# Patient Record
Sex: FEMALE | Race: WHITE | Employment: OTHER | URBAN - METROPOLITAN AREA
[De-identification: names, ages, dates, MRNs, and addresses within clinical notes are randomized per-mention and may not be internally consistent; named-entity substitution may affect disease eponyms.]

---

## 2017-02-10 ENCOUNTER — OFFICE VISIT (OUTPATIENT)
Dept: FAMILY MEDICINE CLINIC | Facility: CLINIC | Age: 67
End: 2017-02-10

## 2017-02-10 VITALS
SYSTOLIC BLOOD PRESSURE: 110 MMHG | BODY MASS INDEX: 23.37 KG/M2 | DIASTOLIC BLOOD PRESSURE: 68 MMHG | RESPIRATION RATE: 12 BRPM | WEIGHT: 142 LBS | HEIGHT: 65.5 IN | HEART RATE: 84 BPM | TEMPERATURE: 99 F

## 2017-02-10 DIAGNOSIS — R51.9 HEADACHE, UNSPECIFIED HEADACHE TYPE: Primary | ICD-10-CM

## 2017-02-10 DIAGNOSIS — R10.84 GENERALIZED ABDOMINAL PAIN: ICD-10-CM

## 2017-02-10 PROBLEM — E78.49 FAMILIAL MULTIPLE LIPOPROTEIN-TYPE HYPERLIPIDEMIA: Status: ACTIVE | Noted: 2017-02-10

## 2017-02-10 PROBLEM — G43.909 MIGRAINE: Status: ACTIVE | Noted: 2017-02-10

## 2017-02-10 PROCEDURE — 99214 OFFICE O/P EST MOD 30 MIN: CPT | Performed by: FAMILY MEDICINE

## 2017-02-10 RX ORDER — RIZATRIPTAN BENZOATE 10 MG/1
10 TABLET ORAL
COMMUNITY
Start: 2007-01-09 | End: 2017-02-11

## 2017-02-10 RX ORDER — CITALOPRAM 20 MG/1
20 TABLET ORAL DAILY
COMMUNITY
Start: 2014-02-27 | End: 2017-05-10

## 2017-02-10 RX ORDER — ZOLPIDEM TARTRATE 5 MG/1
5 TABLET ORAL DAILY
COMMUNITY
Start: 2016-09-24 | End: 2017-05-10

## 2017-02-10 RX ORDER — MULTIVIT-MIN/IRON/FOLIC ACID/K 18-600-40
500 CAPSULE ORAL DAILY
COMMUNITY
Start: 2011-12-29

## 2017-02-10 RX ORDER — UBIDECARENONE 75 MG
100 CAPSULE ORAL DAILY
COMMUNITY
Start: 2016-04-28 | End: 2019-11-27

## 2017-02-10 RX ORDER — SOLIFENACIN SUCCINATE 5 MG/1
5 TABLET, FILM COATED ORAL DAILY
COMMUNITY
Start: 2015-04-08 | End: 2017-07-01

## 2017-02-10 NOTE — PROGRESS NOTES
[facility] [place]  PROGRESS NOTE  Chief Complaint:   Patient presents with:  Headache  Abdominal Pain      HPI:   This is a 73year old female coming in for continued left-sided headache for the past month.   Patient has had a history of migrain 5 mg by mouth daily. Disp:  Rfl:    Rizatriptan Benzoate 10 MG Oral Tab Take 10 mg by mouth.  Take 1 tab tid for migraine prn Disp:  Rfl:       Counseling given: Not Answered       REVIEW OF SYSTEMS:   CONSTITUTIONAL:  Denies unusual weight gain/loss  EENT: auscultation bilterally, no rales/rhonchi/wheezing. CHEST: No tenderness. ABDOMEN: Soft with mild upper abdominal tenderness. Bowel sounds are normal.  No masses. =      ASSESSMENT AND PLAN:   Kim Lucas was seen today for headache and abdominal pain.

## 2017-02-13 RX ORDER — RIZATRIPTAN BENZOATE 10 MG/1
TABLET ORAL
Qty: 12 TABLET | Refills: 12 | Status: SHIPPED | OUTPATIENT
Start: 2017-02-13 | End: 2017-03-15 | Stop reason: ALTCHOICE

## 2017-02-22 ENCOUNTER — OFFICE VISIT (OUTPATIENT)
Dept: FAMILY MEDICINE CLINIC | Facility: CLINIC | Age: 67
End: 2017-02-22

## 2017-02-22 VITALS
DIASTOLIC BLOOD PRESSURE: 80 MMHG | WEIGHT: 142.31 LBS | SYSTOLIC BLOOD PRESSURE: 122 MMHG | BODY MASS INDEX: 23 KG/M2 | TEMPERATURE: 99 F | HEART RATE: 80 BPM

## 2017-02-22 DIAGNOSIS — J02.9 PHARYNGITIS, UNSPECIFIED ETIOLOGY: Primary | ICD-10-CM

## 2017-02-22 LAB — CONTROL LINE PRESENT WITH A CLEAR BACKGROUND (YES/NO): YES YES/NO

## 2017-02-22 PROCEDURE — 87081 CULTURE SCREEN ONLY: CPT | Performed by: NURSE PRACTITIONER

## 2017-02-22 PROCEDURE — 99213 OFFICE O/P EST LOW 20 MIN: CPT | Performed by: NURSE PRACTITIONER

## 2017-02-22 PROCEDURE — 87880 STREP A ASSAY W/OPTIC: CPT | Performed by: NURSE PRACTITIONER

## 2017-02-22 RX ORDER — AZITHROMYCIN 250 MG/1
TABLET, FILM COATED ORAL
Qty: 6 TABLET | Refills: 0 | Status: SHIPPED | OUTPATIENT
Start: 2017-02-22 | End: 2017-03-15 | Stop reason: ALTCHOICE

## 2017-02-22 NOTE — PROGRESS NOTES
CHIEF COMPLAINT:   Patient presents with:  Sore Throat      HPI:   Princess Cox is a 73year old female presents to clinic with symptoms of sore throat. Patient has had symptoms for 6 days. Symptoms have been worse since onset.   Patient reports follow NEURO: denies complaints    EXAM:   /80 mmHg  Pulse 80  Temp(Src) 98.7 °F (37.1 °C) (Tympanic)  Wt 142 lb 4.8 oz  GENERAL: well developed, well nourished,in no apparent distress  SKIN: no rashes,no suspicious lesions  HEAD: atraumatic, normocephalic Sore throats happen for many reasons, such as colds, allergies, and infections caused by viruses or bacteria. In any case, your throat becomes red and sore.  Your goal for self-care is to reduce your discomfort while giving your throat a chance to heal. · A temperature over 101°F (38.3°C)  · White spots on the throat  · Great difficulty swallowing  · Trouble breathing  · A skin rash  · Recent exposure to someone else with strep bacteria  · Severe hoarseness and swollen glands in the neck or jaw   Date Las

## 2017-02-22 NOTE — PATIENT INSTRUCTIONS
Push fluids, rest.  Antibiotic as prescribed, take with food. Tylenol or ibuprofen over the counter for discomfort. Return if symptoms worsen or do not improve in 2-3 days.   Self-Care for Sore Throats  Sore throats happen for many reasons, such as colds, cold, wash your hands often to keep viruses or bacteria from spreading. · Don’t strain your vocal cords.   Call your healthcare provider  Contact your healthcare provider if you have:  · A temperature over 101°F (38.3°C)  · White spots on the throat  · Gre

## 2017-02-24 ENCOUNTER — TELEPHONE (OUTPATIENT)
Dept: FAMILY MEDICINE CLINIC | Facility: CLINIC | Age: 67
End: 2017-02-24

## 2017-03-08 ENCOUNTER — OFFICE VISIT (OUTPATIENT)
Dept: FAMILY MEDICINE CLINIC | Facility: CLINIC | Age: 67
End: 2017-03-08

## 2017-03-08 VITALS
RESPIRATION RATE: 16 BRPM | HEIGHT: 65 IN | WEIGHT: 145.25 LBS | SYSTOLIC BLOOD PRESSURE: 124 MMHG | HEART RATE: 80 BPM | TEMPERATURE: 98 F | DIASTOLIC BLOOD PRESSURE: 70 MMHG | BODY MASS INDEX: 24.2 KG/M2

## 2017-03-08 DIAGNOSIS — N39.0 URINARY TRACT INFECTION WITHOUT HEMATURIA, SITE UNSPECIFIED: Primary | ICD-10-CM

## 2017-03-08 LAB
APPEARANCE: YELLOW
BILIRUBIN: NEGATIVE
GLUCOSE (URINE DIPSTICK): NEGATIVE MG/DL
KETONES (URINE DIPSTICK): NEGATIVE MG/DL
MULTISTIX LOT#: NORMAL NUMERIC
NITRITE, URINE: NEGATIVE
PH, URINE: 6 (ref 4.5–8)
PROTEIN (URINE DIPSTICK): NEGATIVE MG/DL
SPECIFIC GRAVITY: 1.01 (ref 1–1.03)
URINE-COLOR: CLEAR
UROBILINOGEN,SEMI-QN: 0.2 MG/DL (ref 0–1.9)

## 2017-03-08 PROCEDURE — 81003 URINALYSIS AUTO W/O SCOPE: CPT | Performed by: FAMILY MEDICINE

## 2017-03-08 PROCEDURE — 99213 OFFICE O/P EST LOW 20 MIN: CPT | Performed by: FAMILY MEDICINE

## 2017-03-08 RX ORDER — CIPROFLOXACIN 500 MG/1
500 TABLET, FILM COATED ORAL 2 TIMES DAILY
Qty: 20 TABLET | Refills: 0 | Status: SHIPPED | OUTPATIENT
Start: 2017-03-08 | End: 2017-05-10

## 2017-03-08 RX ORDER — PHENAZOPYRIDINE HYDROCHLORIDE 200 MG/1
200 TABLET, FILM COATED ORAL 3 TIMES DAILY PRN
COMMUNITY
End: 2017-03-15

## 2017-03-08 RX ORDER — SULFAMETHOXAZOLE AND TRIMETHOPRIM 800; 160 MG/1; MG/1
1 TABLET ORAL 2 TIMES DAILY
COMMUNITY
End: 2017-03-15

## 2017-03-09 NOTE — PROGRESS NOTES
[address]  PROGRESS NOTE  Chief Complaint:   Patient presents with: Follow - Up: post ER      HPI:   This is a 73year old female coming in for UTI.   Patient was seen in the emergency room 4 days ago for symptoms of UTI with burning an Phenazopyridine HCl 200 MG Oral Tab Take 200 mg by mouth 3 (three) times daily as needed for Pain. Disp:  Rfl:    Ciprofloxacin HCl (CIPRO) 500 MG Oral Tab Take 1 tablet (500 mg total) by mouth 2 (two) times daily.  Disp: 20 tablet Rfl: 0   RIZATRIPTAN BE without hematuria, site unspecified  -     Urine Dip, auto without Micro    Other orders  -     Ciprofloxacin HCl (CIPRO) 500 MG Oral Tab; Take 1 tablet (500 mg total) by mouth 2 (two) times daily. PLAN: UTI.   Urinalysis is still mildly abnormal.  D

## 2017-03-15 ENCOUNTER — OFFICE VISIT (OUTPATIENT)
Dept: NEUROLOGY | Facility: CLINIC | Age: 67
End: 2017-03-15

## 2017-03-15 VITALS
BODY MASS INDEX: 24 KG/M2 | DIASTOLIC BLOOD PRESSURE: 62 MMHG | HEART RATE: 70 BPM | WEIGHT: 144 LBS | RESPIRATION RATE: 16 BRPM | SYSTOLIC BLOOD PRESSURE: 112 MMHG

## 2017-03-15 DIAGNOSIS — G43.009 MIGRAINE WITHOUT AURA AND WITHOUT STATUS MIGRAINOSUS, NOT INTRACTABLE: ICD-10-CM

## 2017-03-15 DIAGNOSIS — D32.0 BENIGN NEOPLASM OF CEREBRAL MENINGES (HCC): Primary | ICD-10-CM

## 2017-03-15 PROCEDURE — 99205 OFFICE O/P NEW HI 60 MIN: CPT | Performed by: OTHER

## 2017-03-15 RX ORDER — FROVATRIPTAN SUCCINATE 2.5 MG/1
2.5 TABLET, FILM COATED ORAL AS NEEDED
Qty: 12 TABLET | Refills: 0 | Status: SHIPPED | OUTPATIENT
Start: 2017-03-15 | End: 2017-04-14

## 2017-03-15 NOTE — PROGRESS NOTES
Neurology H&P    Olgajaxon Cohen Patient Status:  No patient class for patient encounter    1950 MRN [id number]   Location [address] [place] [address], [address] [place] [address] Drive, [address] [facility] Attending No att. providers found   [facility] Day #  PCP Tarah Chua MD today, then one daily. Disp: 6 tablet Rfl: 0   RIZATRIPTAN BENZOATE 10 MG Oral Tab TAKE ONE TABLET BY MOUTH TWICE DAILY AS NEEDED FOR MIGRAINE.  Disp: 12 tablet Rfl: 12   Calcium Carb-Cholecalciferol (CALCIUM 500 +D) 500-400 MG-UNIT Oral Tab Take 500 mg by Regular rate and rhythm, no murmur  Pulm: CTAB  GI: non-tender, normal bowel sounds  Skin: normal, dry  Extremities: No clubbing or cyanosis      Neurologic:   MENTAL STATUS: alert, ox3, normal attention, language and fund of knowledge.       CRANIAL NERVES headaches  - We discussed possability of medrol dose pack or dexamethasone and Toradol in the clinic during her very severe headaches  - Exercise 3 times per week at least 30 minutes  - Caffeine only in moderation  - Encouraged proper hydration 6-8 glasses

## 2017-03-15 NOTE — PATIENT INSTRUCTIONS
Refill policies:    • Allow 2 business days for refills; controlled substances may take longer.   • Contact your pharmacy at least 5 days prior to running out of medication and have them send an electronic request or submit request through the “request re your physician has recommended that you have a procedure or additional testing performed. [facility]) will contact your insurance carrier to obtain pre-certification or prior authorization.     Unfortunately, YANETH has seen an increas

## 2017-03-16 ENCOUNTER — TELEPHONE (OUTPATIENT)
Dept: NEUROLOGY | Facility: CLINIC | Age: 67
End: 2017-03-16

## 2017-03-16 RX ORDER — CITALOPRAM 20 MG/1
20 TABLET ORAL DAILY
COMMUNITY
End: 2017-03-16

## 2017-03-16 RX ORDER — CITALOPRAM 20 MG/1
20 TABLET ORAL DAILY
Qty: 90 TABLET | Refills: 3 | Status: SHIPPED | OUTPATIENT
Start: 2017-03-16 | End: 2017-06-20

## 2017-03-16 RX ORDER — CITALOPRAM 20 MG/1
TABLET ORAL
Qty: 45 TABLET | Refills: 4 | OUTPATIENT
Start: 2017-03-16

## 2017-03-16 NOTE — TELEPHONE ENCOUNTER
According to our records patient is taking only 20 mg of citalopram.  Pharmacy request states patient is taking 1-1/2 tablets instead of 1 tablet a day. Please call patient and verify the amount that she is taking.

## 2017-03-16 NOTE — TELEPHONE ENCOUNTER
Started pA [employer] AIM online for MRI Brain cpt code 89351  Approved PA  Order# [id number]  Valid from 03/16/17 to 04/14/17    Pt not scheduled for test at this time.  Contacted pt and LMOM advising of response to contact central scheduling to make appt

## 2017-03-27 ENCOUNTER — TELEPHONE (OUTPATIENT)
Dept: NEUROLOGY | Facility: CLINIC | Age: 67
End: 2017-03-27

## 2017-03-27 RX ORDER — LORAZEPAM 1 MG/1
1 TABLET ORAL ONCE AS NEEDED
Qty: 1 TABLET | Refills: 0 | Status: SHIPPED | OUTPATIENT
Start: 2017-03-27 | End: 2017-03-27

## 2017-03-27 NOTE — TELEPHONE ENCOUNTER
Rx faxed to pharmacy, confirmation received. Informed patient and to have a  to and from the procedure. Verbalized understanding.

## 2017-03-28 ENCOUNTER — HOSPITAL ENCOUNTER (OUTPATIENT)
Dept: MRI IMAGING | Facility: HOSPITAL | Age: 67
Discharge: HOME | End: 2017-03-28
Attending: Other
Payer: COMMERCIAL

## 2017-03-28 DIAGNOSIS — D32.0 BENIGN NEOPLASM OF CEREBRAL MENINGES (HCC): ICD-10-CM

## 2017-03-28 PROCEDURE — 70553 MRI BRAIN STEM W/O & W/DYE: CPT

## 2017-03-28 PROCEDURE — A9575 INJ GADOTERATE MEGLUMI 0.1ML: HCPCS | Performed by: OTHER

## 2017-03-29 ENCOUNTER — TELEPHONE (OUTPATIENT)
Dept: NEUROLOGY | Facility: CLINIC | Age: 67
End: 2017-03-29

## 2017-03-29 NOTE — TELEPHONE ENCOUNTER
I called Ms Cristian Hill with the results of her MRI brain. No evidence of the meningioma that she thought she had form past imaging. Unremarkable MRI brain. She states that she feels well and has no complaints.

## 2017-05-10 ENCOUNTER — OFFICE VISIT (OUTPATIENT)
Dept: FAMILY MEDICINE CLINIC | Facility: CLINIC | Age: 67
End: 2017-05-10

## 2017-05-10 VITALS
RESPIRATION RATE: 16 BRPM | HEART RATE: 66 BPM | BODY MASS INDEX: 23.73 KG/M2 | TEMPERATURE: 98 F | HEIGHT: 65.5 IN | DIASTOLIC BLOOD PRESSURE: 66 MMHG | WEIGHT: 144.19 LBS | SYSTOLIC BLOOD PRESSURE: 136 MMHG

## 2017-05-10 DIAGNOSIS — G47.00 INSOMNIA, UNSPECIFIED TYPE: ICD-10-CM

## 2017-05-10 DIAGNOSIS — J32.9 SINUSITIS, UNSPECIFIED CHRONICITY, UNSPECIFIED LOCATION: Primary | ICD-10-CM

## 2017-05-10 PROCEDURE — 99214 OFFICE O/P EST MOD 30 MIN: CPT | Performed by: FAMILY MEDICINE

## 2017-05-10 RX ORDER — CEPHALEXIN 250 MG/1
250 CAPSULE ORAL 4 TIMES DAILY
COMMUNITY
End: 2017-05-13

## 2017-05-10 RX ORDER — AZITHROMYCIN 250 MG/1
TABLET, FILM COATED ORAL
Qty: 6 TABLET | Refills: 0 | Status: SHIPPED | OUTPATIENT
Start: 2017-05-10 | End: 2017-05-23 | Stop reason: ALTCHOICE

## 2017-05-10 RX ORDER — ZOLPIDEM TARTRATE 5 MG/1
5 TABLET ORAL DAILY
Qty: 30 TABLET | Refills: 0 | Status: SHIPPED
Start: 2017-05-10 | End: 2018-08-15

## 2017-05-10 NOTE — PROGRESS NOTES
[address]  PROGRESS NOTE  Chief Complaint:   Patient presents with:  Cough: sinus, had a sore throat went to immediate care and got antibiotics. Patient says the antibiotics have not helped, but her throat doesnt hurt anymore.       HPI: Disp:  Rfl:    azithromycin (ZITHROMAX Z-SAVANAH) 250 MG Oral Tab Take two tablets by mouth today, then one tablet daily. Disp: 6 tablet Rfl: 0   Zolpidem Tartrate (AMBIEN) 5 MG Oral Tab Take 1 tablet (5 mg total) by mouth daily.  Disp: 30 tablet Rfl: 0   Cital awake and oriented, well developed, well nourished, no apparent distress. HEENT:  Eyes: EOMI, PERRLA, no scleral icterus, conjunctivae clear bilaterally, no eye discharge Ears: External normal, TMs normal  Nose:  congested.   Throat:  No tonsillar erythema

## 2017-05-13 ENCOUNTER — OFFICE VISIT (OUTPATIENT)
Dept: FAMILY MEDICINE CLINIC | Facility: CLINIC | Age: 67
End: 2017-05-13

## 2017-05-13 VITALS
WEIGHT: 143.63 LBS | RESPIRATION RATE: 16 BRPM | HEIGHT: 66 IN | BODY MASS INDEX: 23.08 KG/M2 | DIASTOLIC BLOOD PRESSURE: 76 MMHG | TEMPERATURE: 98 F | HEART RATE: 84 BPM | SYSTOLIC BLOOD PRESSURE: 112 MMHG

## 2017-05-13 DIAGNOSIS — N76.0 ACUTE VAGINITIS: Primary | ICD-10-CM

## 2017-05-13 PROCEDURE — 87660 TRICHOMONAS VAGIN DIR PROBE: CPT | Performed by: NURSE PRACTITIONER

## 2017-05-13 PROCEDURE — 87480 CANDIDA DNA DIR PROBE: CPT | Performed by: NURSE PRACTITIONER

## 2017-05-13 PROCEDURE — 99214 OFFICE O/P EST MOD 30 MIN: CPT | Performed by: NURSE PRACTITIONER

## 2017-05-13 PROCEDURE — 87510 GARDNER VAG DNA DIR PROBE: CPT | Performed by: NURSE PRACTITIONER

## 2017-05-13 RX ORDER — NYSTATIN AND TRIAMCINOLONE ACETONIDE 100000; 1 [USP'U]/G; MG/G
OINTMENT TOPICAL 2 TIMES DAILY
COMMUNITY
Start: 2017-05-10 | End: 2017-06-20 | Stop reason: ALTCHOICE

## 2017-05-13 RX ORDER — FLUCONAZOLE 150 MG/1
1 TABLET ORAL EVERY OTHER DAY
COMMUNITY
Start: 2017-05-10 | End: 2017-06-20 | Stop reason: ALTCHOICE

## 2017-05-13 NOTE — PROGRESS NOTES
Patient ID:   Johan Rodriguez  is a 78year old female    Allergies    Penicillins               Medications     fluconazole 150 MG Oral Tab Take 1 tablet by mouth every other day.  Disp:  Rfl:    nystatin-triamcinolone 863153-2.1 UNIT/GM-% External Oint dyspareunia, fever, chills, lesions, rash, abnormal bleeding, etc.    Denies UTI symptoms; urinary frequency, urgency, flank pain, suprapubic pain, fever, nausea, vomiting, hematuria, malodorous urine, cloudy urine.     Review of Systems   GENERAL: feels go treatment is completed. Recommend applying a and D ointment externally, follow-up if you notice any sores or lesions to the area. Vaginal cultures obtained for gonorrhea, chlamydia, also bacterial vaginosis, yeast, trichomonas.   I would recommend fo

## 2017-05-13 NOTE — PATIENT INSTRUCTIONS
Discussed the causes of bacterial vaginosis, including bubble baths, douche, intercourse and oral sex, new partner, scented soaps, thong underwear, or anything else that can disrupt the normal vaginal griselda such as an antibiotic.   Eat more yogurt with acti

## 2017-05-15 ENCOUNTER — TELEPHONE (OUTPATIENT)
Dept: FAMILY MEDICINE CLINIC | Facility: CLINIC | Age: 67
End: 2017-05-15

## 2017-05-15 NOTE — TELEPHONE ENCOUNTER
----- Message from MARIANO Jean sent at 5/15/2017  7:15 AM CDT -----  Please notify patient that gonorrhea and chlamydia is negative. Also she is negative for yeast, BV, and Trich. How is she feeling using the A & D?   If not better- would recommen

## 2017-05-23 ENCOUNTER — OFFICE VISIT (OUTPATIENT)
Dept: FAMILY MEDICINE CLINIC | Facility: CLINIC | Age: 67
End: 2017-05-23

## 2017-05-23 VITALS
WEIGHT: 141.38 LBS | SYSTOLIC BLOOD PRESSURE: 112 MMHG | HEART RATE: 82 BPM | TEMPERATURE: 99 F | BODY MASS INDEX: 23 KG/M2 | OXYGEN SATURATION: 99 % | DIASTOLIC BLOOD PRESSURE: 82 MMHG

## 2017-05-23 DIAGNOSIS — J32.9 SINUSITIS, UNSPECIFIED CHRONICITY, UNSPECIFIED LOCATION: Primary | ICD-10-CM

## 2017-05-23 DIAGNOSIS — J02.9 SORE THROAT: ICD-10-CM

## 2017-05-23 PROCEDURE — 87081 CULTURE SCREEN ONLY: CPT | Performed by: NURSE PRACTITIONER

## 2017-05-23 PROCEDURE — 87880 STREP A ASSAY W/OPTIC: CPT | Performed by: NURSE PRACTITIONER

## 2017-05-23 PROCEDURE — 99213 OFFICE O/P EST LOW 20 MIN: CPT | Performed by: NURSE PRACTITIONER

## 2017-05-23 RX ORDER — FLUCONAZOLE 100 MG/1
100 TABLET ORAL DAILY
Qty: 3 TABLET | Refills: 0 | Status: SHIPPED | OUTPATIENT
Start: 2017-05-23 | End: 2017-05-26

## 2017-05-23 RX ORDER — AZITHROMYCIN 500 MG/1
500 TABLET, FILM COATED ORAL DAILY
Qty: 7 TABLET | Refills: 0 | Status: SHIPPED | OUTPATIENT
Start: 2017-05-23 | End: 2017-05-30

## 2017-05-23 NOTE — PROGRESS NOTES
CHIEF COMPLAINT:   Patient presents with:  Sore Throat  Cough  Nasal Congestion      HPI:   Rich Leavitt is a 78year old female who presents to clinic today with complaints of fever yesterday, sore throat, sinus congestion -will be leaving for shortness of breath, or wheezing. CARDIOVASCULAR: No chest pain, palpitations  GI: No N/V/C/D.   NEURO: denies complaints    EXAM:   /82 mmHg  Pulse 82  Temp(Src) 98.6 °F (37 °C) (Tympanic)  Wt 141 lb 6.4 oz  SpO2 99%  GENERAL: well developed, well n MG Oral Tab 7 tablet 0      Sig: Take 1 tablet (500 mg total) by mouth daily. fluconazole 100 MG Oral Tab 3 tablet 0      Sig: Take 1 tablet (100 mg total) by mouth daily.               MARIANO Pandey, FNP-BC  5/23/2017   9:52 AM

## 2017-05-23 NOTE — PATIENT INSTRUCTIONS
Rest, increase fluids, salt water gargles ,bharathi pot (use distilled water) or saline nasal spray, Flonase 2 sprays each nostril once a day, Advil cold and sinus (behind the counter), Al avert, Tylenol/Ibuprofen, follow up if symptoms persist or increase.

## 2017-05-25 ENCOUNTER — TELEPHONE (OUTPATIENT)
Dept: FAMILY MEDICINE CLINIC | Facility: CLINIC | Age: 67
End: 2017-05-25

## 2017-05-25 NOTE — TELEPHONE ENCOUNTER
----- Message from MARIANO Call sent at 5/25/2017 12:06 PM CDT -----  Negative strep culture- please notify patient

## 2017-06-20 ENCOUNTER — OFFICE VISIT (OUTPATIENT)
Dept: FAMILY MEDICINE CLINIC | Facility: CLINIC | Age: 67
End: 2017-06-20

## 2017-06-20 VITALS
BODY MASS INDEX: 23 KG/M2 | TEMPERATURE: 99 F | HEART RATE: 76 BPM | SYSTOLIC BLOOD PRESSURE: 112 MMHG | DIASTOLIC BLOOD PRESSURE: 80 MMHG | WEIGHT: 145.19 LBS

## 2017-06-20 DIAGNOSIS — N76.0 ACUTE VAGINITIS: Primary | ICD-10-CM

## 2017-06-20 DIAGNOSIS — Z12.11 ENCOUNTER FOR SCREENING FECAL OCCULT BLOOD TESTING: ICD-10-CM

## 2017-06-20 PROCEDURE — 87624 HPV HI-RISK TYP POOLED RSLT: CPT | Performed by: NURSE PRACTITIONER

## 2017-06-20 PROCEDURE — 87491 CHLMYD TRACH DNA AMP PROBE: CPT | Performed by: NURSE PRACTITIONER

## 2017-06-20 PROCEDURE — 88175 CYTOPATH C/V AUTO FLUID REDO: CPT | Performed by: NURSE PRACTITIONER

## 2017-06-20 PROCEDURE — 87591 N.GONORRHOEAE DNA AMP PROB: CPT | Performed by: NURSE PRACTITIONER

## 2017-06-20 PROCEDURE — 87480 CANDIDA DNA DIR PROBE: CPT | Performed by: NURSE PRACTITIONER

## 2017-06-20 PROCEDURE — 99214 OFFICE O/P EST MOD 30 MIN: CPT | Performed by: NURSE PRACTITIONER

## 2017-06-20 PROCEDURE — 87660 TRICHOMONAS VAGIN DIR PROBE: CPT | Performed by: NURSE PRACTITIONER

## 2017-06-20 PROCEDURE — 82272 OCCULT BLD FECES 1-3 TESTS: CPT | Performed by: NURSE PRACTITIONER

## 2017-06-20 PROCEDURE — 87510 GARDNER VAG DNA DIR PROBE: CPT | Performed by: NURSE PRACTITIONER

## 2017-06-20 PROCEDURE — 87070 CULTURE OTHR SPECIMN AEROBIC: CPT | Performed by: NURSE PRACTITIONER

## 2017-06-20 RX ORDER — CITALOPRAM 20 MG/1
TABLET ORAL
Qty: 45 TABLET | Refills: 1 | Status: SHIPPED | OUTPATIENT
Start: 2017-06-20 | End: 2018-09-14

## 2017-06-20 NOTE — PATIENT INSTRUCTIONS
Pap test with HPV obtained today results will be back within a week. Gonorrhea chlamydia test obtained will be back in a few days. Vaginitis swab to check for yeast, bacterial vaginosis, trichomonas will be back tomorrow.     Aerobic culture of vagina

## 2017-06-20 NOTE — PROGRESS NOTES
Patient ID:   Sienna Woo  is a 78year old female    Allergies    Penicillins               Medications     CITALOPRAM HYDROBROMIDE 20 MG Oral Tab TAKE ONE AND ONE-HALF TABLET BY MOUTH DAILY Disp: 45 tablet Rfl: 1   Ospemifene (OSPHENA) 60 MG Oral hemoccult. Inguinal area: negative for hernia   Genitourinary:        Vulva: Normal no lesions. Introitus and perineum: Normal.   Urethra: Normal .   Bladder: No evidence of cystocele .    Vagina: no evidence of rectocele or cystocele  Small amoun

## 2017-06-22 ENCOUNTER — TELEPHONE (OUTPATIENT)
Dept: FAMILY MEDICINE CLINIC | Facility: CLINIC | Age: 67
End: 2017-06-22

## 2017-06-22 NOTE — TELEPHONE ENCOUNTER
----- Message from MARIANO Wheat sent at 6/22/2017  7:06 AM CDT -----  Please notify patient that gonorrhea and chlamydia is negative, her bacterial culture is negative, and her HPV is negative. Awaiting pap . Thank you.

## 2017-06-23 ENCOUNTER — TELEPHONE (OUTPATIENT)
Dept: FAMILY MEDICINE CLINIC | Facility: CLINIC | Age: 67
End: 2017-06-23

## 2017-06-28 ENCOUNTER — TELEPHONE (OUTPATIENT)
Dept: FAMILY MEDICINE CLINIC | Facility: CLINIC | Age: 67
End: 2017-06-28

## 2017-06-28 NOTE — TELEPHONE ENCOUNTER
Pt is going to [facility] for 3 weeks. Pt would like to know what imms or medication does she need. Please advise.

## 2017-06-29 NOTE — TELEPHONE ENCOUNTER
Would recommend an appointment to go over immunizations and malaria prevention. Patient also could see [facility] travel section or [facility] travel for advice.

## 2017-06-29 NOTE — TELEPHONE ENCOUNTER
Pt was looking for typhoid which I informed pt that we don't have in the office. Pt will call the health department for this injection. Pt will call back on Saturday for an appt to discuss further med for her [facility] trip. We have no appt this week.

## 2017-07-01 ENCOUNTER — OFFICE VISIT (OUTPATIENT)
Dept: FAMILY MEDICINE CLINIC | Facility: CLINIC | Age: 67
End: 2017-07-01

## 2017-07-01 VITALS
TEMPERATURE: 99 F | SYSTOLIC BLOOD PRESSURE: 120 MMHG | DIASTOLIC BLOOD PRESSURE: 74 MMHG | HEART RATE: 76 BPM | HEIGHT: 66 IN | BODY MASS INDEX: 23.3 KG/M2 | WEIGHT: 145 LBS | RESPIRATION RATE: 16 BRPM

## 2017-07-01 DIAGNOSIS — Z71.84 TRAVEL ADVICE ENCOUNTER: Primary | ICD-10-CM

## 2017-07-01 PROCEDURE — 99212 OFFICE O/P EST SF 10 MIN: CPT | Performed by: FAMILY MEDICINE

## 2017-07-01 RX ORDER — AZITHROMYCIN 500 MG/1
500 TABLET, FILM COATED ORAL DAILY
Qty: 5 TABLET | Refills: 0 | Status: SHIPPED | OUTPATIENT
Start: 2017-07-01 | End: 2017-08-21

## 2017-07-01 RX ORDER — SOLIFENACIN SUCCINATE 5 MG/1
5 TABLET, FILM COATED ORAL DAILY
Qty: 90 TABLET | Refills: 1 | Status: SHIPPED | OUTPATIENT
Start: 2017-07-01 | End: 2017-12-20

## 2017-07-01 RX ORDER — MEFLOQUINE HYDROCHLORIDE 250 MG/1
TABLET ORAL
Qty: 8 TABLET | Refills: 0 | Status: SHIPPED | OUTPATIENT
Start: 2017-07-01 | End: 2017-08-21

## 2017-07-01 NOTE — PROGRESS NOTES
This patient is here for travel advice encounter. Patient will be traveling to [facility] and will be in small villages but will also be and about cruising down the [facility].   She has been to the health department and has had hepatitis a and B vaccine conchita

## 2017-07-01 NOTE — PATIENT INSTRUCTIONS
Malaria medication given for prophylaxis. Zithromax given for traveler's diarrhea. Return if any problems.

## 2017-07-05 ENCOUNTER — OFFICE VISIT (OUTPATIENT)
Dept: FAMILY MEDICINE CLINIC | Facility: CLINIC | Age: 67
End: 2017-07-05

## 2017-07-05 VITALS
WEIGHT: 149.81 LBS | TEMPERATURE: 99 F | BODY MASS INDEX: 24 KG/M2 | HEART RATE: 72 BPM | DIASTOLIC BLOOD PRESSURE: 70 MMHG | SYSTOLIC BLOOD PRESSURE: 120 MMHG

## 2017-07-05 DIAGNOSIS — H65.01 RIGHT ACUTE SEROUS OTITIS MEDIA, RECURRENCE NOT SPECIFIED: Primary | ICD-10-CM

## 2017-07-05 PROCEDURE — 99213 OFFICE O/P EST LOW 20 MIN: CPT | Performed by: NURSE PRACTITIONER

## 2017-07-05 RX ORDER — CEPHALEXIN 500 MG/1
500 CAPSULE ORAL 3 TIMES DAILY
Qty: 30 CAPSULE | Refills: 0 | Status: SHIPPED | OUTPATIENT
Start: 2017-07-05 | End: 2017-07-15

## 2017-07-05 RX ORDER — FLUCONAZOLE 100 MG/1
100 TABLET ORAL DAILY
Qty: 3 TABLET | Refills: 0 | Status: SHIPPED | OUTPATIENT
Start: 2017-07-05 | End: 2017-07-08

## 2017-07-05 NOTE — PROGRESS NOTES
CHIEF COMPLAINT:   Patient presents with:  Ear Pain: Right ear      HPI:   Heri Toscano is a 78year old female who presents to clinic today with complaints of pain to right ear   Was in [place] last week- had wear in it- pain for 4-5 days- itchy Smokeless tobacco: Never Used                      Alcohol use: Yes           0.0 oz/week     Comment: 1/ month       REVIEW OF SYSTEMS:   GENERAL: See HPI  SKIN: no unusual skin lesions or rashes  HEENT: See HPI  THYROID: normal pain increases. -Use Diflucan prescription only if you get yeast infection symptoms such as itching or discharge. Discussed with patient to try the Sudafed and the Flonase, only use the Keflex if her symptoms are worse.   Prescriptions given for both Kefl

## 2017-07-05 NOTE — PATIENT INSTRUCTIONS
Rest, increase fluids, salt water gargles ,neti pot (use distilled water) or saline nasal spray,sudafed (behind the counter), Flonase 2 sprays each nostril once a day,  Tylenol/Ibuprofen, follow up if symptoms persist or increase.       Fill prescription fo

## 2017-08-21 ENCOUNTER — OFFICE VISIT (OUTPATIENT)
Dept: FAMILY MEDICINE CLINIC | Facility: CLINIC | Age: 67
End: 2017-08-21

## 2017-08-21 ENCOUNTER — LAB ENCOUNTER (OUTPATIENT)
Dept: LAB | Age: 67
End: 2017-08-21
Attending: FAMILY MEDICINE
Payer: COMMERCIAL

## 2017-08-21 VITALS
DIASTOLIC BLOOD PRESSURE: 70 MMHG | HEIGHT: 64.5 IN | TEMPERATURE: 99 F | SYSTOLIC BLOOD PRESSURE: 110 MMHG | HEART RATE: 76 BPM | WEIGHT: 152.13 LBS | BODY MASS INDEX: 25.65 KG/M2 | RESPIRATION RATE: 16 BRPM

## 2017-08-21 DIAGNOSIS — R10.84 GENERALIZED ABDOMINAL PAIN: Primary | ICD-10-CM

## 2017-08-21 PROCEDURE — 99214 OFFICE O/P EST MOD 30 MIN: CPT | Performed by: FAMILY MEDICINE

## 2017-08-21 NOTE — PROGRESS NOTES
[facility] [place]  PROGRESS NOTE  Chief Complaint:   Patient presents with:  Nausea  Abdominal Pain      HPI:   This is a 78year old female coming in for abdominal pain and not feeling well.   This patient returned from a two-week trip in [facility] Screening Pap, Endocervix                                                  Interpretation/Result Negative for intraepithelial lesion or malignancy    Specimen Adequacy Satisfactory for evaluation.  Endocervical or metaplastic cells present    General [place] TONSILLECTOMY  Social History:  Smoking status: Never Smoker                                                              Smokeless tobacco: Never Used                      Alcohol use: Yes           0.0 oz/week     Comment: 1/ month    Family History:   Fa tingling in the extremities,change in bowel or bladder control. HEMATOLOGIC:  Denies anemia, bleeding or bruising. LYMPHATICS:  Denies enlarged nodes   PSYCHIATRIC:  Denies depression or anxiety.       EXAM:   /70 (BP Location: Right arm, Patient Po 04/12/2016    Patient/Caregiver Education: Patient/Caregiver Education: There are no barriers to learning. Medical education done. Outcome: Patient verbalizes understanding.  Patient is notified to call with any questions, complications, allergies, or wor

## 2017-08-22 ENCOUNTER — LABORATORY ENCOUNTER (OUTPATIENT)
Dept: LAB | Age: 67
End: 2017-08-22
Attending: FAMILY MEDICINE
Payer: COMMERCIAL

## 2017-08-22 DIAGNOSIS — R10.84 GENERALIZED ABDOMINAL PAIN: ICD-10-CM

## 2017-08-22 LAB
ALBUMIN SERPL-MCNC: 2.8 G/DL (ref 3.5–4.8)
ALP LIVER SERPL-CCNC: 64 U/L (ref 55–142)
ALT SERPL-CCNC: 15 U/L (ref 14–54)
AST SERPL-CCNC: 22 U/L (ref 15–41)
BASOPHILS # BLD AUTO: 0.02 X10(3) UL (ref 0–0.1)
BASOPHILS NFR BLD AUTO: 0.4 %
BILIRUB SERPL-MCNC: 0.5 MG/DL (ref 0.1–2)
BUN BLD-MCNC: 14 MG/DL (ref 8–20)
CALCIUM BLD-MCNC: 9 MG/DL (ref 8.3–10.3)
CHLORIDE: 107 MMOL/L (ref 101–111)
CO2: 21 MMOL/L (ref 22–32)
CREAT BLD-MCNC: 0.91 MG/DL (ref 0.55–1.02)
EOSINOPHIL # BLD AUTO: 0.06 X10(3) UL (ref 0–0.3)
EOSINOPHIL NFR BLD AUTO: 1.3 %
ERYTHROCYTE [DISTWIDTH] IN BLOOD BY AUTOMATED COUNT: 12.8 % (ref 11.5–16)
GLUCOSE BLD-MCNC: 86 MG/DL (ref 70–99)
HCT VFR BLD AUTO: 36.9 % (ref 34–50)
HGB BLD-MCNC: 11.6 G/DL (ref 12–16)
IMMATURE GRANULOCYTE COUNT: 0.01 X10(3) UL (ref 0–1)
IMMATURE GRANULOCYTE RATIO %: 0.2 %
LYMPHOCYTES # BLD AUTO: 1.86 X10(3) UL (ref 0.9–4)
LYMPHOCYTES NFR BLD AUTO: 41.1 %
M PROTEIN MFR SERPL ELPH: 7 G/DL (ref 6.1–8.3)
MCH RBC QN AUTO: 30.1 PG (ref 27–33.2)
MCHC RBC AUTO-ENTMCNC: 31.4 G/DL (ref 31–37)
MCV RBC AUTO: 95.8 FL (ref 81–100)
MONOCYTES # BLD AUTO: 0.26 X10(3) UL (ref 0.1–0.6)
MONOCYTES NFR BLD AUTO: 5.7 %
NEUTROPHIL ABS PRELIM: 2.32 X10 (3) UL (ref 1.3–6.7)
NEUTROPHILS # BLD AUTO: 2.32 X10(3) UL (ref 1.3–6.7)
NEUTROPHILS NFR BLD AUTO: 51.3 %
PLATELET # BLD AUTO: 134 10(3)UL (ref 150–450)
POTASSIUM SERPL-SCNC: 4.9 MMOL/L (ref 3.6–5.1)
RBC # BLD AUTO: 3.85 X10(6)UL (ref 3.8–5.1)
RED CELL DISTRIBUTION WIDTH-SD: 45.6 FL (ref 35.1–46.3)
SODIUM SERPL-SCNC: 135 MMOL/L (ref 136–144)
WBC # BLD AUTO: 4.5 X10(3) UL (ref 4–13)

## 2017-08-22 PROCEDURE — 85025 COMPLETE CBC W/AUTO DIFF WBC: CPT

## 2017-08-22 PROCEDURE — 87272 CRYPTOSPORIDIUM AG IF: CPT

## 2017-08-22 PROCEDURE — 36415 COLL VENOUS BLD VENIPUNCTURE: CPT

## 2017-08-22 PROCEDURE — 87046 STOOL CULTR AEROBIC BACT EA: CPT

## 2017-08-22 PROCEDURE — 87329 GIARDIA AG IA: CPT

## 2017-08-22 PROCEDURE — 87177 OVA AND PARASITES SMEARS: CPT

## 2017-08-22 PROCEDURE — 87045 FECES CULTURE AEROBIC BACT: CPT

## 2017-08-22 PROCEDURE — 87209 SMEAR COMPLEX STAIN: CPT

## 2017-08-22 PROCEDURE — 87427 SHIGA-LIKE TOXIN AG IA: CPT

## 2017-08-22 PROCEDURE — 80053 COMPREHEN METABOLIC PANEL: CPT

## 2017-08-23 ENCOUNTER — OFFICE VISIT (OUTPATIENT)
Dept: FAMILY MEDICINE CLINIC | Facility: CLINIC | Age: 67
End: 2017-08-23

## 2017-08-23 VITALS
DIASTOLIC BLOOD PRESSURE: 76 MMHG | WEIGHT: 148.63 LBS | HEIGHT: 65.5 IN | BODY MASS INDEX: 24.47 KG/M2 | HEART RATE: 60 BPM | TEMPERATURE: 98 F | RESPIRATION RATE: 12 BRPM | SYSTOLIC BLOOD PRESSURE: 126 MMHG

## 2017-08-23 DIAGNOSIS — B37.3 YEAST VAGINITIS: Primary | ICD-10-CM

## 2017-08-23 LAB
CRYPTOSP AG STL QL IA: NEGATIVE
G LAMBLIA AG STL QL IA: NEGATIVE

## 2017-08-23 PROCEDURE — 87660 TRICHOMONAS VAGIN DIR PROBE: CPT | Performed by: NURSE PRACTITIONER

## 2017-08-23 PROCEDURE — 87480 CANDIDA DNA DIR PROBE: CPT | Performed by: NURSE PRACTITIONER

## 2017-08-23 PROCEDURE — 99214 OFFICE O/P EST MOD 30 MIN: CPT | Performed by: NURSE PRACTITIONER

## 2017-08-23 PROCEDURE — 87510 GARDNER VAG DNA DIR PROBE: CPT | Performed by: NURSE PRACTITIONER

## 2017-08-23 NOTE — PATIENT INSTRUCTIONS
The causes of yeast infections include; warm, dark, moist, places.   Including wearing a bathing suit for a long time, sweating, bubble baths, douche, intercourse and oral sex, scented soaps, thong underwear, or anything else that can disrupt the normal vag

## 2017-08-23 NOTE — PROGRESS NOTES
Patient ID:   Shavonne Rueda  is a 78year old female    Allergies    Penicillins               Medications     Solifenacin Succinate (VESICARE) 5 MG Oral Tab Take 1 tablet (5 mg total) by mouth daily.  Disp: 90 tablet Rfl: 1   CITALOPRAM HYDROBROMIDE 2 auscultation bilaterally   Abdominal: Soft, mild, diffusely tender, no guarding, no rebound, no masses, no hepatosplenomegaly, no CVA tenderness. Inguinal area: negative for hernia   Genitourinary:        Vulva: Normal no lesions.      Introitus and

## 2017-08-24 ENCOUNTER — TELEPHONE (OUTPATIENT)
Dept: FAMILY MEDICINE CLINIC | Facility: CLINIC | Age: 67
End: 2017-08-24

## 2017-08-24 RX ORDER — FLUCONAZOLE 100 MG/1
100 TABLET ORAL DAILY
Qty: 3 TABLET | Refills: 0 | Status: SHIPPED | OUTPATIENT
Start: 2017-08-24 | End: 2017-08-27

## 2017-08-24 NOTE — TELEPHONE ENCOUNTER
----- Message from MARIANO Berg sent at 8/24/2017  2:40 PM CDT -----  Please notify patient that her vaginal swab did come back showing a yeast infection, she is negative for bacterial vaginosis and trichomonas.   Prescription for Diflucan sent to [facility]

## 2017-08-24 NOTE — TELEPHONE ENCOUNTER
Patient is calling about labs Dr. Jenni Friedman ordered. Re routing to PCP. The orders that joann ordered are still in process.

## 2017-08-25 LAB
OVA AND PARASITE, TRICHROME STAIN: NEGATIVE
OVA AND PARASITE, WET MOUNT: NEGATIVE

## 2017-08-28 ENCOUNTER — LAB ENCOUNTER (OUTPATIENT)
Dept: LAB | Age: 67
End: 2017-08-28
Attending: FAMILY MEDICINE
Payer: COMMERCIAL

## 2017-08-28 ENCOUNTER — OFFICE VISIT (OUTPATIENT)
Dept: FAMILY MEDICINE CLINIC | Facility: CLINIC | Age: 67
End: 2017-08-28

## 2017-08-28 VITALS
SYSTOLIC BLOOD PRESSURE: 122 MMHG | TEMPERATURE: 99 F | HEART RATE: 58 BPM | BODY MASS INDEX: 25.95 KG/M2 | WEIGHT: 152 LBS | HEIGHT: 64.25 IN | RESPIRATION RATE: 16 BRPM | DIASTOLIC BLOOD PRESSURE: 70 MMHG

## 2017-08-28 DIAGNOSIS — D69.6 THROMBOCYTOPENIA (HCC): ICD-10-CM

## 2017-08-28 DIAGNOSIS — E87.1 HYPONATREMIA: ICD-10-CM

## 2017-08-28 DIAGNOSIS — R10.84 GENERALIZED ABDOMINAL PAIN: ICD-10-CM

## 2017-08-28 DIAGNOSIS — B34.9 VIRAL ILLNESS: ICD-10-CM

## 2017-08-28 DIAGNOSIS — D69.6 THROMBOCYTOPENIA (HCC): Primary | ICD-10-CM

## 2017-08-28 LAB
ALBUMIN SERPL-MCNC: 3.8 G/DL (ref 3.5–4.8)
ALP LIVER SERPL-CCNC: 71 U/L (ref 55–142)
ALT SERPL-CCNC: 21 U/L (ref 14–54)
AST SERPL-CCNC: 17 U/L (ref 15–41)
BASOPHILS # BLD AUTO: 0.02 X10(3) UL (ref 0–0.1)
BASOPHILS NFR BLD AUTO: 0.3 %
BILIRUB SERPL-MCNC: 0.4 MG/DL (ref 0.1–2)
BUN BLD-MCNC: 12 MG/DL (ref 8–20)
CALCIUM BLD-MCNC: 9.5 MG/DL (ref 8.3–10.3)
CHLORIDE: 103 MMOL/L (ref 101–111)
CO2: 28 MMOL/L (ref 22–32)
CREAT BLD-MCNC: 0.97 MG/DL (ref 0.55–1.02)
EOSINOPHIL # BLD AUTO: 0.08 X10(3) UL (ref 0–0.3)
EOSINOPHIL NFR BLD AUTO: 1.2 %
ERYTHROCYTE [DISTWIDTH] IN BLOOD BY AUTOMATED COUNT: 12.6 % (ref 11.5–16)
GLUCOSE BLD-MCNC: 106 MG/DL (ref 70–99)
HCT VFR BLD AUTO: 38.4 % (ref 34–50)
HGB BLD-MCNC: 12.2 G/DL (ref 12–16)
IMMATURE GRANULOCYTE COUNT: 0.01 X10(3) UL (ref 0–1)
IMMATURE GRANULOCYTE RATIO %: 0.2 %
LYMPHOCYTES # BLD AUTO: 2.22 X10(3) UL (ref 0.9–4)
LYMPHOCYTES NFR BLD AUTO: 33.4 %
M PROTEIN MFR SERPL ELPH: 7.7 G/DL (ref 6.1–8.3)
MCH RBC QN AUTO: 30.5 PG (ref 27–33.2)
MCHC RBC AUTO-ENTMCNC: 31.8 G/DL (ref 31–37)
MCV RBC AUTO: 96 FL (ref 81–100)
MONOCYTES # BLD AUTO: 0.31 X10(3) UL (ref 0.1–0.6)
MONOCYTES NFR BLD AUTO: 4.7 %
NEUTROPHIL ABS PRELIM: 4.01 X10 (3) UL (ref 1.3–6.7)
NEUTROPHILS # BLD AUTO: 4.01 X10(3) UL (ref 1.3–6.7)
NEUTROPHILS NFR BLD AUTO: 60.2 %
PLATELET # BLD AUTO: 307 10(3)UL (ref 150–450)
POTASSIUM SERPL-SCNC: 4 MMOL/L (ref 3.6–5.1)
RBC # BLD AUTO: 4 X10(6)UL (ref 3.8–5.1)
RED CELL DISTRIBUTION WIDTH-SD: 45 FL (ref 35.1–46.3)
SODIUM SERPL-SCNC: 137 MMOL/L (ref 136–144)
WBC # BLD AUTO: 6.7 X10(3) UL (ref 4–13)

## 2017-08-28 PROCEDURE — 99213 OFFICE O/P EST LOW 20 MIN: CPT | Performed by: FAMILY MEDICINE

## 2017-08-28 PROCEDURE — 36415 COLL VENOUS BLD VENIPUNCTURE: CPT | Performed by: FAMILY MEDICINE

## 2017-08-28 NOTE — PROGRESS NOTES
[address]  PROGRESS NOTE  Chief Complaint:   Patient presents with: Follow - Up: bloodwork and stool lab results      HPI:   This is a 78year old female coming in for recheck.   Patient's overall symptoms of abdominal discomfort has im (Patient taking differently: Take 5 mg by mouth as needed.  ) Disp: 30 tablet Rfl: 0   Ospemifene (OSPHENA) 60 MG Oral Tab Take by mouth daily.  Disp:  Rfl:    Calcium Carb-Cholecalciferol (CALCIUM 500 +D) 500-400 MG-UNIT Oral Tab Take 500 mg by mouth daily If abnormal with then need to workup. If normal then most likely patient had viral type of infection.     Health Maintenance:  Annual Physical due on 04/12/1952  FIT Colorectal Screening due on 04/12/2001  Colonoscopy,10 Years due on 04/12/2001  Adult Pne

## 2017-08-29 ENCOUNTER — TELEPHONE (OUTPATIENT)
Dept: FAMILY MEDICINE CLINIC | Facility: CLINIC | Age: 67
End: 2017-08-29

## 2017-08-29 NOTE — TELEPHONE ENCOUNTER
----- Message from Eboni May MD sent at 8/29/2017  8:19 AM CDT -----  Nonfasting labs are normal now. Please inform patient that her hemoglobin, platelet count, sodium, and albumin are all back to normal.  I am pleased with these numbers.   I would

## 2017-08-31 ENCOUNTER — OFFICE VISIT (OUTPATIENT)
Dept: FAMILY MEDICINE CLINIC | Facility: CLINIC | Age: 67
End: 2017-08-31

## 2017-08-31 VITALS
BODY MASS INDEX: 26 KG/M2 | SYSTOLIC BLOOD PRESSURE: 118 MMHG | HEART RATE: 74 BPM | OXYGEN SATURATION: 100 % | TEMPERATURE: 99 F | WEIGHT: 150.38 LBS | DIASTOLIC BLOOD PRESSURE: 70 MMHG

## 2017-08-31 DIAGNOSIS — N95.2 ATROPHIC VAGINITIS: ICD-10-CM

## 2017-08-31 DIAGNOSIS — N76.1 SUBACUTE VAGINITIS: Primary | ICD-10-CM

## 2017-08-31 PROCEDURE — 87480 CANDIDA DNA DIR PROBE: CPT | Performed by: NURSE PRACTITIONER

## 2017-08-31 PROCEDURE — 87660 TRICHOMONAS VAGIN DIR PROBE: CPT | Performed by: NURSE PRACTITIONER

## 2017-08-31 PROCEDURE — 99214 OFFICE O/P EST MOD 30 MIN: CPT | Performed by: NURSE PRACTITIONER

## 2017-08-31 PROCEDURE — 87510 GARDNER VAG DNA DIR PROBE: CPT | Performed by: NURSE PRACTITIONER

## 2017-08-31 RX ORDER — ESTRADIOL 0.1 MG/G
CREAM VAGINAL
Qty: 1 TUBE | Refills: 0 | COMMUNITY
Start: 2017-08-31 | End: 2020-10-27

## 2017-08-31 NOTE — PATIENT INSTRUCTIONS
We will check vaginitis swab–will check for yeast, bacterial vaginosis, trichomonas. Results should be back tomorrow. You may try a and D ointment or Vaseline externally.     If vaginitis swab is negative I would recommend pea-sized amount of Estrace cr

## 2017-08-31 NOTE — PROGRESS NOTES
Patient ID:   Alfonzo Phelan  is a 78year old female    Allergies    Penicillins               Medications     Estradiol 0.1 MG/GM Vaginal Cream Apply pea-sized amount to vaginal opening as needed for discomfort.  Disp: 1 Tube Rfl: 0   Solifenacin Succ Pulse 74   Temp 98.5 °F (36.9 °C) (Tympanic)   Wt 150 lb 6.4 oz   SpO2 100%   BMI 25.62 kg/m²   Constitutional: well-developed and well-nourished. No distress.    Cardiovascular: Regular rate and rhythm no murmur  Pulmonary: Lungs are clear to auscultation

## 2017-09-01 ENCOUNTER — TELEPHONE (OUTPATIENT)
Dept: FAMILY MEDICINE CLINIC | Facility: CLINIC | Age: 67
End: 2017-09-01

## 2017-09-01 NOTE — TELEPHONE ENCOUNTER
----- Message from MARIANO Baker sent at 9/1/2017  9:09 AM CDT -----  Please notify patient that vaginosis swab is negative for yeast, bacterial vaginosis, and trichomonas.   I would recommend that she does a trial with Estrace cream as we discussed

## 2017-09-18 ENCOUNTER — OFFICE VISIT (OUTPATIENT)
Dept: FAMILY MEDICINE CLINIC | Facility: CLINIC | Age: 67
End: 2017-09-18

## 2017-09-18 VITALS
DIASTOLIC BLOOD PRESSURE: 82 MMHG | RESPIRATION RATE: 16 BRPM | WEIGHT: 154 LBS | TEMPERATURE: 97 F | HEART RATE: 68 BPM | BODY MASS INDEX: 25.66 KG/M2 | HEIGHT: 65 IN | SYSTOLIC BLOOD PRESSURE: 120 MMHG

## 2017-09-18 DIAGNOSIS — N39.0 URINARY TRACT INFECTION WITHOUT HEMATURIA, SITE UNSPECIFIED: Primary | ICD-10-CM

## 2017-09-18 LAB
APPEARANCE: CLEAR
BILIRUBIN: NEGATIVE
GLUCOSE (URINE DIPSTICK): NEGATIVE MG/DL
KETONES (URINE DIPSTICK): NEGATIVE MG/DL
LEUKOCYTES: NEGATIVE
MULTISTIX LOT#: NORMAL NUMERIC
NITRITE, URINE: NEGATIVE
PH, URINE: 6.5 (ref 4.5–8)
PROTEIN (URINE DIPSTICK): NEGATIVE MG/DL
SPECIFIC GRAVITY: 1.01 (ref 1–1.03)
URINE-COLOR: YELLOW
UROBILINOGEN,SEMI-QN: 0.2 MG/DL (ref 0–1.9)

## 2017-09-18 PROCEDURE — 99213 OFFICE O/P EST LOW 20 MIN: CPT | Performed by: FAMILY MEDICINE

## 2017-09-18 PROCEDURE — 81003 URINALYSIS AUTO W/O SCOPE: CPT | Performed by: FAMILY MEDICINE

## 2017-09-18 RX ORDER — NITROFURANTOIN MACROCRYSTALS 100 MG/1
1 CAPSULE ORAL AS DIRECTED
COMMUNITY
End: 2018-08-07 | Stop reason: ALTCHOICE

## 2017-09-18 RX ORDER — CIPROFLOXACIN 500 MG/1
500 TABLET, FILM COATED ORAL 2 TIMES DAILY
COMMUNITY
Start: 2017-09-15 | End: 2017-09-21

## 2017-09-18 NOTE — PROGRESS NOTES
[facility] [place]  PROGRESS NOTE  Chief Complaint:   Patient presents with:  Hospital F/U: UTI, pt think antibiotic is not working      HPI:   This is a 78year old female coming in for follow-up for urinary tract infection.   This patient has h status: Never Smoker                                                              Smokeless tobacco: Never Used                      Alcohol use: Yes           0.0 oz/week     Comment: 1/ month    Family History:  Family History   Problem Relation Age of O as of this encounter: 65\". Weight as of this encounter: 154 lb. Vital signs reviewed. Appears stated age, well groomed  Physical Exam:  GEN:  Patient is alert, awake and oriented, well developed, well nourished, no apparent distress.   NECK: Supple, no with any side effects or complications from the treatments as a result of today.      Problem List:  Patient Active Problem List:     Familial multiple lipoprotein-type hyperlipidemia     Benign neoplasm of cerebral meninges (HCC)     Migraine     Headache

## 2017-09-18 NOTE — PATIENT INSTRUCTIONS
Finish 1 more day of Cipro which would complete 5 full days. If no improvement in symptoms then labs and ultrasound of the abdomen.

## 2017-09-21 ENCOUNTER — OFFICE VISIT (OUTPATIENT)
Dept: FAMILY MEDICINE CLINIC | Facility: CLINIC | Age: 67
End: 2017-09-21

## 2017-09-21 VITALS
HEIGHT: 65 IN | DIASTOLIC BLOOD PRESSURE: 72 MMHG | SYSTOLIC BLOOD PRESSURE: 118 MMHG | TEMPERATURE: 99 F | BODY MASS INDEX: 25.68 KG/M2 | WEIGHT: 154.13 LBS | HEART RATE: 72 BPM | RESPIRATION RATE: 16 BRPM

## 2017-09-21 DIAGNOSIS — R10.2 PELVIC PAIN: ICD-10-CM

## 2017-09-21 DIAGNOSIS — R30.0 DYSURIA: Primary | ICD-10-CM

## 2017-09-21 LAB
APPEARANCE: CLEAR
MULTISTIX LOT#: NORMAL NUMERIC
PH, URINE: 6 (ref 4.5–8)
SPECIFIC GRAVITY: 1.01 (ref 1–1.03)
URINE-COLOR: YELLOW
UROBILINOGEN,SEMI-QN: 0.2 MG/DL (ref 0–1.9)

## 2017-09-21 PROCEDURE — 87086 URINE CULTURE/COLONY COUNT: CPT | Performed by: FAMILY MEDICINE

## 2017-09-21 PROCEDURE — 99213 OFFICE O/P EST LOW 20 MIN: CPT | Performed by: FAMILY MEDICINE

## 2017-09-21 PROCEDURE — 81003 URINALYSIS AUTO W/O SCOPE: CPT | Performed by: FAMILY MEDICINE

## 2017-09-21 NOTE — PROGRESS NOTES
[facility] [place]  PROGRESS NOTE  Chief Complaint:   Patient presents with:  UTI: Some symptoms improved - but still lots of cramping      HPI:   This is a 78year old female coming in for persistent symptoms with possible UTI.   This patient wa Alcohol use: Yes           0.0 oz/week     Comment: 1/ month    Family History:  Family History   Problem Relation Age of Onset   • Heart Disease Father    • Hypertension Father    • Prostate Cancer Father    • Breast Cancer Mother developed, well nourished, no apparent distres   CHEST: No tenderness. ABDOMEN:  Soft, nondistended, mild suprapubic tenderness and some moderate left adnexal tenderness. Mild epigastric tenderness as well.   Bowel sounds are normal.  EXTREMITIES:  No phan

## 2017-09-23 ENCOUNTER — TELEPHONE (OUTPATIENT)
Dept: FAMILY MEDICINE CLINIC | Facility: CLINIC | Age: 67
End: 2017-09-23

## 2017-09-25 ENCOUNTER — TELEPHONE (OUTPATIENT)
Dept: FAMILY MEDICINE CLINIC | Facility: CLINIC | Age: 67
End: 2017-09-25

## 2017-09-25 DIAGNOSIS — R10.2 PELVIC PAIN: Primary | ICD-10-CM

## 2017-09-25 NOTE — TELEPHONE ENCOUNTER
----- Message from Litzy Roy MD sent at 9/24/2017  9:14 PM CDT -----  Urine culture is negative. US of pelvis is pending.   Notify pt

## 2017-09-25 NOTE — TELEPHONE ENCOUNTER
Informed pt of her UA results. Pt still is having fevers on and off. Will wait for US. Pt expressed understanding and thanks.

## 2017-09-28 ENCOUNTER — HOSPITAL ENCOUNTER (OUTPATIENT)
Dept: ULTRASOUND IMAGING | Age: 67
Discharge: HOME | End: 2017-09-28
Attending: FAMILY MEDICINE
Payer: MEDICARE

## 2017-09-28 ENCOUNTER — TELEPHONE (OUTPATIENT)
Dept: FAMILY MEDICINE CLINIC | Facility: CLINIC | Age: 67
End: 2017-09-28

## 2017-09-28 DIAGNOSIS — R10.2 PELVIC PAIN: ICD-10-CM

## 2017-09-28 PROCEDURE — 76830 TRANSVAGINAL US NON-OB: CPT | Performed by: FAMILY MEDICINE

## 2017-09-28 PROCEDURE — 76856 US EXAM PELVIC COMPLETE: CPT | Performed by: FAMILY MEDICINE

## 2017-09-28 NOTE — TELEPHONE ENCOUNTER
Received a fax give the results to [address] [facility] which were give to Dr. Kathy Mcmahan.

## 2017-09-28 NOTE — TELEPHONE ENCOUNTER
Wants to give results of pelvic ultrasound.  Stated that there is a finding regarding the ultrasound that Dr Petty Suarez should be aware of

## 2017-12-15 ENCOUNTER — OFFICE VISIT (OUTPATIENT)
Dept: FAMILY MEDICINE CLINIC | Facility: CLINIC | Age: 67
End: 2017-12-15

## 2017-12-15 VITALS
SYSTOLIC BLOOD PRESSURE: 112 MMHG | RESPIRATION RATE: 16 BRPM | TEMPERATURE: 98 F | OXYGEN SATURATION: 93 % | HEIGHT: 65 IN | BODY MASS INDEX: 25.91 KG/M2 | DIASTOLIC BLOOD PRESSURE: 76 MMHG | WEIGHT: 155.5 LBS | HEART RATE: 82 BPM

## 2017-12-15 DIAGNOSIS — J02.9 PHARYNGITIS, UNSPECIFIED ETIOLOGY: Primary | ICD-10-CM

## 2017-12-15 PROCEDURE — 87081 CULTURE SCREEN ONLY: CPT | Performed by: FAMILY MEDICINE

## 2017-12-15 PROCEDURE — 87880 STREP A ASSAY W/OPTIC: CPT | Performed by: FAMILY MEDICINE

## 2017-12-15 PROCEDURE — 99213 OFFICE O/P EST LOW 20 MIN: CPT | Performed by: FAMILY MEDICINE

## 2017-12-15 NOTE — PROGRESS NOTES
[facility]  [facility]  Chief Complaint:   Patient presents with:  Sinus Problem  Sore Throat  Fever: Past 2 days      HPI:   This is a 78year old female coming in for sinus congestion, sore throat, cough over the last 3 days.   Did karlos ONE AND ONE-HALF TABLET BY MOUTH DAILY Disp: 45 tablet Rfl: 1   Zolpidem Tartrate (AMBIEN) 5 MG Oral Tab Take 1 tablet (5 mg total) by mouth daily.  (Patient taking differently: Take 5 mg by mouth as needed.  ) Disp: 30 tablet Rfl: 0   Ospemifene (OSPHENA) negative. Symptoms are consistent with URI. Over-the-counter medications are.   Recheck if no improvement      Health Maintenance:  Annual Physical due on 04/12/1952  DEXA Scan due on 04/12/2016  Pneumococcal PPSV23/PCV13 65+ Years / Low and Medium Risk(1

## 2017-12-19 ENCOUNTER — TELEPHONE (OUTPATIENT)
Dept: FAMILY MEDICINE CLINIC | Facility: CLINIC | Age: 67
End: 2017-12-19

## 2017-12-19 RX ORDER — DOXYCYCLINE HYCLATE 100 MG
100 TABLET ORAL 2 TIMES DAILY
Qty: 20 TABLET | Refills: 0 | Status: SHIPPED | OUTPATIENT
Start: 2017-12-19 | End: 2017-12-26 | Stop reason: ALTCHOICE

## 2017-12-19 NOTE — TELEPHONE ENCOUNTER
I prescribed doxycycline for her. Sent to pharmacy. Please notify patient. Alert and oriented x 3, normal mood and affect, no apparent risk to self or others.

## 2017-12-19 NOTE — TELEPHONE ENCOUNTER
Pt called to inform you that she is feeling worse. Pt is coughing all night, congestion is worse. Pt would like an abx called in. Please advise.

## 2017-12-20 RX ORDER — SOLIFENACIN SUCCINATE 5 MG/1
5 TABLET, FILM COATED ORAL DAILY
Qty: 90 TABLET | Refills: 1 | Status: SHIPPED | OUTPATIENT
Start: 2017-12-20 | End: 2018-07-30

## 2017-12-20 NOTE — TELEPHONE ENCOUNTER
Future appt:    Last Appointment:  12/15/2017  No results found for: CHOLEST, HDL, LDL, TRIGLY, TRIG  No results found for: EAG, A1C  No results found for: T4F, TSH, TSHT4    No Follow-up on file. Pt was in yesterday.

## 2017-12-26 ENCOUNTER — OFFICE VISIT (OUTPATIENT)
Dept: FAMILY MEDICINE CLINIC | Facility: CLINIC | Age: 67
End: 2017-12-26

## 2017-12-26 VITALS
TEMPERATURE: 100 F | SYSTOLIC BLOOD PRESSURE: 112 MMHG | BODY MASS INDEX: 26 KG/M2 | OXYGEN SATURATION: 96 % | DIASTOLIC BLOOD PRESSURE: 70 MMHG | WEIGHT: 155.81 LBS | RESPIRATION RATE: 16 BRPM | HEART RATE: 94 BPM

## 2017-12-26 DIAGNOSIS — J20.9 ACUTE BRONCHITIS, UNSPECIFIED ORGANISM: Primary | ICD-10-CM

## 2017-12-26 PROCEDURE — 99214 OFFICE O/P EST MOD 30 MIN: CPT | Performed by: NURSE PRACTITIONER

## 2017-12-26 RX ORDER — AZITHROMYCIN 250 MG/1
TABLET, FILM COATED ORAL
Qty: 6 TABLET | Refills: 0 | Status: SHIPPED | OUTPATIENT
Start: 2017-12-26 | End: 2018-02-01 | Stop reason: ALTCHOICE

## 2017-12-26 RX ORDER — FLUCONAZOLE 150 MG/1
TABLET ORAL
Refills: 1 | COMMUNITY
Start: 2017-12-14 | End: 2018-02-01

## 2017-12-26 RX ORDER — NITROFURANTOIN 25; 75 MG/1; MG/1
CAPSULE ORAL
Refills: 6 | COMMUNITY
Start: 2017-12-14 | End: 2017-12-26

## 2017-12-26 NOTE — PROGRESS NOTES
Chief complaint:  Patient presents with: Other: [id number] cough, chest congestion since last seeing Dr. Suzanna Gregory      HPI:   Alexandra Islas is a 78year old female who presents for upper respiratory symptoms for  11  days.   Patient states that sh daily. Disp:  Rfl:       Past Medical History:   Diagnosis Date   • Depression    • Endometriosis    • History of malaria    • Meningioma (HCC)    • Microscopic hematuria    • Ovarian cyst    • Pineal gland cyst       Past Surgical History:  No date: CATAR without murmur  Psych: Alert and orientated ×3, appropriate affect      ASSESSMENT AND PLAN:   Roro Bardales is a 78year old female who presents with Other ([id number] cough, chest congestion since last seeing Dr. Sylvester Carmen).  Symptoms are consiste

## 2017-12-26 NOTE — PATIENT INSTRUCTIONS
Push fluids, rest.  Stop doxycycline, start zpack, take with food  Flonase (fluticasone is generic) nasal spray--1 spray each nostril twice a day. Look down at toes when using this nasal spray.   Tylenol or ibuprofen over the counter for discomfort and feve

## 2018-01-25 ENCOUNTER — TELEPHONE (OUTPATIENT)
Dept: FAMILY MEDICINE CLINIC | Facility: CLINIC | Age: 68
End: 2018-01-25

## 2018-01-25 DIAGNOSIS — R42 VERTIGO: Primary | ICD-10-CM

## 2018-01-26 NOTE — TELEPHONE ENCOUNTER
Pt would like a referral to [facility] for vertigo. Pt is having dizzy spells again. Pt can get in today if a referral is faxed over.

## 2018-02-01 ENCOUNTER — OFFICE VISIT (OUTPATIENT)
Dept: FAMILY MEDICINE CLINIC | Facility: CLINIC | Age: 68
End: 2018-02-01

## 2018-02-01 VITALS
HEART RATE: 72 BPM | HEIGHT: 65 IN | RESPIRATION RATE: 16 BRPM | TEMPERATURE: 98 F | DIASTOLIC BLOOD PRESSURE: 78 MMHG | SYSTOLIC BLOOD PRESSURE: 122 MMHG | WEIGHT: 157.81 LBS | BODY MASS INDEX: 26.29 KG/M2

## 2018-02-01 DIAGNOSIS — H81.11 BENIGN PAROXYSMAL POSITIONAL VERTIGO OF RIGHT EAR: Primary | ICD-10-CM

## 2018-02-01 PROCEDURE — 99214 OFFICE O/P EST MOD 30 MIN: CPT | Performed by: FAMILY MEDICINE

## 2018-02-01 RX ORDER — RIZATRIPTAN BENZOATE 10 MG/1
1 TABLET ORAL AS NEEDED
COMMUNITY
Start: 2018-01-19 | End: 2019-04-09

## 2018-02-01 NOTE — PROGRESS NOTES
[facility] [place]  PROGRESS NOTE  Chief Complaint:   Patient presents with:  Dizziness: dizzy for a couple weeks  Ear Pain: ears have been hurting      HPI:   This is a 78year old female presents complaining of vertigo for past 2 weeks.   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  Disp:  Rfl:    VESICARE 5 MG Oral Tab TAKE 1 TABLET (5 MG TOTAL) BY extremities,change in bowel or bladder control. See HPI  LYMPHATICS:  Denies enlarged nodes   PSYCHIATRIC:  Denies depression or anxiety. ENDOCRINOLOGIC:  Denies excessive sweating, cold or heat intolerance, polyuria or polydipsia.       EXAM:   /78 depressed mood or anxiety      ASSESSMENT AND PLAN:   Julianna Corcoran was seen today for dizziness and ear pain.     Diagnoses and all orders for this visit:    Benign paroxysmal positional vertigo of right ear  -     ENT - EXTERNAL      Patient Instructions   Ken

## 2018-02-01 NOTE — PATIENT INSTRUCTIONS
Recommend to avoid changing position too quickly. Trial of meclizine or antivert 25 mg tid as needed   Medication will make you drowsy. May also try flonase and claritin. See Dr Guadalupe Preciado if no improvement.

## 2018-02-02 ENCOUNTER — TELEPHONE (OUTPATIENT)
Dept: FAMILY MEDICINE CLINIC | Facility: CLINIC | Age: 68
End: 2018-02-02

## 2018-02-02 DIAGNOSIS — H81.11 BENIGN PAROXYSMAL POSITIONAL VERTIGO OF RIGHT EAR: Primary | ICD-10-CM

## 2018-02-02 NOTE — TELEPHONE ENCOUNTER
Left message that referral and office note was faxed to [facility]. Pt will schedule an appt.

## 2018-02-02 NOTE — TELEPHONE ENCOUNTER
Pt can't get into ENT one place the end of February and other the end of May. Pt has been to PT before but the last time she went PT did not help. Pt would like a referral to [facility] and [facility] [address] [phone] (fax). Please advise.

## 2018-02-05 ENCOUNTER — TELEPHONE (OUTPATIENT)
Dept: FAMILY MEDICINE CLINIC | Facility: CLINIC | Age: 68
End: 2018-02-05

## 2018-02-05 DIAGNOSIS — H81.13 BENIGN PAROXYSMAL POSITIONAL VERTIGO DUE TO BILATERAL VESTIBULAR DISORDER: Primary | ICD-10-CM

## 2018-02-13 ENCOUNTER — OFFICE VISIT (OUTPATIENT)
Dept: FAMILY MEDICINE CLINIC | Facility: CLINIC | Age: 68
End: 2018-02-13

## 2018-02-13 VITALS
SYSTOLIC BLOOD PRESSURE: 124 MMHG | DIASTOLIC BLOOD PRESSURE: 74 MMHG | WEIGHT: 158.13 LBS | BODY MASS INDEX: 26.35 KG/M2 | HEART RATE: 52 BPM | RESPIRATION RATE: 14 BRPM | HEIGHT: 65 IN | TEMPERATURE: 98 F | OXYGEN SATURATION: 98 %

## 2018-02-13 DIAGNOSIS — H83.02 LABYRINTHITIS OF LEFT EAR: Primary | ICD-10-CM

## 2018-02-13 DIAGNOSIS — H92.02 LEFT EAR PAIN: ICD-10-CM

## 2018-02-13 PROCEDURE — 99213 OFFICE O/P EST LOW 20 MIN: CPT | Performed by: FAMILY MEDICINE

## 2018-02-13 RX ORDER — MECLIZINE HYDROCHLORIDE 25 MG/1
25 TABLET ORAL 3 TIMES DAILY PRN
COMMUNITY
End: 2018-02-13

## 2018-02-13 RX ORDER — METHYLPREDNISOLONE 4 MG/1
1 TABLET ORAL AS DIRECTED
COMMUNITY
Start: 2018-02-11 | End: 2018-08-07 | Stop reason: ALTCHOICE

## 2018-02-13 RX ORDER — MECLIZINE HYDROCHLORIDE 25 MG/1
25 TABLET ORAL 3 TIMES DAILY PRN
Qty: 90 TABLET | Refills: 1 | Status: SHIPPED | OUTPATIENT
Start: 2018-02-13 | End: 2018-08-07 | Stop reason: ALTCHOICE

## 2018-02-13 NOTE — PROGRESS NOTES
[facility]  [facility]  Chief Complaint:   Patient presents with:  Hospital F/U      HPI:   This is a 78year old female coming in for follow-up of an emergency room visit.   She started having vertigo and trouble with her balance the cyst    • Pineal gland cyst      Past Surgical History:  No date: CATARACT  No date: REMOVAL OF OVARIAN CYST(S)      Comment: bilateral  No date: TONSILLECTOMY  Social History:  Smoking status: Never Smoker sometimes feel nauseated but she has not had any. EENT:  Eyes:  Denies eye pain, visual loss, blurred vision, double vision or yellow sclerae. Ears, Nose, Throat: Her ear pain is improved. She does not have hearing loss.   INTEGUMENTARY:  Denies rashes, i good dentition. NECK: Supple, no CLAD, no JVD, no thyromegaly. SKIN: No rashes, no skin lesion, no bruising, good turgor. HEART:  Regular rate and rhythm, no murmurs, rubs or gallops. LUNGS: Clear to auscultation bilterally, no rales/rhonchi/wheezing.

## 2018-03-26 RX ORDER — CITALOPRAM 20 MG/1
20 TABLET ORAL DAILY
Qty: 90 TABLET | Refills: 1 | Status: SHIPPED | OUTPATIENT
Start: 2018-03-26 | End: 2018-08-07

## 2018-07-30 RX ORDER — SOLIFENACIN SUCCINATE 5 MG/1
5 TABLET, FILM COATED ORAL DAILY
Qty: 90 TABLET | Refills: 1 | Status: SHIPPED | OUTPATIENT
Start: 2018-07-30 | End: 2019-02-11

## 2018-07-30 NOTE — TELEPHONE ENCOUNTER
Future appt:    Last Appointment:  12/15/2017; No f/u recommended    No results found for: CHOLEST, HDL, LDL, TRIGLY, TRIG  No results found for: EAG, A1C  No results found for: T4F, TSH, TSHT4    Last RF:  12/20/2017    No Follow-up on file.

## 2018-08-07 ENCOUNTER — OFFICE VISIT (OUTPATIENT)
Dept: FAMILY MEDICINE CLINIC | Facility: CLINIC | Age: 68
End: 2018-08-07
Payer: MEDICARE

## 2018-08-07 VITALS
TEMPERATURE: 99 F | DIASTOLIC BLOOD PRESSURE: 68 MMHG | HEART RATE: 61 BPM | OXYGEN SATURATION: 99 % | HEIGHT: 65 IN | BODY MASS INDEX: 26.49 KG/M2 | SYSTOLIC BLOOD PRESSURE: 120 MMHG | WEIGHT: 159 LBS

## 2018-08-07 DIAGNOSIS — N76.0 ACUTE VAGINITIS: Primary | ICD-10-CM

## 2018-08-07 PROCEDURE — 87480 CANDIDA DNA DIR PROBE: CPT | Performed by: NURSE PRACTITIONER

## 2018-08-07 PROCEDURE — 87660 TRICHOMONAS VAGIN DIR PROBE: CPT | Performed by: NURSE PRACTITIONER

## 2018-08-07 PROCEDURE — 99213 OFFICE O/P EST LOW 20 MIN: CPT | Performed by: NURSE PRACTITIONER

## 2018-08-07 PROCEDURE — 87510 GARDNER VAG DNA DIR PROBE: CPT | Performed by: NURSE PRACTITIONER

## 2018-08-07 RX ORDER — FLUCONAZOLE 150 MG/1
150 TABLET ORAL ONCE
Qty: 1 TABLET | Refills: 0 | Status: SHIPPED | OUTPATIENT
Start: 2018-08-07 | End: 2018-08-07

## 2018-08-07 NOTE — PROGRESS NOTES
HPI:    Patient ID: Alda Wills is a 80year old female. HPI     Having some vaginal discharge that got worse over the weekend. Took a one day dose of Monistat. It's better, but not gone. Tends to sweat a lot to the pelvic area.  Has hx of yeas right labia. There is no rash on the left labia. Vaginal discharge (thick pale yellow discharge) found. Genitourinary Comments: States to be tender around the vagina   Neurological: She is alert and oriented to person, place, and time.    Skin: Skin is wa

## 2018-08-07 NOTE — PATIENT INSTRUCTIONS
Vaginal culture pending. Take Diflucan today. Okay to use Monistat cream externally. Recommend patient to schedule her Medicare wellness exam with Dr. Bj Jones as needed.

## 2018-08-08 ENCOUNTER — TELEPHONE (OUTPATIENT)
Dept: FAMILY MEDICINE CLINIC | Facility: CLINIC | Age: 68
End: 2018-08-08

## 2018-08-08 NOTE — TELEPHONE ENCOUNTER
Informed pt of her vaginal swab results. Pt expressed understanding and thank.   Will return if sxs persist.

## 2018-08-08 NOTE — TELEPHONE ENCOUNTER
----- Message from MAIRANO Wild sent at 8/8/2018  3:30 PM CDT -----  Vaginal swab is negative for BV, trich and yeast. Please let patient know.  Recommend to return to clinic if symptoms persist.

## 2018-08-15 ENCOUNTER — OFFICE VISIT (OUTPATIENT)
Dept: FAMILY MEDICINE CLINIC | Facility: CLINIC | Age: 68
End: 2018-08-15
Payer: MEDICARE

## 2018-08-15 VITALS
BODY MASS INDEX: 26.1 KG/M2 | DIASTOLIC BLOOD PRESSURE: 64 MMHG | HEIGHT: 65 IN | WEIGHT: 156.63 LBS | SYSTOLIC BLOOD PRESSURE: 120 MMHG | TEMPERATURE: 98 F | RESPIRATION RATE: 16 BRPM | HEART RATE: 68 BPM

## 2018-08-15 DIAGNOSIS — E78.5 HYPERLIPIDEMIA, UNSPECIFIED HYPERLIPIDEMIA TYPE: ICD-10-CM

## 2018-08-15 DIAGNOSIS — F32.A DEPRESSION, UNSPECIFIED DEPRESSION TYPE: ICD-10-CM

## 2018-08-15 DIAGNOSIS — G43.009 MIGRAINE WITHOUT AURA AND WITHOUT STATUS MIGRAINOSUS, NOT INTRACTABLE: ICD-10-CM

## 2018-08-15 DIAGNOSIS — R53.83 FATIGUE, UNSPECIFIED TYPE: ICD-10-CM

## 2018-08-15 DIAGNOSIS — E53.8 B12 DEFICIENCY: ICD-10-CM

## 2018-08-15 DIAGNOSIS — Z00.00 HEALTH CARE MAINTENANCE: Primary | ICD-10-CM

## 2018-08-15 DIAGNOSIS — S29.9XXD CHEST INJURY, SUBSEQUENT ENCOUNTER: ICD-10-CM

## 2018-08-15 DIAGNOSIS — G47.00 INSOMNIA, UNSPECIFIED TYPE: ICD-10-CM

## 2018-08-15 PROBLEM — H83.02 LABYRINTHITIS OF LEFT EAR: Status: RESOLVED | Noted: 2018-02-11 | Resolved: 2018-08-15

## 2018-08-15 PROBLEM — H92.02 LEFT EAR PAIN: Status: RESOLVED | Noted: 2018-02-11 | Resolved: 2018-08-15

## 2018-08-15 PROCEDURE — G0009 ADMIN PNEUMOCOCCAL VACCINE: HCPCS | Performed by: FAMILY MEDICINE

## 2018-08-15 PROCEDURE — G0439 PPPS, SUBSEQ VISIT: HCPCS | Performed by: FAMILY MEDICINE

## 2018-08-15 PROCEDURE — 90670 PCV13 VACCINE IM: CPT | Performed by: FAMILY MEDICINE

## 2018-08-15 RX ORDER — HYDROCODONE BITARTRATE AND ACETAMINOPHEN 5; 325 MG/1; MG/1
TABLET ORAL
COMMUNITY
Start: 2018-08-14 | End: 2018-09-14 | Stop reason: ALTCHOICE

## 2018-08-15 RX ORDER — ZOLPIDEM TARTRATE 5 MG/1
5 TABLET ORAL AS NEEDED
Qty: 30 TABLET | Refills: 0 | Status: SHIPPED
Start: 2018-08-15 | End: 2019-05-09

## 2018-08-15 NOTE — PROGRESS NOTES
[facility] [place]  PROGRESS NOTE  Chief Complaint:   Patient presents with:  Physical      HPI:   This is a 80year old female coming in for general wellness and recheck of problems.   Patient sees gynecologist.  Has been feeling well in general Current Meds:    Current Outpatient Prescriptions:  HYDROcodone-acetaminophen 5-325 MG Oral Tab  Disp:  Rfl:    Zolpidem Tartrate (AMBIEN) 5 MG Oral Tab Take 1 tablet (5 mg total) by mouth as needed.  Disp: 30 tablet Rfl: 0   VESICARE 5 MG Oral Tab TAKE Location: Left arm, Patient Position: Sitting, Cuff Size: adult)   Pulse 68   Temp 98 °F (36.7 °C) (Temporal)   Resp 16   Ht 65\"   Wt 156 lb 9.6 oz   BMI 26.06 kg/m²  Estimated body mass index is 26.06 kg/m² as calculated from the following:    Height as mammogram.  Sees a gynecologist.  1.  History of migraines: Controlled. 2.  History of depression: We will be weaning citalopram.  3.  Right chest injury: Rest.  Recheck if worsens. 4.  History of mild hyperlipidemia: Continue to watch diet.   5.  History

## 2018-08-17 ENCOUNTER — LABORATORY ENCOUNTER (OUTPATIENT)
Dept: LAB | Age: 68
End: 2018-08-17
Attending: FAMILY MEDICINE
Payer: MEDICARE

## 2018-08-17 DIAGNOSIS — E53.8 B12 DEFICIENCY: ICD-10-CM

## 2018-08-17 DIAGNOSIS — E78.5 HYPERLIPIDEMIA, UNSPECIFIED HYPERLIPIDEMIA TYPE: ICD-10-CM

## 2018-08-17 DIAGNOSIS — R53.83 FATIGUE, UNSPECIFIED TYPE: ICD-10-CM

## 2018-08-17 LAB
ALBUMIN SERPL-MCNC: 3.7 G/DL (ref 3.5–4.8)
ALBUMIN/GLOB SERPL: 1 {RATIO} (ref 1–2)
ALP LIVER SERPL-CCNC: 58 U/L (ref 55–142)
ALT SERPL-CCNC: 15 U/L (ref 14–54)
ANION GAP SERPL CALC-SCNC: 10 MMOL/L (ref 0–18)
AST SERPL-CCNC: 23 U/L (ref 15–41)
BASOPHILS # BLD AUTO: 0.03 X10(3) UL (ref 0–0.1)
BASOPHILS NFR BLD AUTO: 0.5 %
BILIRUB SERPL-MCNC: 0.4 MG/DL (ref 0.1–2)
BUN BLD-MCNC: 16 MG/DL (ref 8–20)
BUN/CREAT SERPL: 16 (ref 10–20)
CALCIUM BLD-MCNC: 9.3 MG/DL (ref 8.3–10.3)
CHLORIDE SERPL-SCNC: 105 MMOL/L (ref 101–111)
CHOLEST SMN-MCNC: 197 MG/DL (ref ?–200)
CO2 SERPL-SCNC: 24 MMOL/L (ref 22–32)
CREAT BLD-MCNC: 1 MG/DL (ref 0.55–1.02)
EOSINOPHIL # BLD AUTO: 0.14 X10(3) UL (ref 0–0.3)
EOSINOPHIL NFR BLD AUTO: 2.6 %
ERYTHROCYTE [DISTWIDTH] IN BLOOD BY AUTOMATED COUNT: 12.3 % (ref 11.5–16)
GLOBULIN PLAS-MCNC: 3.7 G/DL (ref 2.5–4)
GLUCOSE BLD-MCNC: 71 MG/DL (ref 70–99)
HAV AB SERPL IA-ACNC: 1368 PG/ML (ref 193–986)
HCT VFR BLD AUTO: 38.9 % (ref 34–50)
HDLC SERPL-MCNC: 74 MG/DL (ref 40–59)
HGB BLD-MCNC: 12.4 G/DL (ref 12–16)
IMMATURE GRANULOCYTE COUNT: 0.01 X10(3) UL (ref 0–1)
IMMATURE GRANULOCYTE RATIO %: 0.2 %
LDLC SERPL CALC-MCNC: 108 MG/DL (ref ?–100)
LYMPHOCYTES # BLD AUTO: 1.66 X10(3) UL (ref 0.9–4)
LYMPHOCYTES NFR BLD AUTO: 30.4 %
M PROTEIN MFR SERPL ELPH: 7.4 G/DL (ref 6.1–8.3)
MCH RBC QN AUTO: 30.5 PG (ref 27–33.2)
MCHC RBC AUTO-ENTMCNC: 31.9 G/DL (ref 31–37)
MCV RBC AUTO: 95.8 FL (ref 81–100)
MONOCYTES # BLD AUTO: 0.24 X10(3) UL (ref 0.1–1)
MONOCYTES NFR BLD AUTO: 4.4 %
NEUTROPHIL ABS PRELIM: 3.38 X10 (3) UL (ref 1.3–6.7)
NEUTROPHILS # BLD AUTO: 3.38 X10(3) UL (ref 1.3–6.7)
NEUTROPHILS NFR BLD AUTO: 61.9 %
NONHDLC SERPL-MCNC: 123 MG/DL (ref ?–130)
OSMOLALITY SERPL CALC.SUM OF ELEC: 288 MOSM/KG (ref 275–295)
PLATELET # BLD AUTO: 306 10(3)UL (ref 150–450)
POTASSIUM SERPL-SCNC: 4.4 MMOL/L (ref 3.6–5.1)
RBC # BLD AUTO: 4.06 X10(6)UL (ref 3.8–5.1)
RED CELL DISTRIBUTION WIDTH-SD: 43.2 FL (ref 35.1–46.3)
SODIUM SERPL-SCNC: 139 MMOL/L (ref 136–144)
TRIGL SERPL-MCNC: 73 MG/DL (ref 30–149)
TSI SER-ACNC: 2.9 MIU/ML (ref 0.35–5.5)
VLDLC SERPL CALC-MCNC: 15 MG/DL (ref 0–30)
WBC # BLD AUTO: 5.5 X10(3) UL (ref 4–13)

## 2018-08-17 PROCEDURE — 82607 VITAMIN B-12: CPT

## 2018-08-17 PROCEDURE — 84443 ASSAY THYROID STIM HORMONE: CPT

## 2018-08-17 PROCEDURE — 80061 LIPID PANEL: CPT

## 2018-08-17 PROCEDURE — 36415 COLL VENOUS BLD VENIPUNCTURE: CPT

## 2018-08-17 PROCEDURE — 80053 COMPREHEN METABOLIC PANEL: CPT

## 2018-08-17 PROCEDURE — 85025 COMPLETE CBC W/AUTO DIFF WBC: CPT

## 2018-08-20 ENCOUNTER — TELEPHONE (OUTPATIENT)
Dept: FAMILY MEDICINE CLINIC | Facility: CLINIC | Age: 68
End: 2018-08-20

## 2018-08-20 NOTE — TELEPHONE ENCOUNTER
----- Message from Morris Vazquez MD sent at 8/19/2018 12:45 PM CDT -----  Labs are normal.  Cholesterol is good at 197 with an excellent HDL of 74 and a good LDL of 108.   Vitamin B12 is very good, actually above normal.  Please notify patient

## 2018-09-14 ENCOUNTER — OFFICE VISIT (OUTPATIENT)
Dept: FAMILY MEDICINE CLINIC | Facility: CLINIC | Age: 68
End: 2018-09-14
Payer: MEDICARE

## 2018-09-14 ENCOUNTER — HOSPITAL ENCOUNTER (OUTPATIENT)
Dept: GENERAL RADIOLOGY | Age: 68
Discharge: HOME | End: 2018-09-14
Attending: FAMILY MEDICINE
Payer: MEDICARE

## 2018-09-14 VITALS
DIASTOLIC BLOOD PRESSURE: 80 MMHG | HEART RATE: 70 BPM | SYSTOLIC BLOOD PRESSURE: 110 MMHG | BODY MASS INDEX: 26.16 KG/M2 | TEMPERATURE: 99 F | HEIGHT: 65 IN | WEIGHT: 157 LBS | RESPIRATION RATE: 14 BRPM

## 2018-09-14 DIAGNOSIS — S99.912A INJURY OF LEFT ANKLE, INITIAL ENCOUNTER: Primary | ICD-10-CM

## 2018-09-14 DIAGNOSIS — S99.912A INJURY OF LEFT ANKLE, INITIAL ENCOUNTER: ICD-10-CM

## 2018-09-14 PROCEDURE — 73610 X-RAY EXAM OF ANKLE: CPT | Performed by: FAMILY MEDICINE

## 2018-09-14 PROCEDURE — 99213 OFFICE O/P EST LOW 20 MIN: CPT | Performed by: FAMILY MEDICINE

## 2018-09-14 RX ORDER — CITALOPRAM 20 MG/1
10 TABLET ORAL DAILY
COMMUNITY
End: 2018-12-13

## 2018-09-14 RX ORDER — IBUPROFEN 400 MG/1
400 TABLET ORAL EVERY 6 HOURS PRN
COMMUNITY
End: 2019-05-09

## 2018-09-14 NOTE — PROGRESS NOTES
[address]  PROGRESS NOTE  Chief Complaint:   Patient presents with: Ankle Pain: Twisted left ankle in [facility] x2 weeks ago - swollen, hurts to walk on      HPI:   This is a 80year old female coming in for left ankle injury 2 weeks ago. 37.0 g/dL    RDW 12.3 11.5 - 16.0 %    RDW-SD 43.2 35.1 - 46.3 fL    Neutrophil Absolute Prelim 3.38 1.30 - 6.70 x10 (3) uL    Neutrophil Absolute 3.38 1.30 - 6.70 x10(3) uL    Lymphocyte Absolute 1.66 0.90 - 4.00 x10(3) uL    Monocyte Absolute 0.24 0.10 - for discomfort. Disp: 1 Tube Rfl: 0   Ospemifene (OSPHENA) 60 MG Oral Tab Take by mouth daily. Disp:  Rfl:    Calcium Carb-Cholecalciferol (CALCIUM 500 +D) 500-400 MG-UNIT Oral Tab Take 500 mg by mouth daily.    Disp:  Rfl:    Vitamin B-12 100 MCG Oral Tab understanding. Patient is notified to call with any questions, complications, allergies, or worsening or changing symptoms. Patient is to call with any side effects or complications from the treatments as a result of today.      Problem List:  Patient Acti

## 2018-09-18 ENCOUNTER — TELEPHONE (OUTPATIENT)
Dept: FAMILY MEDICINE CLINIC | Facility: CLINIC | Age: 68
End: 2018-09-18

## 2018-09-18 ENCOUNTER — OFFICE VISIT (OUTPATIENT)
Dept: FAMILY MEDICINE CLINIC | Facility: CLINIC | Age: 68
End: 2018-09-18
Payer: MEDICARE

## 2018-09-18 VITALS
BODY MASS INDEX: 24.48 KG/M2 | OXYGEN SATURATION: 100 % | HEIGHT: 66.75 IN | DIASTOLIC BLOOD PRESSURE: 70 MMHG | HEART RATE: 67 BPM | SYSTOLIC BLOOD PRESSURE: 122 MMHG | WEIGHT: 156 LBS | TEMPERATURE: 98 F

## 2018-09-18 DIAGNOSIS — N76.0 ACUTE VAGINITIS: Primary | ICD-10-CM

## 2018-09-18 PROCEDURE — 87660 TRICHOMONAS VAGIN DIR PROBE: CPT | Performed by: NURSE PRACTITIONER

## 2018-09-18 PROCEDURE — 87480 CANDIDA DNA DIR PROBE: CPT | Performed by: NURSE PRACTITIONER

## 2018-09-18 PROCEDURE — 87510 GARDNER VAG DNA DIR PROBE: CPT | Performed by: NURSE PRACTITIONER

## 2018-09-18 PROCEDURE — 99214 OFFICE O/P EST MOD 30 MIN: CPT | Performed by: NURSE PRACTITIONER

## 2018-09-18 NOTE — PATIENT INSTRUCTIONS
Discussed the causes of bacterial vaginosis, including bubble baths, douche, intercourse and oral sex, scented soaps, thong underwear, or anything else that can disrupt the normal vaginal griselda such as an antibiotic.   Eat more yogurt with active cultures a

## 2018-09-18 NOTE — PROGRESS NOTES
Patient ID:   Dayna Adams  is a 80year old female    Allergies    Penicillins               Medications     Citalopram Hydrobromide 20 MG Oral Tab 20 mg every other day with 10 mg on the opposite days Disp:  Rfl:    ibuprofen 400 MG Oral Tab Take 4 (Tympanic)   Ht 66.75\"   Wt 156 lb   SpO2 100%   BMI 24.62 kg/m²   Constitutional: well-developed and well-nourished. No distress.    Cardiovascular: Regular rate and rhythm no murmur  Pulmonary: Lungs are clear to auscultation bilaterally   Abdominal: Sof

## 2018-09-19 ENCOUNTER — TELEPHONE (OUTPATIENT)
Dept: FAMILY MEDICINE CLINIC | Facility: CLINIC | Age: 68
End: 2018-09-19

## 2018-09-19 NOTE — TELEPHONE ENCOUNTER
Pt informed,   Pt asked if her partner needs to get checked. Pt informed that if partner is having problems or symptoms, then yes appt would be advised.

## 2018-09-19 NOTE — TELEPHONE ENCOUNTER
----- Message from MARIANO Aldana sent at 9/19/2018  2:34 PM CDT -----  Please notify patient that technically her vaginitis swab came back negative for yeast, bacterial vaginosis, trichomonas.   However, because of her symptoms–she may have a differe

## 2018-09-19 NOTE — TELEPHONE ENCOUNTER
patient received call from [facility] about her rx but wants to know what the results were for the test she had yesterday- wants to know dx and whether or not her partner should be treated

## 2018-09-19 NOTE — TELEPHONE ENCOUNTER
I am still waiting for the results of vaginitis swab–we will notify her when we have results to decide on treatment.

## 2018-10-01 ENCOUNTER — OFFICE VISIT (OUTPATIENT)
Dept: FAMILY MEDICINE CLINIC | Facility: CLINIC | Age: 68
End: 2018-10-01
Payer: MEDICARE

## 2018-10-01 VITALS
BODY MASS INDEX: 25 KG/M2 | TEMPERATURE: 98 F | SYSTOLIC BLOOD PRESSURE: 110 MMHG | DIASTOLIC BLOOD PRESSURE: 76 MMHG | WEIGHT: 161.38 LBS | OXYGEN SATURATION: 99 % | HEART RATE: 73 BPM | RESPIRATION RATE: 16 BRPM

## 2018-10-01 DIAGNOSIS — J02.9 SORE THROAT: Primary | ICD-10-CM

## 2018-10-01 DIAGNOSIS — J06.9 VIRAL UPPER RESPIRATORY TRACT INFECTION: ICD-10-CM

## 2018-10-01 PROCEDURE — 99214 OFFICE O/P EST MOD 30 MIN: CPT | Performed by: NURSE PRACTITIONER

## 2018-10-01 PROCEDURE — 87880 STREP A ASSAY W/OPTIC: CPT | Performed by: NURSE PRACTITIONER

## 2018-10-01 PROCEDURE — 87081 CULTURE SCREEN ONLY: CPT | Performed by: NURSE PRACTITIONER

## 2018-10-01 RX ORDER — CEPHALEXIN 500 MG/1
500 CAPSULE ORAL 3 TIMES DAILY
Qty: 30 CAPSULE | Refills: 0 | Status: SHIPPED | OUTPATIENT
Start: 2018-10-01 | End: 2018-10-11

## 2018-10-01 NOTE — PATIENT INSTRUCTIONS
Rapid strep negative, culture pending. Treat symptoms as needed. If symptoms worsen or not getting better, fill the prescription. If prescription filled, take till gone and call office and prescription was filled.

## 2018-10-01 NOTE — PROGRESS NOTES
HPI:    Patient ID: Damari Linn is a 80year old female. HPI    Has a really bad ST. Was exposed to nephew who has strep. Started on Friday (3 days ago). Is getting worse.   + PND - clear drainage. Not really coughing. GI ok.    Feels exhaust She appears well-developed and well-nourished. No distress. HENT:   Head: Normocephalic and atraumatic.    Right Ear: Hearing, tympanic membrane, external ear and ear canal normal.   Left Ear: Hearing, tympanic membrane, external ear and ear canal normal. BT#[id number]

## 2018-10-02 ENCOUNTER — TELEPHONE (OUTPATIENT)
Dept: FAMILY MEDICINE CLINIC | Facility: CLINIC | Age: 68
End: 2018-10-02

## 2018-10-02 NOTE — TELEPHONE ENCOUNTER
Saw patient yesterday, told her to call if not feeling any better, still running a fever, feels no better

## 2018-10-02 NOTE — TELEPHONE ENCOUNTER
Patient states she is still not feeling well. Fever last night was 101 and this morning it was 101.1. Patient states she is taking Tylenol for the fever. Patient still c/o of the sore throat still and now has head congestion and a runny nose.   Please ad

## 2018-10-22 ENCOUNTER — OFFICE VISIT (OUTPATIENT)
Dept: FAMILY MEDICINE CLINIC | Facility: CLINIC | Age: 68
End: 2018-10-22
Payer: MEDICARE

## 2018-10-22 ENCOUNTER — LAB ENCOUNTER (OUTPATIENT)
Dept: LAB | Age: 68
End: 2018-10-22
Attending: FAMILY MEDICINE
Payer: MEDICARE

## 2018-10-22 VITALS
WEIGHT: 162.81 LBS | RESPIRATION RATE: 18 BRPM | DIASTOLIC BLOOD PRESSURE: 78 MMHG | TEMPERATURE: 99 F | HEIGHT: 66.75 IN | SYSTOLIC BLOOD PRESSURE: 110 MMHG | HEART RATE: 80 BPM | BODY MASS INDEX: 25.55 KG/M2

## 2018-10-22 DIAGNOSIS — K57.92 ACUTE DIVERTICULITIS: ICD-10-CM

## 2018-10-22 DIAGNOSIS — R10.32 LEFT LOWER QUADRANT PAIN: Primary | ICD-10-CM

## 2018-10-22 DIAGNOSIS — N39.0 URINARY TRACT INFECTION WITHOUT HEMATURIA, SITE UNSPECIFIED: ICD-10-CM

## 2018-10-22 DIAGNOSIS — R10.32 LEFT LOWER QUADRANT PAIN: ICD-10-CM

## 2018-10-22 PROCEDURE — 36415 COLL VENOUS BLD VENIPUNCTURE: CPT

## 2018-10-22 PROCEDURE — 80048 BASIC METABOLIC PNL TOTAL CA: CPT

## 2018-10-22 PROCEDURE — 85025 COMPLETE CBC W/AUTO DIFF WBC: CPT

## 2018-10-22 PROCEDURE — 99214 OFFICE O/P EST MOD 30 MIN: CPT | Performed by: FAMILY MEDICINE

## 2018-10-22 PROCEDURE — 86580 TB INTRADERMAL TEST: CPT | Performed by: FAMILY MEDICINE

## 2018-10-22 RX ORDER — CIPROFLOXACIN 500 MG/1
500 TABLET, FILM COATED ORAL 2 TIMES DAILY
Qty: 20 TABLET | Refills: 0 | Status: SHIPPED | OUTPATIENT
Start: 2018-10-22 | End: 2019-01-03 | Stop reason: ALTCHOICE

## 2018-10-22 RX ORDER — NITROFURANTOIN 25; 75 MG/1; MG/1
100 CAPSULE ORAL 2 TIMES DAILY
COMMUNITY
End: 2018-10-29 | Stop reason: ALTCHOICE

## 2018-10-22 RX ORDER — METRONIDAZOLE 500 MG/1
500 TABLET ORAL 3 TIMES DAILY
Qty: 30 TABLET | Refills: 0 | Status: SHIPPED | OUTPATIENT
Start: 2018-10-22 | End: 2018-10-29 | Stop reason: ALTCHOICE

## 2018-10-22 NOTE — PROGRESS NOTES
[facility] [place]  PROGRESS NOTE  Chief Complaint:   Patient presents with:  Abdominal Pain: abdominal cramping for 3 weeks      HPI:   This is a 80year old female coming in for abdominal pain for the past 3 weeks intermittent which comes and by mouth 2 (two) times daily. Disp:  Rfl:    Ciprofloxacin HCl 500 MG Oral Tab Take 1 tablet (500 mg total) by mouth 2 (two) times daily. Disp: 20 tablet Rfl: 0   metRONIDAZOLE 500 MG Oral Tab Take 1 tablet (500 mg total) by mouth 3 (three) times daily.  Alok Singh extremities,change in bowel or bladder control. HEMATOLOGIC:  Denies anemia, bleeding or bruising. LYMPHATICS:  Denies enlarged nodes   PSYCHIATRIC:  Denies depression or anxiety.       EXAM:   /78 (BP Location: Left arm, Patient Position: Sitting, suprapubic tenderness. Presently taking Macrobid for UTI. Discussed with patient: We will stop Macrobid. Begin Cipro and Flagyl. CBC and BMP. If no improvement then CT scan. Had colonoscopy 6 months ago.     Health Maintenance:  DEXA Scan due on 04/12

## 2018-10-22 NOTE — PATIENT INSTRUCTIONS
Stop Macrodantin. Begin Cipro and metronidazole. Labs drawn today. If no improvement then need CT scan. Will recheck in 1 week.

## 2018-10-24 ENCOUNTER — TELEPHONE (OUTPATIENT)
Dept: FAMILY MEDICINE CLINIC | Facility: CLINIC | Age: 68
End: 2018-10-24

## 2018-10-24 ENCOUNTER — APPOINTMENT (OUTPATIENT)
Dept: FAMILY MEDICINE CLINIC | Facility: CLINIC | Age: 68
End: 2018-10-24
Payer: MEDICARE

## 2018-10-24 DIAGNOSIS — R10.32 ABDOMINAL PAIN, LLQ (LEFT LOWER QUADRANT): Primary | ICD-10-CM

## 2018-10-24 NOTE — TELEPHONE ENCOUNTER
Tell patient that with her having a fever, I am concerned and would like her to proceed with a CT scan of her abdomen for the left lower quadrant pain. CT scan ordered.

## 2018-10-24 NOTE — TELEPHONE ENCOUNTER
----- Message from Jaky Mcgregor MD sent at 10/24/2018  6:37 AM CDT -----  Labs are good with white blood count being normal.  I would like her to make an appointment next week for recheck.   Please notify patient

## 2018-10-24 NOTE — TELEPHONE ENCOUNTER
Informed pt of her blood work results. Pt states she had a 100.0 fever yesterday and stabbing pains in her side. Pt will call back to schedule appt next week.

## 2018-10-25 ENCOUNTER — TELEPHONE (OUTPATIENT)
Dept: FAMILY MEDICINE CLINIC | Facility: CLINIC | Age: 68
End: 2018-10-25

## 2018-10-25 NOTE — TELEPHONE ENCOUNTER
Informed pt of the recommendations. Pt will continue to take both abx. Pt expressed understanding and thanks.

## 2018-10-25 NOTE — TELEPHONE ENCOUNTER
Approval has been received and pt was informed to schedule appt. Pt expressed understanding and thanks.

## 2018-10-25 NOTE — TELEPHONE ENCOUNTER
Patient is presently taking Cipro and Flagyl for possible diverticulitis. The UTI infection is covered by Cipro. Continue with taking the Cipro and Flagyl.   Please notify patient

## 2018-10-25 NOTE — TELEPHONE ENCOUNTER
Pt came to the office to drop off the C&S for her urine (in your folder)    Is pt on the correct abx. Please advise.

## 2018-10-26 ENCOUNTER — TELEPHONE (OUTPATIENT)
Dept: FAMILY MEDICINE CLINIC | Facility: CLINIC | Age: 68
End: 2018-10-26

## 2018-10-26 DIAGNOSIS — R10.32 ABDOMINAL PAIN, LEFT LOWER QUADRANT: Primary | ICD-10-CM

## 2018-10-29 ENCOUNTER — OFFICE VISIT (OUTPATIENT)
Dept: FAMILY MEDICINE CLINIC | Facility: CLINIC | Age: 68
End: 2018-10-29
Payer: MEDICARE

## 2018-10-29 VITALS
HEIGHT: 66 IN | WEIGHT: 161.25 LBS | TEMPERATURE: 98 F | HEART RATE: 82 BPM | DIASTOLIC BLOOD PRESSURE: 62 MMHG | RESPIRATION RATE: 16 BRPM | BODY MASS INDEX: 25.91 KG/M2 | SYSTOLIC BLOOD PRESSURE: 112 MMHG

## 2018-10-29 DIAGNOSIS — R10.32 ABDOMINAL PAIN, LEFT LOWER QUADRANT: Primary | ICD-10-CM

## 2018-10-29 PROCEDURE — 99213 OFFICE O/P EST LOW 20 MIN: CPT | Performed by: FAMILY MEDICINE

## 2018-10-29 NOTE — PROGRESS NOTES
[address]  PROGRESS NOTE  Chief Complaint:   Patient presents with: Follow - Up: CT scan       HPI:   This is a 80year old female coming in for recheck of abdominal pain.   Patient was last week started on Flagyl and Cipro for presumed Lymphocyte % 22.7 %    Monocyte % 4.9 %    Eosinophil % 1.0 %    Basophil % 0.2 %    Immature Granulocyte % 0.2 %       Past Medical History:   Diagnosis Date   • Depression    • Endometriosis    • History of malaria    • Meningioma (HCC)    • Microscopic SYSTEMS:   Denies eye, ear, nose, throat, cardiovascular, respiratory, gastrointestinal, genitourinary, musculoskeletal, skin, or neurological sxs other than those described in the HPI  .      EXAM:   /62 (BP Location: Left arm, Patient Position: Sitt allergies, or worsening or changing symptoms. Patient is to call with any side effects or complications from the treatments as a result of today.      Problem List:  Patient Active Problem List:     Familial multiple lipoprotein-type hyperlipidemia     Fredy Eng

## 2018-11-07 RX ORDER — CITALOPRAM 20 MG/1
TABLET ORAL
Qty: 90 TABLET | Refills: 0 | Status: SHIPPED | OUTPATIENT
Start: 2018-11-07 | End: 2018-12-13

## 2018-11-07 NOTE — TELEPHONE ENCOUNTER
Future appt:    Last Appointment:  10/29/2018 with PCP Dr. Dali Vilal    Cholesterol, Total (mg/dL)   Date Value   08/17/2018 197     HDL Cholesterol (mg/dL)   Date Value   08/17/2018 74 (H)     LDL Cholesterol (mg/dL)   Date Value   08/17/2018 108 (H)     T

## 2018-12-13 ENCOUNTER — TELEPHONE (OUTPATIENT)
Dept: FAMILY MEDICINE CLINIC | Facility: CLINIC | Age: 68
End: 2018-12-13

## 2018-12-13 RX ORDER — CITALOPRAM 10 MG/1
TABLET ORAL
Qty: 45 TABLET | Refills: 0 | Status: SHIPPED | OUTPATIENT
Start: 2018-12-13 | End: 2019-03-12

## 2018-12-13 NOTE — TELEPHONE ENCOUNTER
Patient is requesting a smaller pill for Citalopram, states she can't cut the pill.  Patient requested a call back

## 2018-12-13 NOTE — TELEPHONE ENCOUNTER
Citalopram does not come in 5 mg. It does come in 10 mg tablet which the patient can cut in half.   Please notify patient prescription sent to [facility]

## 2019-01-03 ENCOUNTER — OFFICE VISIT (OUTPATIENT)
Dept: FAMILY MEDICINE CLINIC | Facility: CLINIC | Age: 69
End: 2019-01-03
Payer: MEDICARE

## 2019-01-03 ENCOUNTER — APPOINTMENT (OUTPATIENT)
Dept: LAB | Age: 69
End: 2019-01-03
Attending: FAMILY MEDICINE
Payer: MEDICARE

## 2019-01-03 VITALS
HEIGHT: 66 IN | SYSTOLIC BLOOD PRESSURE: 108 MMHG | TEMPERATURE: 98 F | RESPIRATION RATE: 16 BRPM | HEART RATE: 80 BPM | BODY MASS INDEX: 23.49 KG/M2 | DIASTOLIC BLOOD PRESSURE: 74 MMHG | WEIGHT: 146.19 LBS

## 2019-01-03 DIAGNOSIS — R19.7 DIARRHEA, UNSPECIFIED TYPE: Primary | ICD-10-CM

## 2019-01-03 LAB
ALBUMIN SERPL-MCNC: 4.2 G/DL (ref 3.1–4.5)
ALBUMIN/GLOB SERPL: 1.1 {RATIO} (ref 1–2)
ALP LIVER SERPL-CCNC: 54 U/L (ref 55–142)
ALT SERPL-CCNC: 18 U/L (ref 14–54)
ANION GAP SERPL CALC-SCNC: 8 MMOL/L (ref 0–18)
AST SERPL-CCNC: 24 U/L (ref 15–41)
BILIRUB SERPL-MCNC: 0.5 MG/DL (ref 0.1–2)
BUN BLD-MCNC: 9 MG/DL (ref 8–20)
BUN/CREAT SERPL: 7.6 (ref 10–20)
CALCIUM BLD-MCNC: 10.1 MG/DL (ref 8.3–10.3)
CHLORIDE SERPL-SCNC: 102 MMOL/L (ref 101–111)
CO2 SERPL-SCNC: 26 MMOL/L (ref 22–32)
CREAT BLD-MCNC: 1.18 MG/DL (ref 0.55–1.02)
GLOBULIN PLAS-MCNC: 3.8 G/DL (ref 2.8–4.4)
GLUCOSE BLD-MCNC: 84 MG/DL (ref 70–99)
M PROTEIN MFR SERPL ELPH: 8 G/DL (ref 6.4–8.2)
OSMOLALITY SERPL CALC.SUM OF ELEC: 280 MOSM/KG (ref 275–295)
POTASSIUM SERPL-SCNC: 4.2 MMOL/L (ref 3.6–5.1)
SODIUM SERPL-SCNC: 136 MMOL/L (ref 136–144)

## 2019-01-03 PROCEDURE — 99214 OFFICE O/P EST MOD 30 MIN: CPT | Performed by: FAMILY MEDICINE

## 2019-01-03 PROCEDURE — 36415 COLL VENOUS BLD VENIPUNCTURE: CPT | Performed by: FAMILY MEDICINE

## 2019-01-03 PROCEDURE — 80053 COMPREHEN METABOLIC PANEL: CPT | Performed by: FAMILY MEDICINE

## 2019-01-03 NOTE — PROGRESS NOTES
[facility] [place]  PROGRESS NOTE  Chief Complaint:   Patient presents with:  Abdominal Pain: diahrrhea since Sat      HPI:   This is a 80year old female presents to clinic complaining of diarrhea that has been going on for past 5 days now.   Pa 10 MG Oral Tab Take 1 tablet by mouth as needed. Disp:  Rfl:    Estradiol 0.1 MG/GM Vaginal Cream Apply pea-sized amount to vaginal opening as needed for discomfort. Disp: 1 Tube Rfl: 0   Ospemifene (OSPHENA) 60 MG Oral Tab Take by mouth daily.  Disp:  Rfl: nontender, bowel sounds normal in all 4 quadrants, no masses, no hepatosplenomegaly. SKIN: No rashes, no skin lesion, no bruising, good turgor. LYMPHATIC: No cervical lymphadenopathy, no other lymphadenopathy.   MUSCULOSKELETAL: normal ROM, No joint pain,

## 2019-01-03 NOTE — PATIENT INSTRUCTIONS
Recommend plenty of fluids with gatorade. Check labs today. BRAT diet (Bananas, Rice, Applesauce, Toast)  Return to clinic or go to ER if increase in abdominal pain, fever or any blood in stool. Return to clinic in 1-2 weeks if no improvement.  Sooner

## 2019-01-04 ENCOUNTER — TELEPHONE (OUTPATIENT)
Dept: FAMILY MEDICINE CLINIC | Facility: CLINIC | Age: 69
End: 2019-01-04

## 2019-01-04 DIAGNOSIS — R19.7 DIARRHEA, UNSPECIFIED TYPE: ICD-10-CM

## 2019-01-04 NOTE — TELEPHONE ENCOUNTER
Re:   Not sure what to do - patient is unable to do her stool sample, due to not eating because she is sick -  Please advise

## 2019-01-08 ENCOUNTER — APPOINTMENT (OUTPATIENT)
Dept: LAB | Age: 69
End: 2019-01-08
Attending: FAMILY MEDICINE
Payer: MEDICARE

## 2019-01-08 PROCEDURE — 87493 C DIFF AMPLIFIED PROBE: CPT | Performed by: FAMILY MEDICINE

## 2019-01-08 PROCEDURE — 87045 FECES CULTURE AEROBIC BACT: CPT | Performed by: FAMILY MEDICINE

## 2019-01-08 PROCEDURE — 87427 SHIGA-LIKE TOXIN AG IA: CPT | Performed by: FAMILY MEDICINE

## 2019-01-08 PROCEDURE — 87046 STOOL CULTR AEROBIC BACT EA: CPT | Performed by: FAMILY MEDICINE

## 2019-01-09 ENCOUNTER — TELEPHONE (OUTPATIENT)
Dept: FAMILY MEDICINE CLINIC | Facility: CLINIC | Age: 69
End: 2019-01-09

## 2019-01-11 ENCOUNTER — TELEPHONE (OUTPATIENT)
Dept: FAMILY MEDICINE CLINIC | Facility: CLINIC | Age: 69
End: 2019-01-11

## 2019-01-11 NOTE — TELEPHONE ENCOUNTER
----- Message from Paula Mon MD sent at 1/11/2019 12:10 PM CST -----  Please inform patient that her stool studies are negative. Recommend to return to clinic if her diarrhea does not improve.

## 2019-01-14 NOTE — TELEPHONE ENCOUNTER
Dr. Summer Osorio is out of the office today. Needs appointment if still concerned and wants to be seen before her appointment with Dr. Carmella Lara.

## 2019-01-14 NOTE — TELEPHONE ENCOUNTER
Pt informed. States diarrhea has improved, however,  Pt is having stomach pain. Pt also having constipation. Pt also scheduled appt with Dr. Jese Drew 1/17/19.       Future Appointments   Date Time Provider Alysia Martinez   1/15/2019 10:20 AM Juan Rutledge

## 2019-01-15 ENCOUNTER — OFFICE VISIT (OUTPATIENT)
Dept: FAMILY MEDICINE CLINIC | Facility: CLINIC | Age: 69
End: 2019-01-15
Payer: MEDICARE

## 2019-01-15 VITALS
HEIGHT: 65.5 IN | SYSTOLIC BLOOD PRESSURE: 102 MMHG | DIASTOLIC BLOOD PRESSURE: 62 MMHG | OXYGEN SATURATION: 99 % | WEIGHT: 147 LBS | TEMPERATURE: 98 F | BODY MASS INDEX: 24.2 KG/M2 | HEART RATE: 67 BPM

## 2019-01-15 DIAGNOSIS — K59.00 CONSTIPATION, UNSPECIFIED CONSTIPATION TYPE: ICD-10-CM

## 2019-01-15 DIAGNOSIS — R14.0 ABDOMINAL BLOATING: Primary | ICD-10-CM

## 2019-01-15 LAB
BILIRUB UR QL STRIP.AUTO: NEGATIVE
CLARITY UR REFRACT.AUTO: CLEAR
GLUCOSE UR STRIP.AUTO-MCNC: NEGATIVE MG/DL
KETONES UR STRIP.AUTO-MCNC: NEGATIVE MG/DL
LEUKOCYTE ESTERASE UR QL STRIP.AUTO: NEGATIVE
NITRITE UR QL STRIP.AUTO: NEGATIVE
PH UR STRIP.AUTO: 6 [PH] (ref 4.5–8)
PROT UR STRIP.AUTO-MCNC: NEGATIVE MG/DL
SP GR UR STRIP.AUTO: 1.01 (ref 1–1.03)
UROBILINOGEN UR STRIP.AUTO-MCNC: <2 MG/DL

## 2019-01-15 PROCEDURE — 81001 URINALYSIS AUTO W/SCOPE: CPT | Performed by: FAMILY MEDICINE

## 2019-01-15 PROCEDURE — 99213 OFFICE O/P EST LOW 20 MIN: CPT | Performed by: FAMILY MEDICINE

## 2019-01-15 NOTE — PROGRESS NOTES
[facility] [place]  PROGRESS NOTE  Chief Complaint:   Patient presents with:  Change of Bowel Habits      HPI:   This is a 80year old female presents to clinic complaining of abdominal bloating sensation and constipation that has been going on Rizatriptan Benzoate 10 MG Oral Tab Take 1 tablet by mouth as needed. Disp:  Rfl:    Estradiol 0.1 MG/GM Vaginal Cream Apply pea-sized amount to vaginal opening as needed for discomfort.  Disp: 1 Tube Rfl: 0   Ospemifene (OSPHENA) 60 MG Oral Tab Take by m seen today for change of bowel habits.     Diagnoses and all orders for this visit:    Abdominal bloating  -     URINALYSIS WITH CULTURE REFLEX; Future  -     GASTRO - EXTERNAL    Constipation, unspecified constipation type  -     GASTRO - EXTERNAL      Pat

## 2019-01-15 NOTE — PATIENT INSTRUCTIONS
Recommend plenty of fluids and about 25 grams of fiber in diet. May use fiber supplement benefiber or metamucil. Also try stool softener Miralax. Increase walking and exercise. Continue with probiotics. See GI Dr Susu Huang if no improvement.       Re

## 2019-01-16 ENCOUNTER — TELEPHONE (OUTPATIENT)
Dept: FAMILY MEDICINE CLINIC | Facility: CLINIC | Age: 69
End: 2019-01-16

## 2019-01-16 NOTE — TELEPHONE ENCOUNTER
Please inform patient that her urine analysis is within normal limits. Recommend to return to clinic or see gastroenterologist if no improvement in her symptoms.

## 2019-02-11 RX ORDER — SOLIFENACIN SUCCINATE 5 MG/1
5 TABLET, FILM COATED ORAL DAILY
Qty: 90 TABLET | Refills: 1 | Status: SHIPPED | OUTPATIENT
Start: 2019-02-11 | End: 2019-07-13

## 2019-02-11 NOTE — TELEPHONE ENCOUNTER
Future appt:    Last Appointment:  1/15/2019  Cholesterol, Total (mg/dL)   Date Value   08/17/2018 197     HDL Cholesterol (mg/dL)   Date Value   08/17/2018 74 (H)     LDL Cholesterol (mg/dL)   Date Value   08/17/2018 108 (H)     Triglycerides (mg/dL)   Da

## 2019-02-26 ENCOUNTER — OFFICE VISIT (OUTPATIENT)
Dept: FAMILY MEDICINE CLINIC | Facility: CLINIC | Age: 69
End: 2019-02-26
Payer: MEDICARE

## 2019-02-26 ENCOUNTER — APPOINTMENT (OUTPATIENT)
Dept: LAB | Age: 69
End: 2019-02-26
Attending: NURSE PRACTITIONER
Payer: MEDICARE

## 2019-02-26 VITALS
SYSTOLIC BLOOD PRESSURE: 122 MMHG | BODY MASS INDEX: 25.22 KG/M2 | DIASTOLIC BLOOD PRESSURE: 62 MMHG | RESPIRATION RATE: 16 BRPM | HEIGHT: 65.5 IN | HEART RATE: 58 BPM | TEMPERATURE: 97 F | WEIGHT: 153.25 LBS

## 2019-02-26 DIAGNOSIS — R53.83 FATIGUE, UNSPECIFIED TYPE: ICD-10-CM

## 2019-02-26 DIAGNOSIS — J01.20 ACUTE NON-RECURRENT ETHMOIDAL SINUSITIS: Primary | ICD-10-CM

## 2019-02-26 DIAGNOSIS — J02.9 SORE THROAT: ICD-10-CM

## 2019-02-26 LAB
ALBUMIN SERPL-MCNC: 3.9 G/DL (ref 3.4–5)
ALBUMIN/GLOB SERPL: 1 {RATIO} (ref 1–2)
ALP LIVER SERPL-CCNC: 75 U/L (ref 55–142)
ALT SERPL-CCNC: 21 U/L (ref 13–56)
ANION GAP SERPL CALC-SCNC: 8 MMOL/L (ref 0–18)
AST SERPL-CCNC: 21 U/L (ref 15–37)
BASOPHILS # BLD AUTO: 0.03 X10(3) UL (ref 0–0.2)
BASOPHILS NFR BLD AUTO: 0.4 %
BILIRUB SERPL-MCNC: 0.4 MG/DL (ref 0.1–2)
BUN BLD-MCNC: 14 MG/DL (ref 7–18)
BUN/CREAT SERPL: 15.9 (ref 10–20)
CALCIUM BLD-MCNC: 9 MG/DL (ref 8.5–10.1)
CHLORIDE SERPL-SCNC: 104 MMOL/L (ref 98–107)
CO2 SERPL-SCNC: 27 MMOL/L (ref 21–32)
CONTROL LINE PRESENT WITH A CLEAR BACKGROUND (YES/NO): YES YES/NO
CREAT BLD-MCNC: 0.88 MG/DL (ref 0.55–1.02)
DEPRECATED RDW RBC AUTO: 46.4 FL (ref 35.1–46.3)
EOSINOPHIL # BLD AUTO: 0.1 X10(3) UL (ref 0–0.7)
EOSINOPHIL NFR BLD AUTO: 1.2 %
ERYTHROCYTE [DISTWIDTH] IN BLOOD BY AUTOMATED COUNT: 13.2 % (ref 11–15)
GLOBULIN PLAS-MCNC: 3.8 G/DL (ref 2.8–4.4)
GLUCOSE BLD-MCNC: 80 MG/DL (ref 70–99)
HCT VFR BLD AUTO: 36.4 % (ref 35–48)
HGB BLD-MCNC: 12.2 G/DL (ref 12–16)
IMM GRANULOCYTES # BLD AUTO: 0.02 X10(3) UL (ref 0–1)
IMM GRANULOCYTES NFR BLD: 0.2 %
LYMPHOCYTES # BLD AUTO: 1.57 X10(3) UL (ref 1–4)
LYMPHOCYTES NFR BLD AUTO: 19.5 %
M PROTEIN MFR SERPL ELPH: 7.7 G/DL (ref 6.4–8.2)
MCH RBC QN AUTO: 31.9 PG (ref 26–34)
MCHC RBC AUTO-ENTMCNC: 33.5 G/DL (ref 31–37)
MCV RBC AUTO: 95.3 FL (ref 80–100)
MONOCYTES # BLD AUTO: 0.54 X10(3) UL (ref 0.1–1)
MONOCYTES NFR BLD AUTO: 6.7 %
NEUTROPHILS # BLD AUTO: 5.8 X10 (3) UL (ref 1.5–7.7)
NEUTROPHILS # BLD AUTO: 5.8 X10(3) UL (ref 1.5–7.7)
NEUTROPHILS NFR BLD AUTO: 72 %
OSMOLALITY SERPL CALC.SUM OF ELEC: 287 MOSM/KG (ref 275–295)
PLATELET # BLD AUTO: 317 10(3)UL (ref 150–450)
POTASSIUM SERPL-SCNC: 4.5 MMOL/L (ref 3.5–5.1)
RBC # BLD AUTO: 3.82 X10(6)UL (ref 3.8–5.3)
SODIUM SERPL-SCNC: 139 MMOL/L (ref 136–145)
STREP GRP A CUL-SCR: NEGATIVE
WBC # BLD AUTO: 8.1 X10(3) UL (ref 4–11)

## 2019-02-26 PROCEDURE — 86644 CMV ANTIBODY: CPT | Performed by: NURSE PRACTITIONER

## 2019-02-26 PROCEDURE — 80053 COMPREHEN METABOLIC PANEL: CPT | Performed by: NURSE PRACTITIONER

## 2019-02-26 PROCEDURE — 87081 CULTURE SCREEN ONLY: CPT | Performed by: NURSE PRACTITIONER

## 2019-02-26 PROCEDURE — 85025 COMPLETE CBC W/AUTO DIFF WBC: CPT | Performed by: NURSE PRACTITIONER

## 2019-02-26 PROCEDURE — 87880 STREP A ASSAY W/OPTIC: CPT | Performed by: NURSE PRACTITIONER

## 2019-02-26 PROCEDURE — 36415 COLL VENOUS BLD VENIPUNCTURE: CPT | Performed by: NURSE PRACTITIONER

## 2019-02-26 PROCEDURE — 86664 EPSTEIN-BARR NUCLEAR ANTIGEN: CPT | Performed by: NURSE PRACTITIONER

## 2019-02-26 PROCEDURE — 86665 EPSTEIN-BARR CAPSID VCA: CPT | Performed by: NURSE PRACTITIONER

## 2019-02-26 PROCEDURE — 99214 OFFICE O/P EST MOD 30 MIN: CPT | Performed by: NURSE PRACTITIONER

## 2019-02-26 PROCEDURE — 86645 CMV ANTIBODY IGM: CPT | Performed by: NURSE PRACTITIONER

## 2019-02-26 RX ORDER — SULFAMETHOXAZOLE AND TRIMETHOPRIM 800; 160 MG/1; MG/1
1 TABLET ORAL 2 TIMES DAILY
Qty: 20 TABLET | Refills: 0 | Status: SHIPPED | OUTPATIENT
Start: 2019-02-26 | End: 2019-03-08

## 2019-02-26 NOTE — PROGRESS NOTES
CHIEF COMPLAINT:   Patient presents with:  Nausea: Since December - was overseas in [facility]  Sore Throat: Since December - was overseas in [facility]  Diarrhea: New onset      HPI:   Ju Soto is a 80year old female who presents to clinic t [facility])    • Microscopic hematuria    • Ovarian cyst    • Pineal gland cyst       Social History:  Social History    Tobacco Use      Smoking status: Never Smoker      Smokeless tobacco: Never Used    Alcohol use:  Yes      Alcohol/week: 0.0 oz gargles ,bharathi pot (use distilled water) or saline nasal spray, sudafed (behind the counter)  In the morning. Flonase 2 sprays each nostril once a day, Tylenol/Ibuprofen, follow up if symptoms persist or increase.         Patient voiced understanding and i

## 2019-02-26 NOTE — PATIENT INSTRUCTIONS
Rest, increase fluids, salt water gargles ,bharathi pot (use distilled water) or saline nasal spray, sudafed (behind the counter)  In the morning. Flonase 2 sprays each nostril once a day, Tylenol/Ibuprofen, follow up if symptoms persist or increase.

## 2019-02-27 ENCOUNTER — TELEPHONE (OUTPATIENT)
Dept: FAMILY MEDICINE CLINIC | Facility: CLINIC | Age: 69
End: 2019-02-27

## 2019-02-27 LAB
CMV IGG SERPL QL: NEGATIVE
CMV IGM SERPL QL: NEGATIVE
EBV NA IGG SER QL IA: POSITIVE
EBV VCA IGG SER QL IA: POSITIVE
EBV VCA IGM SER QL IA: NEGATIVE

## 2019-02-27 NOTE — TELEPHONE ENCOUNTER
----- Message from CHANNING Britton sent at 2/27/2019  4:42 PM CST -----  My chart message sent please make sure that patient received message-  Your mono testing shows that you are negative for cytomegalovirus (no current or past infection).   And you

## 2019-02-28 ENCOUNTER — OFFICE VISIT (OUTPATIENT)
Dept: FAMILY MEDICINE CLINIC | Facility: CLINIC | Age: 69
End: 2019-02-28
Payer: MEDICARE

## 2019-02-28 VITALS
DIASTOLIC BLOOD PRESSURE: 70 MMHG | HEART RATE: 84 BPM | HEIGHT: 65.5 IN | SYSTOLIC BLOOD PRESSURE: 122 MMHG | TEMPERATURE: 98 F | WEIGHT: 154 LBS | BODY MASS INDEX: 25.35 KG/M2

## 2019-02-28 DIAGNOSIS — K14.6 TONGUE SORE: Primary | ICD-10-CM

## 2019-02-28 PROCEDURE — 99214 OFFICE O/P EST MOD 30 MIN: CPT | Performed by: NURSE PRACTITIONER

## 2019-02-28 RX ORDER — VALACYCLOVIR HYDROCHLORIDE 1 G/1
1 TABLET, FILM COATED ORAL 3 TIMES DAILY
Qty: 21 TABLET | Refills: 0 | Status: SHIPPED | OUTPATIENT
Start: 2019-02-28 | End: 2019-03-07

## 2019-02-28 NOTE — PROGRESS NOTES
CHIEF COMPLAINT:   Patient presents with:  Canker Sores      HPI:   Alpesh Tamez is a 80year old female who presents to clinic today with complaints of sores on tongue are back-   States thishappend while she was in [place] -   Sore throat, cyst       Social History:  Social History    Tobacco Use      Smoking status: Never Smoker      Smokeless tobacco: Never Used    Alcohol use:  Yes      Alcohol/week: 0.0 oz      Comment: 1/ month    Drug use: No       REVIEW OF SYSTEMS:   GENERAL: See HPI Discussed with patient that it does not look like a herpetic infection, but we can try an antiviral to see if it will be helpful.       Meds & Refills for this Visit:  Requested Prescriptions     Signed Prescriptions Disp Refills   • valACYclovir HCl 1

## 2019-02-28 NOTE — PATIENT INSTRUCTIONS
Rest, increase fluids, salt water gargles ,neti pot (use distilled water) or saline nasal spray, Add Alavert or Claritin, follow up if symptoms persist or increase.

## 2019-03-12 RX ORDER — CITALOPRAM 10 MG/1
TABLET ORAL
Qty: 45 TABLET | Refills: 0 | Status: SHIPPED | OUTPATIENT
Start: 2019-03-12 | End: 2019-05-16

## 2019-03-22 ENCOUNTER — TELEPHONE (OUTPATIENT)
Dept: FAMILY MEDICINE CLINIC | Facility: CLINIC | Age: 69
End: 2019-03-22

## 2019-03-22 NOTE — TELEPHONE ENCOUNTER
Patient is calling stating that she has been having stomach pain/discomfort and nausea since [place]. Patient states that her dad has hypercalcemia and she has a lot of the same symptoms that he has.      Patient states that she is becoming almost dep

## 2019-03-22 NOTE — TELEPHONE ENCOUNTER
Labs done on 2/26/19 shows calcium of 9.0, which is normal.   Recommend bland diet for next couple days. May try OTC tums or zantac. If no improvement by next week then recommend appt for evaluation.

## 2019-03-22 NOTE — TELEPHONE ENCOUNTER
Would like to see , but nothing available, been having stomach issues since [place].   Can you please give her a call

## 2019-04-09 ENCOUNTER — TELEPHONE (OUTPATIENT)
Dept: FAMILY MEDICINE CLINIC | Facility: CLINIC | Age: 69
End: 2019-04-09

## 2019-04-09 RX ORDER — RIZATRIPTAN BENZOATE 10 MG/1
10 TABLET ORAL AS NEEDED
Qty: 30 TABLET | Refills: 0 | Status: SHIPPED | OUTPATIENT
Start: 2019-04-09 | End: 2020-07-15

## 2019-04-09 NOTE — TELEPHONE ENCOUNTER
Future appt:    Last Appointment:  1/15/2019; No f/u recommended    Cholesterol, Total (mg/dL)   Date Value   08/17/2018 197     HDL Cholesterol (mg/dL)   Date Value   08/17/2018 74 (H)     LDL Cholesterol (mg/dL)   Date Value   08/17/2018 108 (H)     Trig

## 2019-04-24 ENCOUNTER — TELEPHONE (OUTPATIENT)
Dept: FAMILY MEDICINE CLINIC | Facility: CLINIC | Age: 69
End: 2019-04-24

## 2019-04-24 NOTE — TELEPHONE ENCOUNTER
Called patient to set up appt to review labs. Patient stated that she was just going to call and set one up because Dr Charlene Root stated that she had possible anemia and she had been having heart palpitations at night. Patient set up appt for next week.  That w

## 2019-05-01 ENCOUNTER — HOSPITAL ENCOUNTER (OUTPATIENT)
Dept: GENERAL RADIOLOGY | Age: 69
Discharge: HOME | End: 2019-05-01
Attending: NURSE PRACTITIONER
Payer: MEDICARE

## 2019-05-01 ENCOUNTER — OFFICE VISIT (OUTPATIENT)
Dept: FAMILY MEDICINE CLINIC | Facility: CLINIC | Age: 69
End: 2019-05-01
Payer: MEDICARE

## 2019-05-01 VITALS
TEMPERATURE: 99 F | WEIGHT: 157.63 LBS | DIASTOLIC BLOOD PRESSURE: 70 MMHG | OXYGEN SATURATION: 99 % | HEART RATE: 86 BPM | RESPIRATION RATE: 18 BRPM | SYSTOLIC BLOOD PRESSURE: 114 MMHG | BODY MASS INDEX: 25.95 KG/M2 | HEIGHT: 65.5 IN

## 2019-05-01 DIAGNOSIS — R06.2 WHEEZING: ICD-10-CM

## 2019-05-01 DIAGNOSIS — R05.9 COUGH: Primary | ICD-10-CM

## 2019-05-01 DIAGNOSIS — R68.89 INFLUENZA-LIKE SYMPTOMS: ICD-10-CM

## 2019-05-01 DIAGNOSIS — R05.9 COUGH: ICD-10-CM

## 2019-05-01 PROCEDURE — 87798 DETECT AGENT NOS DNA AMP: CPT | Performed by: NURSE PRACTITIONER

## 2019-05-01 PROCEDURE — 71046 X-RAY EXAM CHEST 2 VIEWS: CPT | Performed by: NURSE PRACTITIONER

## 2019-05-01 PROCEDURE — 87502 INFLUENZA DNA AMP PROBE: CPT | Performed by: NURSE PRACTITIONER

## 2019-05-01 PROCEDURE — 99214 OFFICE O/P EST MOD 30 MIN: CPT | Performed by: NURSE PRACTITIONER

## 2019-05-01 RX ORDER — OSELTAMIVIR PHOSPHATE 75 MG/1
75 CAPSULE ORAL 2 TIMES DAILY
Qty: 10 CAPSULE | Refills: 0 | Status: SHIPPED | OUTPATIENT
Start: 2019-05-01 | End: 2019-05-06

## 2019-05-01 RX ORDER — MELATONIN
6 DAILY
COMMUNITY

## 2019-05-01 RX ORDER — AZITHROMYCIN 250 MG/1
TABLET, FILM COATED ORAL
Qty: 6 TABLET | Refills: 0 | Status: SHIPPED | OUTPATIENT
Start: 2019-05-01 | End: 2019-05-09 | Stop reason: ALTCHOICE

## 2019-05-01 NOTE — PATIENT INSTRUCTIONS
Directed to take antibiotics until gone. Take Tamiflu twice a day   CXR - no pneumonia noted. Flu testing pending. Recommend to eat yogurt or take probiotic daily while on antibiotic, but not the same time as the antibiotic.   Treat symptoms as needed

## 2019-05-01 NOTE — PROGRESS NOTES
HPI:    Patient ID: Prince Bishop is a 77year old female. HPI     Present with concerns about possible flu symptoms. 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 Cough, ST, nasal congestion. Body aches.    The day b melatonin 3 MG Oral Tab Take 6 mg by mouth daily.  Disp:  Rfl:      Allergies:  Penicillins                PHYSICAL EXAM:      05/01/19  0808   BP: 114/70   Pulse: 86   Resp: 18   Temp: 99.1 °F (37.3 °C)   SpO2: 99%   Weight: 157 lb 9.6 oz   Height: 65.5\ Oral Tab 6 tablet 0     Sig: Take two tablets by mouth today, then one tablet daily. Imaging & Referrals:  CXR: no acute findings, Pending radiology report       Patient Instructions   Directed to take antibiotics until gone.   Take Tamiflu twice a da

## 2019-05-09 ENCOUNTER — APPOINTMENT (OUTPATIENT)
Dept: LAB | Age: 69
End: 2019-05-09
Attending: FAMILY MEDICINE
Payer: MEDICARE

## 2019-05-09 ENCOUNTER — OFFICE VISIT (OUTPATIENT)
Dept: FAMILY MEDICINE CLINIC | Facility: CLINIC | Age: 69
End: 2019-05-09
Payer: MEDICARE

## 2019-05-09 VITALS
HEART RATE: 76 BPM | BODY MASS INDEX: 25.07 KG/M2 | HEIGHT: 66 IN | DIASTOLIC BLOOD PRESSURE: 76 MMHG | WEIGHT: 156 LBS | RESPIRATION RATE: 16 BRPM | SYSTOLIC BLOOD PRESSURE: 110 MMHG | TEMPERATURE: 99 F | OXYGEN SATURATION: 99 %

## 2019-05-09 DIAGNOSIS — R00.2 PALPITATIONS: ICD-10-CM

## 2019-05-09 DIAGNOSIS — G47.00 INSOMNIA, UNSPECIFIED TYPE: ICD-10-CM

## 2019-05-09 DIAGNOSIS — R91.1 LUNG NODULE: ICD-10-CM

## 2019-05-09 DIAGNOSIS — D64.9 ANEMIA, UNSPECIFIED TYPE: Primary | ICD-10-CM

## 2019-05-09 PROCEDURE — 36415 COLL VENOUS BLD VENIPUNCTURE: CPT | Performed by: FAMILY MEDICINE

## 2019-05-09 PROCEDURE — 93000 ELECTROCARDIOGRAM COMPLETE: CPT | Performed by: FAMILY MEDICINE

## 2019-05-09 PROCEDURE — 84443 ASSAY THYROID STIM HORMONE: CPT | Performed by: FAMILY MEDICINE

## 2019-05-09 PROCEDURE — 83550 IRON BINDING TEST: CPT | Performed by: FAMILY MEDICINE

## 2019-05-09 PROCEDURE — 82746 ASSAY OF FOLIC ACID SERUM: CPT | Performed by: FAMILY MEDICINE

## 2019-05-09 PROCEDURE — 80053 COMPREHEN METABOLIC PANEL: CPT | Performed by: FAMILY MEDICINE

## 2019-05-09 PROCEDURE — 85025 COMPLETE CBC W/AUTO DIFF WBC: CPT | Performed by: FAMILY MEDICINE

## 2019-05-09 PROCEDURE — 83540 ASSAY OF IRON: CPT | Performed by: FAMILY MEDICINE

## 2019-05-09 PROCEDURE — 82607 VITAMIN B-12: CPT | Performed by: FAMILY MEDICINE

## 2019-05-09 PROCEDURE — 99214 OFFICE O/P EST MOD 30 MIN: CPT | Performed by: FAMILY MEDICINE

## 2019-05-09 PROCEDURE — 82728 ASSAY OF FERRITIN: CPT | Performed by: FAMILY MEDICINE

## 2019-05-09 RX ORDER — ZOLPIDEM TARTRATE 5 MG/1
5 TABLET ORAL AS NEEDED
Qty: 30 TABLET | Refills: 0 | Status: SHIPPED | OUTPATIENT
Start: 2019-05-09 | End: 2020-07-15

## 2019-05-09 RX ORDER — OMEPRAZOLE 20 MG/1
20 CAPSULE, DELAYED RELEASE ORAL DAILY
COMMUNITY
Start: 2019-03-18 | End: 2019-06-16

## 2019-05-09 NOTE — PATIENT INSTRUCTIONS
EKG today. Schedule CT scan at [facility]   Schedule holter monitor. Check labs today. Go to ER if palpitation worse. Recheck in 1 week after CT scan.

## 2019-05-09 NOTE — PROGRESS NOTES
[facility] [place]  PROGRESS NOTE  Chief Complaint:   Patient presents with:  Lab Results  Palpitations: past month, lung nodule      HPI:   This is a 77year old female presents to clinic to discuss recent labs that were done with gastroenterol by mouth daily. Disp:  Rfl:    Zolpidem Tartrate (AMBIEN) 5 MG Oral Tab Take 1 tablet (5 mg total) by mouth as needed. Disp: 30 tablet Rfl: 0   Lactobacillus (PROBIOTIC ACIDOPHILUS OR) Take 1 tablet by mouth daily.  Disp:  Rfl:    melatonin 3 MG Oral Tab Ta intolerance, polyuria or polydipsia.       EXAM:   /76 (BP Location: Left arm, Patient Position: Sitting, Cuff Size: adult)   Pulse 76   Temp 98.9 °F (37.2 °C) (Tympanic)   Resp 16   Ht 66\"   Wt 156 lb   LMP 02/26/2002   SpO2 99%   BMI 25.18 kg/m²  E unspecified type    Other orders  -     Zolpidem Tartrate (AMBIEN) 5 MG Oral Tab; Take 1 tablet (5 mg total) by mouth as needed. EKG shows normal sinus rhythm  Patient Instructions   EKG today.    Schedule CT scan at [facility].   Schedule hol

## 2019-05-10 ENCOUNTER — TELEPHONE (OUTPATIENT)
Dept: FAMILY MEDICINE CLINIC | Facility: CLINIC | Age: 69
End: 2019-05-10

## 2019-05-10 NOTE — TELEPHONE ENCOUNTER
----- Message from Janis Goins MD sent at 5/10/2019  7:43 AM CDT -----  Please inform patient that her vitamin B12 level is very high, it is more than 2000, normal is less than 986. Recommend to stop B12 supplementation.   Elevated B12 level could cause

## 2019-05-13 ENCOUNTER — TELEPHONE (OUTPATIENT)
Dept: FAMILY MEDICINE CLINIC | Facility: CLINIC | Age: 69
End: 2019-05-13

## 2019-05-13 DIAGNOSIS — R00.2 PALPITATIONS: Primary | ICD-10-CM

## 2019-05-14 ENCOUNTER — TELEPHONE (OUTPATIENT)
Dept: FAMILY MEDICINE CLINIC | Facility: CLINIC | Age: 69
End: 2019-05-14

## 2019-05-14 NOTE — TELEPHONE ENCOUNTER
Please inform patient that CT scan shows bilateral stable nodules over past 8 years. This likely indicate benign process, no further CT scan recommended. Return to clinic if any concern.

## 2019-05-15 ENCOUNTER — HOSPITAL ENCOUNTER (OUTPATIENT)
Dept: CV DIAGNOSTICS | Age: 69
Discharge: HOME | End: 2019-05-15
Attending: FAMILY MEDICINE
Payer: MEDICARE

## 2019-05-15 DIAGNOSIS — R00.2 PALPITATIONS: ICD-10-CM

## 2019-05-15 PROCEDURE — 93225 XTRNL ECG REC<48 HRS REC: CPT | Performed by: FAMILY MEDICINE

## 2019-05-15 PROCEDURE — 93227 XTRNL ECG REC<48 HR R&I: CPT | Performed by: FAMILY MEDICINE

## 2019-05-15 NOTE — TELEPHONE ENCOUNTER
Patient came in today to get holter monitor and asked to speak to Dr Merlinda Beard nurse. Gave patient the results from the following below. Patient agreed and had no other questions at this time.

## 2019-05-16 RX ORDER — CITALOPRAM 10 MG/1
TABLET ORAL
Qty: 45 TABLET | Refills: 0 | Status: SHIPPED | OUTPATIENT
Start: 2019-05-16 | End: 2019-07-16

## 2019-05-16 NOTE — TELEPHONE ENCOUNTER
Future Appointments   Date Time Provider Alysia Martinez   5/16/2019 10:00 AM [facility] HOLTER [facility]      Return in about 1 week (around 5/16/2019) for follow up.

## 2019-05-17 ENCOUNTER — TELEPHONE (OUTPATIENT)
Dept: FAMILY MEDICINE CLINIC | Facility: CLINIC | Age: 69
End: 2019-05-17

## 2019-05-17 NOTE — TELEPHONE ENCOUNTER
After August 15, 2019. She will also be due for her Medicare annual exam at that time also, recommend to schedule appointment.

## 2019-05-22 ENCOUNTER — TELEPHONE (OUTPATIENT)
Dept: FAMILY MEDICINE CLINIC | Facility: CLINIC | Age: 69
End: 2019-05-22

## 2019-05-22 NOTE — TELEPHONE ENCOUNTER
----- Message from CHANNING Keen sent at 5/21/2019  3:38 PM CDT -----  Dr. Dionna Shipman is out of the office today.   Please let patient know that her Holter monitor has been read and is normal. Please have patient schedule appointment if symptoms persist

## 2019-06-22 ENCOUNTER — TELEPHONE (OUTPATIENT)
Dept: FAMILY MEDICINE CLINIC | Facility: CLINIC | Age: 69
End: 2019-06-22

## 2019-06-22 NOTE — TELEPHONE ENCOUNTER
Pt was scheduled for an appt this morning with Dr Rivers and was  a no show. I called pt and she stated she went to urgent care the night before and forgot to cancle this appt. I did mention the no show fee of $40, pt expressed understanding.

## 2019-07-08 ENCOUNTER — OFFICE VISIT (OUTPATIENT)
Dept: FAMILY MEDICINE CLINIC | Facility: CLINIC | Age: 69
End: 2019-07-08
Payer: MEDICARE

## 2019-07-08 VITALS
TEMPERATURE: 98 F | RESPIRATION RATE: 14 BRPM | OXYGEN SATURATION: 99 % | WEIGHT: 160 LBS | SYSTOLIC BLOOD PRESSURE: 110 MMHG | HEIGHT: 66 IN | DIASTOLIC BLOOD PRESSURE: 70 MMHG | BODY MASS INDEX: 25.71 KG/M2 | HEART RATE: 52 BPM

## 2019-07-08 DIAGNOSIS — R30.9 PAINFUL URINATION: Primary | ICD-10-CM

## 2019-07-08 DIAGNOSIS — N30.01 ACUTE CYSTITIS WITH HEMATURIA: ICD-10-CM

## 2019-07-08 PROBLEM — R30.0 DYSURIA: Status: ACTIVE | Noted: 2019-06-21

## 2019-07-08 LAB
APPEARANCE: CLEAR
BILIRUB UR QL STRIP.AUTO: NEGATIVE
BILIRUBIN: NEGATIVE
CLARITY UR REFRACT.AUTO: CLEAR
GLUCOSE (URINE DIPSTICK): NEGATIVE MG/DL
GLUCOSE UR STRIP.AUTO-MCNC: NEGATIVE MG/DL
KETONES (URINE DIPSTICK): NEGATIVE MG/DL
KETONES UR STRIP.AUTO-MCNC: NEGATIVE MG/DL
MULTISTIX LOT#: NORMAL NUMERIC
NITRITE UR QL STRIP.AUTO: NEGATIVE
NITRITE, URINE: NEGATIVE
PH UR STRIP.AUTO: 6 [PH] (ref 4.5–8)
PH, URINE: 6 (ref 4.5–8)
PROT UR STRIP.AUTO-MCNC: NEGATIVE MG/DL
PROTEIN (URINE DIPSTICK): NEGATIVE MG/DL
SP GR UR STRIP.AUTO: 1 (ref 1–1.03)
SPECIFIC GRAVITY: <=1.005 (ref 1–1.03)
URINE-COLOR: YELLOW
UROBILINOGEN UR STRIP.AUTO-MCNC: <2 MG/DL
UROBILINOGEN,SEMI-QN: 0.2 MG/DL (ref 0–1.9)
WBC #/AREA URNS AUTO: >50 /HPF

## 2019-07-08 PROCEDURE — 81001 URINALYSIS AUTO W/SCOPE: CPT | Performed by: NURSE PRACTITIONER

## 2019-07-08 PROCEDURE — 87086 URINE CULTURE/COLONY COUNT: CPT | Performed by: NURSE PRACTITIONER

## 2019-07-08 PROCEDURE — 81003 URINALYSIS AUTO W/O SCOPE: CPT | Performed by: NURSE PRACTITIONER

## 2019-07-08 PROCEDURE — 99213 OFFICE O/P EST LOW 20 MIN: CPT | Performed by: NURSE PRACTITIONER

## 2019-07-08 RX ORDER — SULFAMETHOXAZOLE AND TRIMETHOPRIM 800; 160 MG/1; MG/1
1 TABLET ORAL 2 TIMES DAILY
Qty: 6 TABLET | Refills: 0 | Status: SHIPPED | OUTPATIENT
Start: 2019-07-08 | End: 2019-07-11

## 2019-07-08 RX ORDER — PHENAZOPYRIDINE HYDROCHLORIDE 100 MG/1
100 TABLET, FILM COATED ORAL 3 TIMES DAILY PRN
Qty: 9 TABLET | Refills: 0 | Status: SHIPPED | OUTPATIENT
Start: 2019-07-08 | End: 2019-07-11

## 2019-07-08 NOTE — PATIENT INSTRUCTIONS
Pyridium 100mg three times a day as needed for urinary burning. Bactrim one tablet twice a day for three days. Increase oral fluids. Increase your probiotic to twice a day while on the antibiotic.   Notify Dr. Lex Serna of your recurring urinary tract infecti

## 2019-07-08 NOTE — PROGRESS NOTES
Patient ID:   Vidal Black  is a 77year old female    Allergies    Penicillins               Medications     Sulfamethoxazole-TMP DS (BACTRIM DS) 800-160 MG Oral Tab per tablet Take 1 tablet by mouth 2 (two) times daily for 3 days.  Disp: 6 tablet Rf her gynecologist for after intercourse but did not take it. Reports recent antibiotics Cephalexin, prescribed at outpatient center at [facility] for UTI symptoms. Has also taken Macrobid recently for UTI.      Reports a history of ten UTIs in the l in the past.     Patient Instructions   Pyridium 100mg three times a day as needed for urinary burning. Bactrim one tablet twice a day for three days. Increase oral fluids. Increase your probiotic to twice a day while on the antibiotic.   Notify Dr. Ethelyn Primrose

## 2019-07-10 ENCOUNTER — TELEPHONE (OUTPATIENT)
Dept: FAMILY MEDICINE CLINIC | Facility: CLINIC | Age: 69
End: 2019-07-10

## 2019-07-10 NOTE — TELEPHONE ENCOUNTER
----- Message from CHANNING Mahoney sent at 7/10/2019 11:00 AM CDT -----  Patient's urine culture returning at no growth for 24 hours. Please assess patient's symptoms while on antibiotic.    If no improvement, would suggest patient return for pelvi

## 2019-07-10 NOTE — TELEPHONE ENCOUNTER
Patient informed of the below results and recommendations. Patient will monitor her symptoms for now as she feels like she is improving and c/b to schedule a f/u appt if any further concerns.

## 2019-07-11 ENCOUNTER — TELEPHONE (OUTPATIENT)
Dept: FAMILY MEDICINE CLINIC | Facility: CLINIC | Age: 69
End: 2019-07-11

## 2019-07-11 DIAGNOSIS — Z78.0 POST-MENOPAUSAL: Primary | ICD-10-CM

## 2019-07-12 ENCOUNTER — OFFICE VISIT (OUTPATIENT)
Dept: FAMILY MEDICINE CLINIC | Facility: CLINIC | Age: 69
End: 2019-07-12
Payer: MEDICARE

## 2019-07-12 VITALS
RESPIRATION RATE: 18 BRPM | OXYGEN SATURATION: 98 % | TEMPERATURE: 99 F | BODY MASS INDEX: 25.23 KG/M2 | DIASTOLIC BLOOD PRESSURE: 76 MMHG | WEIGHT: 157 LBS | HEIGHT: 66 IN | SYSTOLIC BLOOD PRESSURE: 118 MMHG | HEART RATE: 85 BPM

## 2019-07-12 DIAGNOSIS — R30.9 PAINFUL URINATION: Primary | ICD-10-CM

## 2019-07-12 PROCEDURE — 87491 CHLMYD TRACH DNA AMP PROBE: CPT | Performed by: NURSE PRACTITIONER

## 2019-07-12 PROCEDURE — 99214 OFFICE O/P EST MOD 30 MIN: CPT | Performed by: NURSE PRACTITIONER

## 2019-07-12 PROCEDURE — 87591 N.GONORRHOEAE DNA AMP PROB: CPT | Performed by: NURSE PRACTITIONER

## 2019-07-12 PROCEDURE — 87660 TRICHOMONAS VAGIN DIR PROBE: CPT | Performed by: NURSE PRACTITIONER

## 2019-07-12 PROCEDURE — 87510 GARDNER VAG DNA DIR PROBE: CPT | Performed by: NURSE PRACTITIONER

## 2019-07-12 PROCEDURE — 87480 CANDIDA DNA DIR PROBE: CPT | Performed by: NURSE PRACTITIONER

## 2019-07-12 NOTE — PROGRESS NOTES
Patient ID:   Austin Perry  is a 77year old female    Allergies    Penicillins               Medications     Citalopram Hydrobromide 10 MG Oral Tab TAKE 1/2 TABLET DAILY Disp: 45 tablet Rfl: 0   Zolpidem Tartrate (AMBIEN) 5 MG Oral Tab Take 1 tablet lesions, rash, abnormal bleeding nor vaginal bleeding. Denies UTI symptoms:  urinary frequency, urgency, flank pain, suprapubic pain, fever, nausea, vomiting, hematuria, malodorous urine, cloudy urine.     Review of Systems   GENERAL:feels tired, good a diagnosis)    Patient with ongoing urinary discomfort and pelvic fullness.   Pelvic exam completed without any abnormal findings on pelvic exam.  Vaginal swabs sent for vaginitis/stenosis, as well as gonorrhea and chlamydia as patient has a new partner for

## 2019-07-13 ENCOUNTER — TELEPHONE (OUTPATIENT)
Dept: FAMILY MEDICINE CLINIC | Facility: CLINIC | Age: 69
End: 2019-07-13

## 2019-07-13 RX ORDER — SOLIFENACIN SUCCINATE 5 MG/1
TABLET, FILM COATED ORAL
Qty: 30 TABLET | Refills: 5 | Status: SHIPPED | OUTPATIENT
Start: 2019-07-13 | End: 2019-08-16

## 2019-07-13 NOTE — TELEPHONE ENCOUNTER
----- Message from CHANNING Farlye sent at 7/13/2019  8:11 AM CDT -----  Please notify the patient her first swab came back negative for yeast and vaginitis. Are awaiting gonorrhea and chlamydia swab.

## 2019-07-13 NOTE — TELEPHONE ENCOUNTER
Future appt:     Your appointments     Date & Time Appointment Department [facility])    Aug 08, 2019  2:00 PM CDT XR DEXA BONE DENSITOMETRY with [facility] [address] Dexa Department located in [facility]    It is recommended that you wea Group [place])            [facility] Dexa Department located in [address] [place] [address] Mammography Department located in [address]  [address] [place] [address]

## 2019-07-14 LAB
C TRACH DNA SPEC QL NAA+PROBE: NEGATIVE
N GONORRHOEA DNA SPEC QL NAA+PROBE: NEGATIVE

## 2019-07-15 ENCOUNTER — TELEPHONE (OUTPATIENT)
Dept: FAMILY MEDICINE CLINIC | Facility: CLINIC | Age: 69
End: 2019-07-15

## 2019-07-15 NOTE — TELEPHONE ENCOUNTER
----- Message from CHANNING Stevens sent at 7/15/2019  8:54 AM CDT -----  Please notify the patient her gonorrhea and chlamydia swab is negative.

## 2019-07-16 NOTE — TELEPHONE ENCOUNTER
Future appt:     Your appointments     Date & Time Appointment Department [facility])    Aug 08, 2019  2:00 PM CDT XR DEXA BONE DENSITOMETRY with [facility] [address] Dexa Department located in [facility])    It is recommended that you wea [facility] [place])            [facility] Dexa Department located in [address] [address]  [place] [address] Mammography Department located in [address]  [facility]

## 2019-07-17 RX ORDER — CITALOPRAM 10 MG/1
TABLET ORAL
Qty: 30 TABLET | Refills: 1 | Status: SHIPPED | OUTPATIENT
Start: 2019-07-17 | End: 2019-09-17

## 2019-08-12 ENCOUNTER — HOSPITAL ENCOUNTER (OUTPATIENT)
Dept: BONE DENSITY | Age: 69
Discharge: HOME | End: 2019-08-12
Attending: FAMILY MEDICINE
Payer: MEDICARE

## 2019-08-12 ENCOUNTER — TELEPHONE (OUTPATIENT)
Dept: FAMILY MEDICINE CLINIC | Facility: CLINIC | Age: 69
End: 2019-08-12

## 2019-08-12 DIAGNOSIS — Z78.0 POST-MENOPAUSAL: ICD-10-CM

## 2019-08-12 PROCEDURE — 77080 DXA BONE DENSITY AXIAL: CPT | Performed by: FAMILY MEDICINE

## 2019-08-12 NOTE — TELEPHONE ENCOUNTER
----- Message from CHANNING Stevens sent at 8/12/2019  5:11 PM CDT -----  Dr. Pravin Sanchez out of the office, results reviewed. The femoral neck at a 1.0 T score showing very minimal osteopenia in the femur.   Other exam findings showed no osteopenia no os

## 2019-08-16 ENCOUNTER — OFFICE VISIT (OUTPATIENT)
Dept: FAMILY MEDICINE CLINIC | Facility: CLINIC | Age: 69
End: 2019-08-16
Payer: MEDICARE

## 2019-08-16 ENCOUNTER — APPOINTMENT (OUTPATIENT)
Dept: LAB | Age: 69
End: 2019-08-16
Attending: FAMILY MEDICINE
Payer: MEDICARE

## 2019-08-16 VITALS
HEART RATE: 96 BPM | DIASTOLIC BLOOD PRESSURE: 72 MMHG | RESPIRATION RATE: 16 BRPM | BODY MASS INDEX: 24.59 KG/M2 | WEIGHT: 153 LBS | SYSTOLIC BLOOD PRESSURE: 122 MMHG | TEMPERATURE: 97 F | HEIGHT: 66 IN

## 2019-08-16 DIAGNOSIS — E53.8 B12 DEFICIENCY: ICD-10-CM

## 2019-08-16 DIAGNOSIS — Z00.00 ENCOUNTER FOR MEDICARE ANNUAL WELLNESS EXAM: Primary | ICD-10-CM

## 2019-08-16 DIAGNOSIS — D64.9 ANEMIA, UNSPECIFIED TYPE: ICD-10-CM

## 2019-08-16 DIAGNOSIS — Z23 NEED FOR VACCINATION: ICD-10-CM

## 2019-08-16 DIAGNOSIS — Z00.00 ENCOUNTER FOR ANNUAL HEALTH EXAMINATION: ICD-10-CM

## 2019-08-16 PROBLEM — R91.1 LUNG NODULE: Status: RESOLVED | Noted: 2019-05-09 | Resolved: 2019-08-16

## 2019-08-16 LAB
BASOPHILS # BLD AUTO: 0.02 X10(3) UL (ref 0–0.2)
BASOPHILS NFR BLD AUTO: 0.5 %
DEPRECATED RDW RBC AUTO: 43.7 FL (ref 35.1–46.3)
EOSINOPHIL # BLD AUTO: 0.08 X10(3) UL (ref 0–0.7)
EOSINOPHIL NFR BLD AUTO: 2.1 %
ERYTHROCYTE [DISTWIDTH] IN BLOOD BY AUTOMATED COUNT: 12.7 % (ref 11–15)
HCT VFR BLD AUTO: 41.6 % (ref 35–48)
HGB BLD-MCNC: 13.6 G/DL (ref 12–16)
IMM GRANULOCYTES # BLD AUTO: 0 X10(3) UL (ref 0–1)
IMM GRANULOCYTES NFR BLD: 0 %
LYMPHOCYTES # BLD AUTO: 1.44 X10(3) UL (ref 1–4)
LYMPHOCYTES NFR BLD AUTO: 38.4 %
MCH RBC QN AUTO: 30.6 PG (ref 26–34)
MCHC RBC AUTO-ENTMCNC: 32.7 G/DL (ref 31–37)
MCV RBC AUTO: 93.5 FL (ref 80–100)
MONOCYTES # BLD AUTO: 0.29 X10(3) UL (ref 0.1–1)
MONOCYTES NFR BLD AUTO: 7.7 %
NEUTROPHILS # BLD AUTO: 1.92 X10 (3) UL (ref 1.5–7.7)
NEUTROPHILS # BLD AUTO: 1.92 X10(3) UL (ref 1.5–7.7)
NEUTROPHILS NFR BLD AUTO: 51.3 %
PLATELET # BLD AUTO: 317 10(3)UL (ref 150–450)
RBC # BLD AUTO: 4.45 X10(6)UL (ref 3.8–5.3)
VIT B12 SERPL-MCNC: >2000 PG/ML (ref 193–986)
WBC # BLD AUTO: 3.8 X10(3) UL (ref 4–11)

## 2019-08-16 PROCEDURE — 36415 COLL VENOUS BLD VENIPUNCTURE: CPT | Performed by: FAMILY MEDICINE

## 2019-08-16 PROCEDURE — 82607 VITAMIN B-12: CPT | Performed by: FAMILY MEDICINE

## 2019-08-16 PROCEDURE — G0439 PPPS, SUBSEQ VISIT: HCPCS | Performed by: FAMILY MEDICINE

## 2019-08-16 PROCEDURE — 85025 COMPLETE CBC W/AUTO DIFF WBC: CPT | Performed by: FAMILY MEDICINE

## 2019-08-16 PROCEDURE — G0009 ADMIN PNEUMOCOCCAL VACCINE: HCPCS | Performed by: FAMILY MEDICINE

## 2019-08-16 PROCEDURE — 90732 PPSV23 VACC 2 YRS+ SUBQ/IM: CPT | Performed by: FAMILY MEDICINE

## 2019-08-16 RX ORDER — TRIMETHOPRIM 100 MG/1
TABLET ORAL
COMMUNITY
Start: 2019-08-13 | End: 2021-05-19 | Stop reason: ALTCHOICE

## 2019-08-16 RX ORDER — MIRABEGRON 25 MG/1
1 TABLET, FILM COATED, EXTENDED RELEASE ORAL DAILY
COMMUNITY
Start: 2019-08-12 | End: 2019-11-27

## 2019-08-16 RX ORDER — OMEPRAZOLE 20 MG/1
1 CAPSULE, DELAYED RELEASE ORAL DAILY
Refills: 1 | COMMUNITY
Start: 2019-07-10 | End: 2020-12-17

## 2019-08-16 NOTE — PATIENT INSTRUCTIONS
Continue current medications  Pneumovax given today. Recommend new shingles vaccine. Check labs today. Will have cystoscopy with urologist.   Return to clinic if any concern.      Izzy Monzon's SCREENING SCHEDULE   Tests on this list are rec smoked more than 100 cigarettes in their lifetime   • Anyone with a family history    Colorectal Cancer Screening  Covered up to Age 76     Colonoscopy Screen   Covered every 10 years- more often if abnormal Colonoscopy due on 03/02/2023 [facility] previous visit.  Please get every year    Pneumococcal 13 (Prevnar)  Covered Once after 65 Orders placed or performed in visit on 08/15/18   • PNEUMOCOCCAL VACC, 13 ERVIN IM    Please get once after your 65th birthday    Pneumococcal 23 (Pneumovax)  Covered O

## 2019-08-16 NOTE — PROGRESS NOTES
HPI:   Giselle Lozano is a 77year old female who presents for a Medicare Subsequent Annual Wellness visit (Pt already had Initial Annual Wellness). Patient has history of B12 deficiency anemia. Currently she is taking B12 supplement.   Also has Problem List:     Familial multiple lipoprotein-type hyperlipidemia     Benign neoplasm of cerebral meninges (HCC)     Migraine     Insomnia     B12 deficiency     Anemia     Dysuria    Wt Readings from Last 3 Encounters:  08/16/19 : 153 lb  07/12/19 : 157 Depression, Endometriosis, History of malaria, Meningioma ([address] Microscopic hematuria, Ovarian cyst, and Pineal gland cyst.    She  has a past surgical history that includes removal of ovarian cyst(s); tonsillectomy; and cataract.     Her family history in normocephalic; normal nose, pharynx and TM's  NECK: supple; FROM; no JVD, no TMG, no carotid bruits  RESPIRATORY: clear to percussion and auscultation  CARDIOVASCULAR: S1, S2 normal, RRR; no S3, no S4; no click; murmur negative  ABDOMEN: normal active BS+, 8/16/2019     General Health     In the past six months, have you lost more than 10 pounds without trying?: 2 - No  Has your appetite been poor?: No  How does the patient maintain a good energy level?: Appropriate Exercise;Daily Walks  How would you descri Screening Mammogram      Mammogram Annually to 77, then as discussed Mammogram due on 04/22/2020 Update Health Maintenance if applicable     Immunizations (Update Immunization Activity if applicable)     Influenza  Covered Annually 10/18/2016 Please get

## 2019-08-19 ENCOUNTER — TELEPHONE (OUTPATIENT)
Dept: FAMILY MEDICINE CLINIC | Facility: CLINIC | Age: 69
End: 2019-08-19

## 2019-08-19 NOTE — TELEPHONE ENCOUNTER
----- Message from Jax Brown MD sent at 8/16/2019  5:42 PM CDT -----  Please inform patient that her vitamin B12 is still very high, it is showing up as more than 2000, recommend to cut back on B12 supplement.   Her hemoglobin level is improved and is n

## 2019-08-21 ENCOUNTER — TELEPHONE (OUTPATIENT)
Dept: FAMILY MEDICINE CLINIC | Facility: CLINIC | Age: 69
End: 2019-08-21

## 2019-08-21 NOTE — TELEPHONE ENCOUNTER
Elevated b12 could cause heat intolerance, anxiety, palpitation. Recommend to hold for 3 months. Recheck B12 and WBC in 3 months. Return to clinic sooner if any sign of infection.

## 2019-08-21 NOTE — TELEPHONE ENCOUNTER
Patient is calling with a couple of questions on recent lab work. Patient is wondering how long she should be off the B12 supplement? And when should she recheck? Patient is also wondering what the potential problems could due to elevated b12 level?

## 2019-09-14 ENCOUNTER — TELEPHONE (OUTPATIENT)
Dept: FAMILY MEDICINE CLINIC | Facility: CLINIC | Age: 69
End: 2019-09-14

## 2019-09-14 NOTE — TELEPHONE ENCOUNTER
C/O of palpitations, worse at night, and b/p creeping up. B/P has been systolic [phone]. Denies chest pain, shortness of breath or dizziness. Discussed with Sabi Mcclain NP. Appt with [facility] given Monday 9/16/19. Encouraged to keep hydradeted and ad

## 2019-09-17 ENCOUNTER — OFFICE VISIT (OUTPATIENT)
Dept: FAMILY MEDICINE CLINIC | Facility: CLINIC | Age: 69
End: 2019-09-17
Payer: MEDICARE

## 2019-09-17 ENCOUNTER — HOSPITAL ENCOUNTER (OUTPATIENT)
Dept: CV DIAGNOSTICS | Age: 69
Discharge: HOME | End: 2019-09-17
Attending: NURSE PRACTITIONER
Payer: MEDICARE

## 2019-09-17 ENCOUNTER — APPOINTMENT (OUTPATIENT)
Dept: LAB | Age: 69
End: 2019-09-17
Attending: NURSE PRACTITIONER
Payer: MEDICARE

## 2019-09-17 VITALS
OXYGEN SATURATION: 100 % | BODY MASS INDEX: 23.78 KG/M2 | SYSTOLIC BLOOD PRESSURE: 100 MMHG | HEIGHT: 66 IN | RESPIRATION RATE: 16 BRPM | DIASTOLIC BLOOD PRESSURE: 70 MMHG | WEIGHT: 148 LBS | TEMPERATURE: 98 F | HEART RATE: 79 BPM

## 2019-09-17 DIAGNOSIS — R00.2 PALPITATIONS: ICD-10-CM

## 2019-09-17 DIAGNOSIS — R00.2 PALPITATIONS: Primary | ICD-10-CM

## 2019-09-17 DIAGNOSIS — R79.9 ABNORMAL BLOOD CHEMISTRY: ICD-10-CM

## 2019-09-17 DIAGNOSIS — R74.8 ELEVATED VITAMIN B12 LEVEL: ICD-10-CM

## 2019-09-17 LAB
ALBUMIN SERPL-MCNC: 3.9 G/DL (ref 3.4–5)
ALBUMIN/GLOB SERPL: 1.1 {RATIO} (ref 1–2)
ALP LIVER SERPL-CCNC: 53 U/L (ref 55–142)
ALT SERPL-CCNC: 17 U/L (ref 13–56)
ANION GAP SERPL CALC-SCNC: 8 MMOL/L (ref 0–18)
AST SERPL-CCNC: 19 U/L (ref 15–37)
BASOPHILS # BLD AUTO: 0.02 X10(3) UL (ref 0–0.2)
BASOPHILS NFR BLD AUTO: 0.4 %
BILIRUB SERPL-MCNC: 0.5 MG/DL (ref 0.1–2)
BUN BLD-MCNC: 11 MG/DL (ref 7–18)
BUN/CREAT SERPL: 11.2 (ref 10–20)
CALCIUM BLD-MCNC: 9.3 MG/DL (ref 8.5–10.1)
CHLORIDE SERPL-SCNC: 101 MMOL/L (ref 98–112)
CO2 SERPL-SCNC: 27 MMOL/L (ref 21–32)
CREAT BLD-MCNC: 0.98 MG/DL (ref 0.55–1.02)
DEPRECATED RDW RBC AUTO: 43 FL (ref 35.1–46.3)
EOSINOPHIL # BLD AUTO: 0.04 X10(3) UL (ref 0–0.7)
EOSINOPHIL NFR BLD AUTO: 0.8 %
ERYTHROCYTE [DISTWIDTH] IN BLOOD BY AUTOMATED COUNT: 12.6 % (ref 11–15)
GLOBULIN PLAS-MCNC: 3.5 G/DL (ref 2.8–4.4)
GLUCOSE BLD-MCNC: 86 MG/DL (ref 70–99)
HAV IGM SER QL: 2.1 MG/DL (ref 1.6–2.6)
HCT VFR BLD AUTO: 36 % (ref 35–48)
HGB BLD-MCNC: 12 G/DL (ref 12–16)
IMM GRANULOCYTES # BLD AUTO: 0.01 X10(3) UL (ref 0–1)
IMM GRANULOCYTES NFR BLD: 0.2 %
LYMPHOCYTES # BLD AUTO: 1.56 X10(3) UL (ref 1–4)
LYMPHOCYTES NFR BLD AUTO: 31.1 %
M PROTEIN MFR SERPL ELPH: 7.4 G/DL (ref 6.4–8.2)
MCH RBC QN AUTO: 30.9 PG (ref 26–34)
MCHC RBC AUTO-ENTMCNC: 33.3 G/DL (ref 31–37)
MCV RBC AUTO: 92.8 FL (ref 80–100)
MONOCYTES # BLD AUTO: 0.34 X10(3) UL (ref 0.1–1)
MONOCYTES NFR BLD AUTO: 6.8 %
NEUTROPHILS # BLD AUTO: 3.05 X10 (3) UL (ref 1.5–7.7)
NEUTROPHILS # BLD AUTO: 3.05 X10(3) UL (ref 1.5–7.7)
NEUTROPHILS NFR BLD AUTO: 60.7 %
OSMOLALITY SERPL CALC.SUM OF ELEC: 281 MOSM/KG (ref 275–295)
PLATELET # BLD AUTO: 285 10(3)UL (ref 150–450)
POTASSIUM SERPL-SCNC: 4.4 MMOL/L (ref 3.5–5.1)
RBC # BLD AUTO: 3.88 X10(6)UL (ref 3.8–5.3)
SODIUM SERPL-SCNC: 136 MMOL/L (ref 136–145)
T4 FREE SERPL-MCNC: 1 NG/DL (ref 0.8–1.7)
TSI SER-ACNC: 2 MIU/ML (ref 0.36–3.74)
VIT B12 SERPL-MCNC: 672 PG/ML (ref 193–986)
WBC # BLD AUTO: 5 X10(3) UL (ref 4–11)

## 2019-09-17 PROCEDURE — 36415 COLL VENOUS BLD VENIPUNCTURE: CPT | Performed by: NURSE PRACTITIONER

## 2019-09-17 PROCEDURE — 82607 VITAMIN B-12: CPT | Performed by: NURSE PRACTITIONER

## 2019-09-17 PROCEDURE — 93000 ELECTROCARDIOGRAM COMPLETE: CPT | Performed by: NURSE PRACTITIONER

## 2019-09-17 PROCEDURE — 80053 COMPREHEN METABOLIC PANEL: CPT | Performed by: NURSE PRACTITIONER

## 2019-09-17 PROCEDURE — 84443 ASSAY THYROID STIM HORMONE: CPT | Performed by: NURSE PRACTITIONER

## 2019-09-17 PROCEDURE — 83735 ASSAY OF MAGNESIUM: CPT | Performed by: NURSE PRACTITIONER

## 2019-09-17 PROCEDURE — 93227 XTRNL ECG REC<48 HR R&I: CPT | Performed by: NURSE PRACTITIONER

## 2019-09-17 PROCEDURE — 84439 ASSAY OF FREE THYROXINE: CPT | Performed by: NURSE PRACTITIONER

## 2019-09-17 PROCEDURE — 99214 OFFICE O/P EST MOD 30 MIN: CPT | Performed by: NURSE PRACTITIONER

## 2019-09-17 PROCEDURE — 85025 COMPLETE CBC W/AUTO DIFF WBC: CPT | Performed by: NURSE PRACTITIONER

## 2019-09-17 PROCEDURE — 93225 XTRNL ECG REC<48 HRS REC: CPT | Performed by: NURSE PRACTITIONER

## 2019-09-17 RX ORDER — LORAZEPAM 2 MG/1
TABLET ORAL
Refills: 0 | COMMUNITY
Start: 2019-08-22 | End: 2019-11-27

## 2019-09-17 RX ORDER — CITALOPRAM 10 MG/1
TABLET ORAL
Qty: 30 TABLET | Refills: 2 | Status: SHIPPED | OUTPATIENT
Start: 2019-09-17 | End: 2020-01-06

## 2019-09-17 NOTE — PATIENT INSTRUCTIONS
We will check nonfasting blood work today including B12 level. EKG today. Follow-up for 24-hour Holter monitor. Follow-up with Dr. Abi Alberts or Ruba Swanson NP for sleep evaluation.     If any lightheadedness, feeling of fainting, chest pain, shortn

## 2019-09-17 NOTE — PROGRESS NOTES
Lowry Bamberger is a 77year old female. Patient presents with:  Palpitations  Abnormal Labs    HPI:   Complaints of palpitations- wakes her up at night   Grinds teeth- at night- does get up at night to void 2 times a night   Dad with sleep apnea. as needed for discomfort. Disp: 1 Tube Rfl: 0   Ospemifene (OSPHENA) 60 MG Oral Tab Take by mouth daily. Disp:  Rfl:    Calcium Carb-Cholecalciferol (CALCIUM 500 +D) 500-400 MG-UNIT Oral Tab Take 500 mg by mouth daily.    Disp:  Rfl:    Vitamin B-12 100 MCG tenderness  MUSCULOSKELETAL: no edema, normal strength and tone  PSYCH: alert and oriented x 3 well-groomed, good eye contact, flat affect.   10, 6, 3    ASSESSMENT AND PLAN:     Palpitations  (primary encounter diagnosis)  Abnormal blood chemistry  Elevate

## 2019-09-18 ENCOUNTER — TELEPHONE (OUTPATIENT)
Dept: FAMILY MEDICINE CLINIC | Facility: CLINIC | Age: 69
End: 2019-09-18

## 2019-09-18 NOTE — TELEPHONE ENCOUNTER
----- Message from CHANNING Cody sent at 9/18/2019  9:17 AM CDT -----  Please make sure patient receives message from my chart as below-  Your blood work is essentially normal–B12 levels are back to normal.  Metabolic panel, CBC, magnesium, thyroid

## 2019-09-18 NOTE — TELEPHONE ENCOUNTER
Informed pt of her lab results. Pt expressed understanding and thanks. Pt has holter monitor on now.

## 2019-09-23 ENCOUNTER — OFFICE VISIT (OUTPATIENT)
Dept: FAMILY MEDICINE CLINIC | Facility: CLINIC | Age: 69
End: 2019-09-23
Payer: MEDICARE

## 2019-09-23 VITALS
SYSTOLIC BLOOD PRESSURE: 108 MMHG | RESPIRATION RATE: 16 BRPM | TEMPERATURE: 98 F | BODY MASS INDEX: 23.95 KG/M2 | DIASTOLIC BLOOD PRESSURE: 64 MMHG | HEART RATE: 66 BPM | HEIGHT: 66 IN | OXYGEN SATURATION: 99 % | WEIGHT: 149 LBS

## 2019-09-23 DIAGNOSIS — R00.2 PALPITATIONS: Primary | ICD-10-CM

## 2019-09-23 DIAGNOSIS — G25.81 RLS (RESTLESS LEGS SYNDROME): ICD-10-CM

## 2019-09-23 DIAGNOSIS — G47.9 SLEEP DISTURBANCES: ICD-10-CM

## 2019-09-23 PROCEDURE — 99214 OFFICE O/P EST MOD 30 MIN: CPT | Performed by: NURSE PRACTITIONER

## 2019-09-23 NOTE — PROGRESS NOTES
[facility] [place]  SLEEP PROGRESS NOTE        HPI:   This is a 77year old female coming in for Patient presents with:  Consult: sleep consult      HPI:     Present at the request of Fartun Butcher. Has been waking up with palpitations.  Also w Not on file    Social History    Tobacco Use      Smoking status: Never Smoker      Smokeless tobacco: Never Used    Alcohol use:  Yes      Alcohol/week: 0.0 standard drinks      Comment: 1/ month    Drug use: No    Family History:  Family History   Prob Dysuria      REVIEW OF SYSTEMS:   Review of Systems   Constitutional: Negative. HENT: Negative. Eyes: Negative. Respiratory: Negative. Cardiovascular: Positive for palpitations. Musculoskeletal: Negative. Skin: Negative.     Neurological: N SLEEP MEDICINE - EXTERNAL    2. Sleep disturbances  - SLEEP MEDICINE - EXTERNAL    3. RLS (restless legs syndrome)  - SLEEP MEDICINE - EXTERNAL    Patient Instructions   Order for sleep study will be sent to [facility].   They will call to set up

## 2019-09-23 NOTE — PATIENT INSTRUCTIONS
Order for sleep study will be sent to [facility]. They will call to set up an appointment in the next week. If not heard from them, please call them at [phone]. If any problems, please call us at [phone].       Recheck about 2 weeks

## 2019-09-25 ENCOUNTER — TELEPHONE (OUTPATIENT)
Dept: FAMILY MEDICINE CLINIC | Facility: CLINIC | Age: 69
End: 2019-09-25

## 2019-09-25 NOTE — TELEPHONE ENCOUNTER
----- Message from CHANNING Spaulding sent at 9/25/2019  1:26 PM CDT -----  Please notify patient that her Holter monitor was essentially normal.  The palpitation symptoms that she experienced during the Holter monitor were not associated with any heart

## 2019-10-23 ENCOUNTER — OFFICE VISIT (OUTPATIENT)
Dept: FAMILY MEDICINE CLINIC | Facility: CLINIC | Age: 69
End: 2019-10-23
Payer: MEDICARE

## 2019-10-23 VITALS
BODY MASS INDEX: 24.27 KG/M2 | OXYGEN SATURATION: 98 % | SYSTOLIC BLOOD PRESSURE: 118 MMHG | DIASTOLIC BLOOD PRESSURE: 78 MMHG | TEMPERATURE: 99 F | HEIGHT: 66 IN | HEART RATE: 77 BPM | WEIGHT: 151 LBS

## 2019-10-23 DIAGNOSIS — B34.9 VIRAL ILLNESS: ICD-10-CM

## 2019-10-23 DIAGNOSIS — J00 ACUTE NASOPHARYNGITIS: Primary | ICD-10-CM

## 2019-10-23 DIAGNOSIS — R53.83 FATIGUE, UNSPECIFIED TYPE: ICD-10-CM

## 2019-10-23 PROCEDURE — 99214 OFFICE O/P EST MOD 30 MIN: CPT | Performed by: NURSE PRACTITIONER

## 2019-10-23 NOTE — PATIENT INSTRUCTIONS
\"The Immune System Recovery Plan \" Dr. Tia Agudelo     Try Gluten free, dairy free, organic fruits and veggies, and grass fed meat. It is important to get enough sleep (at least 7 hrs a night).   Increase EXERCISE, eat a healthy diet (5 fruits and/or v

## 2019-10-23 NOTE — PROGRESS NOTES
CHIEF COMPLAINT:   Patient presents with:  Sore Throat  Fatigue      HPI:   Isauro Walter is a 77year old female who presents to clinic today with complaints of the last few days - minor sore throat - sores under tongue- feels like she is fighti MCG Oral Tab, Take 100 mcg by mouth daily. , Disp: , Rfl:        Past Medical History:   Diagnosis Date   • Depression    • Endometriosis    • History of malaria    • Meningioma (HCC)    • Microscopic hematuria    • Ovarian cyst    • Pineal gland cyst Dr. Genia Lopez     Try Gluten free, dairy free, organic fruits and veggies, and grass fed meat. It is important to get enough sleep (at least 7 hrs a night).   Increase EXERCISE, eat a healthy diet (5 fruits and/or vegetables a day), stay hydrated,  alex

## 2019-10-24 ENCOUNTER — TELEPHONE (OUTPATIENT)
Dept: FAMILY MEDICINE CLINIC | Facility: CLINIC | Age: 69
End: 2019-10-24

## 2019-10-24 ENCOUNTER — OFFICE VISIT (OUTPATIENT)
Dept: FAMILY MEDICINE CLINIC | Facility: CLINIC | Age: 69
End: 2019-10-24
Payer: MEDICARE

## 2019-10-24 VITALS
BODY MASS INDEX: 24.27 KG/M2 | SYSTOLIC BLOOD PRESSURE: 110 MMHG | TEMPERATURE: 98 F | DIASTOLIC BLOOD PRESSURE: 72 MMHG | OXYGEN SATURATION: 98 % | HEART RATE: 75 BPM | HEIGHT: 66 IN | WEIGHT: 151 LBS

## 2019-10-24 DIAGNOSIS — R30.0 BURNING WITH URINATION: ICD-10-CM

## 2019-10-24 DIAGNOSIS — R35.0 FREQUENCY OF URINATION: Primary | ICD-10-CM

## 2019-10-24 DIAGNOSIS — B99.9 RECURRENT INFECTIONS: Primary | ICD-10-CM

## 2019-10-24 PROCEDURE — 81001 URINALYSIS AUTO W/SCOPE: CPT | Performed by: NURSE PRACTITIONER

## 2019-10-24 PROCEDURE — 81003 URINALYSIS AUTO W/O SCOPE: CPT | Performed by: NURSE PRACTITIONER

## 2019-10-24 PROCEDURE — 87086 URINE CULTURE/COLONY COUNT: CPT | Performed by: NURSE PRACTITIONER

## 2019-10-24 PROCEDURE — 99213 OFFICE O/P EST LOW 20 MIN: CPT | Performed by: NURSE PRACTITIONER

## 2019-10-24 RX ORDER — SULFAMETHOXAZOLE AND TRIMETHOPRIM 800; 160 MG/1; MG/1
1 TABLET ORAL 2 TIMES DAILY
COMMUNITY
End: 2019-10-24

## 2019-10-24 RX ORDER — CIPROFLOXACIN 500 MG/1
500 TABLET, FILM COATED ORAL 2 TIMES DAILY
Qty: 20 TABLET | Refills: 0 | Status: SHIPPED | OUTPATIENT
Start: 2019-10-24 | End: 2019-11-03

## 2019-10-24 RX ORDER — SULFAMETHOXAZOLE AND TRIMETHOPRIM 800; 160 MG/1; MG/1
1 TABLET ORAL 2 TIMES DAILY
Qty: 20 TABLET | Refills: 0 | Status: SHIPPED | OUTPATIENT
Start: 2019-10-24 | End: 2019-11-27

## 2019-10-24 NOTE — TELEPHONE ENCOUNTER
Pt would like an referral to a immunologist in the city. Pt will call back with a DR of the facility she would like to go.     Phone number is [phone] to Immunologist.

## 2019-10-24 NOTE — TELEPHONE ENCOUNTER
Please place order for referral for Dr. Natanael Marshall immunologist.     Pt was informed that Berhta Sinclair will be out of the office until Monday. Pt was fine with that.

## 2019-10-24 NOTE — PROGRESS NOTES
Patient ID:   Waldemar Schaffer  is a 77year old female    Allergies    Penicillins               Medications   Ciprofloxacin HCl 500 MG Oral Tab, Take 1 tablet (500 mg total) by mouth 2 (two) times daily for 10 days. , Disp: 20 tablet, Rfl: 0  LORazepam complaints   GENITOURINARY:see HPI   GASTROINTESTINAL: Denies constipation, diarrhea, change in bowels.   Denies nausea or vomiting  CARDIOVASCULAR: denies complaints  NEURO: denies complaints   SKIN: Denies rashes or lesions      PHYSICAL EXAM:    Physical

## 2019-10-28 ENCOUNTER — TELEPHONE (OUTPATIENT)
Dept: FAMILY MEDICINE CLINIC | Facility: CLINIC | Age: 69
End: 2019-10-28

## 2019-10-28 NOTE — TELEPHONE ENCOUNTER
----- Message from CHANNING Byers sent at 10/28/2019  8:58 AM CDT -----  Please notify patient that her urine culture came back negative-I believe she may have taken a leftover dose of antibiotic–this could cause a false negative–how she feeling?   Jonathan Trivedi

## 2019-10-28 NOTE — TELEPHONE ENCOUNTER
Informed pt of her UA results. Pt states she is still having the urgency but no pain. Faxed referral to Dr. Orlando Butcher to schedule appt. Pt expressed understanding and thanks.

## 2019-11-06 RX ORDER — CITALOPRAM 10 MG/1
TABLET ORAL
Qty: 30 TABLET | Refills: 2 | Status: SHIPPED | OUTPATIENT
Start: 2019-11-06 | End: 2019-11-27

## 2019-11-27 ENCOUNTER — OFFICE VISIT (OUTPATIENT)
Dept: FAMILY MEDICINE CLINIC | Facility: CLINIC | Age: 69
End: 2019-11-27
Payer: MEDICARE

## 2019-11-27 VITALS
HEART RATE: 87 BPM | BODY MASS INDEX: 23.63 KG/M2 | DIASTOLIC BLOOD PRESSURE: 80 MMHG | WEIGHT: 147 LBS | OXYGEN SATURATION: 99 % | TEMPERATURE: 99 F | HEIGHT: 66 IN | SYSTOLIC BLOOD PRESSURE: 122 MMHG

## 2019-11-27 DIAGNOSIS — J01.10 ACUTE NON-RECURRENT FRONTAL SINUSITIS: Primary | ICD-10-CM

## 2019-11-27 PROCEDURE — 99213 OFFICE O/P EST LOW 20 MIN: CPT | Performed by: NURSE PRACTITIONER

## 2019-11-27 RX ORDER — BENZONATATE 200 MG/1
200 CAPSULE ORAL 3 TIMES DAILY PRN
Qty: 30 CAPSULE | Refills: 1 | Status: SHIPPED | OUTPATIENT
Start: 2019-11-27 | End: 2020-01-07 | Stop reason: ALTCHOICE

## 2019-11-27 RX ORDER — SULFAMETHOXAZOLE AND TRIMETHOPRIM 800; 160 MG/1; MG/1
1 TABLET ORAL 2 TIMES DAILY
Qty: 20 TABLET | Refills: 0 | Status: SHIPPED | OUTPATIENT
Start: 2019-11-27 | End: 2019-12-07

## 2019-11-27 NOTE — PATIENT INSTRUCTIONS
Rest, increase fluids, salt water gargles ,neti pot (use distilled water) or saline nasal spray, Mucinex-D 60/600(behind the counter), in the am - Nyquil at night. Tylenol/Ibuprofen, follow up if symptoms persist or increase.

## 2019-11-27 NOTE — PROGRESS NOTES
CHIEF COMPLAINT:   Patient presents with:  Sinus Problem  Cough      HPI:   Martínez Martinez is a 77year old female who presents to clinic today with complaints of feeling ill since Thursday- congestion-p coughing up sputum - nasal congestion- pres 1/ month    Drug use: No       REVIEW OF SYSTEMS:   GENERAL: See HPI  SKIN: no unusual skin lesions or rashes  HEENT: See HPI  LUNGS: see HPI   CARDIOVASCULAR: denies complaints   GI: No N/V/C/D.   NEURO: denies complaints    EXAM:   /80 (BP Location: benefits of medication discussed. Stressed importance of completing full course of antibiotic.        Meds & Refills for this Visit:  Requested Prescriptions     Signed Prescriptions Disp Refills   • Sulfamethoxazole-TMP -160 MG Oral Tab per tablet 2

## 2020-01-06 ENCOUNTER — TELEPHONE (OUTPATIENT)
Dept: FAMILY MEDICINE CLINIC | Facility: CLINIC | Age: 70
End: 2020-01-06

## 2020-01-06 RX ORDER — CITALOPRAM 10 MG/1
TABLET ORAL
Qty: 30 TABLET | Refills: 2 | Status: SHIPPED | OUTPATIENT
Start: 2020-01-06 | End: 2020-04-01

## 2020-01-06 NOTE — TELEPHONE ENCOUNTER
Please advise refill of Citalopram 10mg. Last Rx: 9/17/19    Future appt:     Your appointments     Date & Time Appointment Department [facility])    Jan 07, 2020 10:30 AM CST Exam - Established with Jayjay Cash, [address] [place] [address],

## 2020-01-06 NOTE — TELEPHONE ENCOUNTER
Your Appointments    Tuesday January 07, 2020 10:30 AM CST  Exam - Established with Lili Michel, APRN  [facility], [address] [id number] [phone]           Vanice Gaucher

## 2020-01-07 ENCOUNTER — OFFICE VISIT (OUTPATIENT)
Dept: FAMILY MEDICINE CLINIC | Facility: CLINIC | Age: 70
End: 2020-01-07
Payer: COMMERCIAL

## 2020-01-07 VITALS
RESPIRATION RATE: 17 BRPM | HEIGHT: 66 IN | TEMPERATURE: 99 F | HEART RATE: 78 BPM | WEIGHT: 147.63 LBS | BODY MASS INDEX: 23.72 KG/M2 | SYSTOLIC BLOOD PRESSURE: 122 MMHG | DIASTOLIC BLOOD PRESSURE: 74 MMHG

## 2020-01-07 DIAGNOSIS — R53.83 FATIGUE, UNSPECIFIED TYPE: ICD-10-CM

## 2020-01-07 DIAGNOSIS — E53.8 B12 DEFICIENCY: ICD-10-CM

## 2020-01-07 DIAGNOSIS — A09 DIARRHEA OF INFECTIOUS ORIGIN: Primary | ICD-10-CM

## 2020-01-07 LAB
ALBUMIN SERPL-MCNC: 3.7 G/DL (ref 3.4–5)
ALBUMIN/GLOB SERPL: 1 {RATIO} (ref 1–2)
ALP LIVER SERPL-CCNC: 68 U/L (ref 55–142)
ALT SERPL-CCNC: 18 U/L (ref 13–56)
ANION GAP SERPL CALC-SCNC: 5 MMOL/L (ref 0–18)
AST SERPL-CCNC: 20 U/L (ref 15–37)
BASOPHILS # BLD AUTO: 0.03 X10(3) UL (ref 0–0.2)
BASOPHILS NFR BLD AUTO: 0.5 %
BILIRUB SERPL-MCNC: 0.4 MG/DL (ref 0.1–2)
BUN BLD-MCNC: 12 MG/DL (ref 7–18)
BUN/CREAT SERPL: 11.7 (ref 10–20)
CALCIUM BLD-MCNC: 9.5 MG/DL (ref 8.5–10.1)
CHLORIDE SERPL-SCNC: 108 MMOL/L (ref 98–112)
CO2 SERPL-SCNC: 28 MMOL/L (ref 21–32)
CREAT BLD-MCNC: 1.03 MG/DL (ref 0.55–1.02)
DEPRECATED RDW RBC AUTO: 45.5 FL (ref 35.1–46.3)
EOSINOPHIL # BLD AUTO: 0.06 X10(3) UL (ref 0–0.7)
EOSINOPHIL NFR BLD AUTO: 1 %
ERYTHROCYTE [DISTWIDTH] IN BLOOD BY AUTOMATED COUNT: 12.7 % (ref 11–15)
GLOBULIN PLAS-MCNC: 3.7 G/DL (ref 2.8–4.4)
GLUCOSE BLD-MCNC: 85 MG/DL (ref 70–99)
HCT VFR BLD AUTO: 38.6 % (ref 35–48)
HGB BLD-MCNC: 12.1 G/DL (ref 12–16)
IMM GRANULOCYTES # BLD AUTO: 0.01 X10(3) UL (ref 0–1)
IMM GRANULOCYTES NFR BLD: 0.2 %
LYMPHOCYTES # BLD AUTO: 1.93 X10(3) UL (ref 1–4)
LYMPHOCYTES NFR BLD AUTO: 32.9 %
M PROTEIN MFR SERPL ELPH: 7.4 G/DL (ref 6.4–8.2)
MCH RBC QN AUTO: 30.4 PG (ref 26–34)
MCHC RBC AUTO-ENTMCNC: 31.3 G/DL (ref 31–37)
MCV RBC AUTO: 97 FL (ref 80–100)
MONOCYTES # BLD AUTO: 0.34 X10(3) UL (ref 0.1–1)
MONOCYTES NFR BLD AUTO: 5.8 %
NEUTROPHILS # BLD AUTO: 3.5 X10 (3) UL (ref 1.5–7.7)
NEUTROPHILS # BLD AUTO: 3.5 X10(3) UL (ref 1.5–7.7)
NEUTROPHILS NFR BLD AUTO: 59.6 %
OSMOLALITY SERPL CALC.SUM OF ELEC: 291 MOSM/KG (ref 275–295)
PATIENT FASTING Y/N/NP: NO
PLATELET # BLD AUTO: 376 10(3)UL (ref 150–450)
POTASSIUM SERPL-SCNC: 4.9 MMOL/L (ref 3.5–5.1)
RBC # BLD AUTO: 3.98 X10(6)UL (ref 3.8–5.3)
SODIUM SERPL-SCNC: 141 MMOL/L (ref 136–145)
T4 FREE SERPL-MCNC: 0.9 NG/DL (ref 0.8–1.7)
TSI SER-ACNC: 2.22 MIU/ML (ref 0.36–3.74)
VIT B12 SERPL-MCNC: 1133 PG/ML (ref 193–986)
WBC # BLD AUTO: 5.9 X10(3) UL (ref 4–11)

## 2020-01-07 PROCEDURE — 84439 ASSAY OF FREE THYROXINE: CPT | Performed by: NURSE PRACTITIONER

## 2020-01-07 PROCEDURE — 80053 COMPREHEN METABOLIC PANEL: CPT | Performed by: NURSE PRACTITIONER

## 2020-01-07 PROCEDURE — 85025 COMPLETE CBC W/AUTO DIFF WBC: CPT | Performed by: NURSE PRACTITIONER

## 2020-01-07 PROCEDURE — 84443 ASSAY THYROID STIM HORMONE: CPT | Performed by: NURSE PRACTITIONER

## 2020-01-07 PROCEDURE — 82607 VITAMIN B-12: CPT | Performed by: NURSE PRACTITIONER

## 2020-01-07 PROCEDURE — 99214 OFFICE O/P EST MOD 30 MIN: CPT | Performed by: NURSE PRACTITIONER

## 2020-01-07 PROCEDURE — 36415 COLL VENOUS BLD VENIPUNCTURE: CPT | Performed by: NURSE PRACTITIONER

## 2020-01-07 NOTE — PROGRESS NOTES
Jessica Prasad is a 77year old female. Patient presents with:  Diarrhea: symptom started approx.  3 weeks ago  Bloating: started 3 weeks ago with cramping, fatigue and abdomen discomfort      HPI:   Complaints of diarrhea  State she saw immunologis • Calcium Carb-Cholecalciferol (CALCIUM 500 +D) 500-400 MG-UNIT Oral Tab Take 500 mg by mouth daily.           Past Medical History:   Diagnosis Date   • Depression    • Endometriosis    • History of malaria    • Meningioma (HCC)    • Microscopic hematuri diagnosis)  Fatigue, unspecified type  B12 deficiency    Orders Placed This Encounter      CBC W Differential W Platelet [E]      Comp Metabolic Panel (14) [E]      TSH and Free T4 [E]      Vitamin B12 [E]      Anaerobic Culture [E]      C. diff toxigenic

## 2020-01-08 ENCOUNTER — TELEPHONE (OUTPATIENT)
Dept: FAMILY MEDICINE CLINIC | Facility: CLINIC | Age: 70
End: 2020-01-08

## 2020-01-08 NOTE — TELEPHONE ENCOUNTER
Message left for patient on cell phone that a my chart message was left for her from [facility] with test results. Advised to call with further questions.

## 2020-01-08 NOTE — TELEPHONE ENCOUNTER
----- Message from CHANNING Anthony sent at 1/8/2020  8:59 AM CST -----  My chart message sent-  So far your stool is negative for Cryptosporidium and Giardia. Other cultures are pending.   Thank you,  CHANNING Anthony, [facility]

## 2020-01-10 ENCOUNTER — TELEPHONE (OUTPATIENT)
Dept: FAMILY MEDICINE CLINIC | Facility: CLINIC | Age: 70
End: 2020-01-10

## 2020-01-10 NOTE — TELEPHONE ENCOUNTER
----- Message from CHANNING Anne sent at 1/10/2020  3:42 PM CST -----  Please make sure patient receives my chart message as below-  Your stool cultures are negative for Cyclospora and ova and parasite.   You can repeat the ova and parasite test 2 m

## 2020-01-15 ENCOUNTER — LABORATORY ENCOUNTER (OUTPATIENT)
Dept: LAB | Age: 70
End: 2020-01-15
Attending: NURSE PRACTITIONER
Payer: MEDICARE

## 2020-01-15 DIAGNOSIS — R19.7 DIARRHEA, UNSPECIFIED TYPE: ICD-10-CM

## 2020-01-15 DIAGNOSIS — A09 DIARRHEA OF INFECTIOUS ORIGIN: ICD-10-CM

## 2020-01-15 LAB
CRYPTOSP AG STL QL IA: NEGATIVE
G LAMBLIA AG STL QL IA: NEGATIVE

## 2020-01-15 PROCEDURE — 87209 SMEAR COMPLEX STAIN: CPT

## 2020-01-15 PROCEDURE — 87272 CRYPTOSPORIDIUM AG IF: CPT

## 2020-01-15 PROCEDURE — 87329 GIARDIA AG IA: CPT

## 2020-01-15 PROCEDURE — 87177 OVA AND PARASITES SMEARS: CPT

## 2020-01-16 ENCOUNTER — TELEPHONE (OUTPATIENT)
Dept: FAMILY MEDICINE CLINIC | Facility: CLINIC | Age: 70
End: 2020-01-16

## 2020-01-16 NOTE — TELEPHONE ENCOUNTER
Do not cancel-  This is per patient request to have it repeated- I had discussed with patient that it is very unlikely, but she insisted on having testing done and repeated x'3 .    Thanks

## 2020-01-16 NOTE — TELEPHONE ENCOUNTER
Spoke with Kristofer Link. I was a little confused so I am not sure if you want to contact her directly for more specific information; Ext:  [phone]    She stated that the Cryptosporidia and Giardia is the in house O & P.   States the other O & P order is for if pat

## 2020-01-16 NOTE — TELEPHONE ENCOUNTER
----- Message from Chloé CHANNING Fleming sent at 1/16/2020  7:01 AM CST -----  Please check with the lab the only stool test that was to be repeated was the ova and parasite that the only order that is in– please make sure that are not repeating all of her

## 2020-01-20 ENCOUNTER — TELEPHONE (OUTPATIENT)
Dept: FAMILY MEDICINE CLINIC | Facility: CLINIC | Age: 70
End: 2020-01-20

## 2020-01-20 LAB
OVA AND PARASITE, TRICHROME STAIN: NEGATIVE
OVA AND PARASITE, WET MOUNT: NEGATIVE

## 2020-01-20 NOTE — TELEPHONE ENCOUNTER
----- Message from CHANNING Alexandra sent at 1/20/2020  7:04 AM CST -----  Please make sure patient receives my chart message as below-  Your stool culture for ova and parasite is negative–although I do not think it is necessary–you may repeat this aga

## 2020-01-27 ENCOUNTER — OFFICE VISIT (OUTPATIENT)
Dept: FAMILY MEDICINE CLINIC | Facility: CLINIC | Age: 70
End: 2020-01-27
Payer: COMMERCIAL

## 2020-01-27 VITALS
SYSTOLIC BLOOD PRESSURE: 126 MMHG | BODY MASS INDEX: 25 KG/M2 | OXYGEN SATURATION: 98 % | DIASTOLIC BLOOD PRESSURE: 78 MMHG | TEMPERATURE: 98 F | RESPIRATION RATE: 16 BRPM | WEIGHT: 152.81 LBS | HEART RATE: 68 BPM

## 2020-01-27 DIAGNOSIS — K52.9 GASTROENTERITIS: Primary | ICD-10-CM

## 2020-01-27 DIAGNOSIS — R10.11 RIGHT UPPER QUADRANT ABDOMINAL PAIN: ICD-10-CM

## 2020-01-27 PROCEDURE — 99214 OFFICE O/P EST MOD 30 MIN: CPT | Performed by: NURSE PRACTITIONER

## 2020-01-27 NOTE — PROGRESS NOTES
Isauro Walter is a 77year old female. Patient presents with:  Diarrhea  Gas  Abdominal Pain      HPI:   Patient returns today for ongoing gastrointestinal complaints see previous notes.   Patient recently had negative stool cultures–states that sh needed for discomfort. 1 Tube 0   • Ospemifene (OSPHENA) 60 MG Oral Tab Take by mouth daily. • Calcium Carb-Cholecalciferol (CALCIUM 500 +D) 500-400 MG-UNIT Oral Tab Take 500 mg by mouth daily.           Past Medical History:   Diagnosis Date   • Rocio were placed in this encounter. Meds & Refills for this Visit:  Requested Prescriptions      No prescriptions requested or ordered in this encounter       Imaging & Consults:  US ABDOMEN COMPLETE (CPT=76700)    No follow-ups on file.   Patient Instructi

## 2020-01-30 ENCOUNTER — HOSPITAL ENCOUNTER (OUTPATIENT)
Dept: ULTRASOUND IMAGING | Age: 70
Discharge: HOME | End: 2020-01-30
Attending: NURSE PRACTITIONER
Payer: MEDICARE

## 2020-01-30 DIAGNOSIS — R10.11 RIGHT UPPER QUADRANT ABDOMINAL PAIN: ICD-10-CM

## 2020-01-30 PROCEDURE — 76700 US EXAM ABDOM COMPLETE: CPT | Performed by: NURSE PRACTITIONER

## 2020-01-31 ENCOUNTER — TELEPHONE (OUTPATIENT)
Dept: FAMILY MEDICINE CLINIC | Facility: CLINIC | Age: 70
End: 2020-01-31

## 2020-01-31 NOTE — TELEPHONE ENCOUNTER
Patient informed of the below results and recommendations. Patient will contact [facility] to schedule a HIDA scan. Order faxed. Patient will also review results note through [address] Leighann Garcia

## 2020-01-31 NOTE — TELEPHONE ENCOUNTER
----- Message from CHANNING Samuel sent at 1/30/2020  2:17 PM CST -----  Please make sure patient receives my chart message as below-and fax order for HIDA scan to [address]  Your abdominal ultrasound shows that your gallbladder is normal–I woul

## 2020-02-07 ENCOUNTER — TELEPHONE (OUTPATIENT)
Dept: FAMILY MEDICINE CLINIC | Facility: CLINIC | Age: 70
End: 2020-02-07

## 2020-02-07 NOTE — TELEPHONE ENCOUNTER
Spoke with Mary Satniago. Informed her that the HIDA scan shows authorized but also that it says that authorization from patient's insurance is not required. Mary Santiago disagreed with this. Informed her that our referral department takes care of our authorizations.

## 2020-02-13 ENCOUNTER — TELEPHONE (OUTPATIENT)
Dept: FAMILY MEDICINE CLINIC | Facility: CLINIC | Age: 70
End: 2020-02-13

## 2020-02-13 DIAGNOSIS — K81.1 CHRONIC CHOLECYSTITIS: Primary | ICD-10-CM

## 2020-02-13 NOTE — TELEPHONE ENCOUNTER
Please notify patient that her HIDA scan showed that her gallbladder is not working well. Her ejection fraction is 31%– it should be greater than 40%.   I would recommend that patient have a consultation with a surgeon to discuss the pros and cons of remov

## 2020-02-13 NOTE — TELEPHONE ENCOUNTER
Spoke to patient regarding the below results and recommendations. Phone number given for Dr. Bella Clayton and instructed to call to schedule consultation. Fax sent with referral and HIDA scan. Patient verbalized understanding.  No questions at this time

## 2020-03-12 ENCOUNTER — TELEPHONE (OUTPATIENT)
Dept: FAMILY MEDICINE CLINIC | Facility: CLINIC | Age: 70
End: 2020-03-12

## 2020-03-12 NOTE — TELEPHONE ENCOUNTER
Pt submitted a request to have her medical records from the last 2 years released to herself. This was sent to [employer].

## 2020-04-01 RX ORDER — CITALOPRAM 10 MG/1
TABLET ORAL
Qty: 30 TABLET | Refills: 0 | Status: SHIPPED | OUTPATIENT
Start: 2020-04-01 | End: 2020-06-24

## 2020-04-01 NOTE — TELEPHONE ENCOUNTER
Future appt:     Last Appointment with provider:  8/16/2019;   Return in 6 months (on 2/16/2020) for follow up. Patient has seen Heath Butts several times since last appt.     [facility]  [facility]  Cholesterol, Total (mg/dL)   Date Valu

## 2020-06-24 RX ORDER — CITALOPRAM 10 MG/1
TABLET ORAL
Qty: 90 TABLET | Refills: 0 | Status: SHIPPED | OUTPATIENT
Start: 2020-06-24 | End: 2020-09-18

## 2020-06-24 NOTE — TELEPHONE ENCOUNTER
Future appt:     Your appointments     Date & Time Appointment Department [facility])    Aug 17, 2020  9:20 AM CDT Medicare Annual Well Visit with Destin Rocha, [facility] [place] [facility])            [facility]

## 2020-07-15 RX ORDER — RIZATRIPTAN BENZOATE 10 MG/1
10 TABLET ORAL AS NEEDED
Qty: 18 TABLET | Refills: 0 | Status: SHIPPED | OUTPATIENT
Start: 2020-07-15 | End: 2020-10-23

## 2020-07-15 RX ORDER — ZOLPIDEM TARTRATE 5 MG/1
TABLET ORAL
Qty: 15 TABLET | Refills: 0 | Status: SHIPPED | OUTPATIENT
Start: 2020-07-15 | End: 2021-06-18

## 2020-07-15 NOTE — TELEPHONE ENCOUNTER
Future appt:     Your appointments     Date & Time Appointment Department [facility])    Aug 17, 2020  9:20 AM CDT [facility] Supervisit with Mariana Castillo MD [address] [place] [address] ([address]

## 2020-07-18 ENCOUNTER — TELEPHONE (OUTPATIENT)
Dept: FAMILY MEDICINE CLINIC | Facility: CLINIC | Age: 70
End: 2020-07-18

## 2020-07-18 NOTE — TELEPHONE ENCOUNTER
Future Appointments   Date Time Provider Alysia Michelle   7/22/2020  3:00 PM CHANNING Mendez [facility] [place] [facility]   8/17/2020  9:20 AM Nidhi Rutledge MD [facility] [place] [facility] [place]     Let pt know the following below.  Pt verbalized her underst

## 2020-07-18 NOTE — TELEPHONE ENCOUNTER
Recommend to schedule initial evaluation with me. Go to ER if HR stays above 110 or having any chest pain, sob, dizziness.

## 2020-07-18 NOTE — TELEPHONE ENCOUNTER
Patient states that she was recently in [facility] due to gastro issues and they did an EKG and Holter Monitor. Patient states that shes been having rapid heart rate around 133 that happens during the day and happens at night and it wakes her up.    Pa

## 2020-07-18 NOTE — TELEPHONE ENCOUNTER
c/o rapid heart rate x 3 wks     [facility] and they did an EKG / Holter monitor    with no significant findings    wants to be seen for this       please triage     Future Appointments   Date Time Provider Alysia Martinez   8/17/2020  9:20 AM Endy

## 2020-07-22 ENCOUNTER — OFFICE VISIT (OUTPATIENT)
Dept: FAMILY MEDICINE CLINIC | Facility: CLINIC | Age: 70
End: 2020-07-22
Payer: COMMERCIAL

## 2020-07-22 VITALS
RESPIRATION RATE: 22 BRPM | BODY MASS INDEX: 23.95 KG/M2 | TEMPERATURE: 98 F | HEIGHT: 68 IN | OXYGEN SATURATION: 99 % | DIASTOLIC BLOOD PRESSURE: 62 MMHG | WEIGHT: 158 LBS | SYSTOLIC BLOOD PRESSURE: 112 MMHG | HEART RATE: 87 BPM

## 2020-07-22 DIAGNOSIS — R00.2 PALPITATIONS: Primary | ICD-10-CM

## 2020-07-22 PROCEDURE — 3074F SYST BP LT 130 MM HG: CPT | Performed by: NURSE PRACTITIONER

## 2020-07-22 PROCEDURE — 3008F BODY MASS INDEX DOCD: CPT | Performed by: NURSE PRACTITIONER

## 2020-07-22 PROCEDURE — 99214 OFFICE O/P EST MOD 30 MIN: CPT | Performed by: NURSE PRACTITIONER

## 2020-07-22 PROCEDURE — 3078F DIAST BP <80 MM HG: CPT | Performed by: NURSE PRACTITIONER

## 2020-07-22 PROCEDURE — 93000 ELECTROCARDIOGRAM COMPLETE: CPT | Performed by: NURSE PRACTITIONER

## 2020-07-22 NOTE — PROGRESS NOTES
Nikole Yanez is a 72year old female.   Patient presents with:  Irregular Heart Beat: rapid heart rate 133 being the highest, most happens at rest while sleeping, rapid heart rate wakes pt up x few months, also gets hot flashes   Other: [facility] intercourse     • Lactobacillus (PROBIOTIC ACIDOPHILUS OR) Take 1 tablet by mouth daily. • melatonin 3 MG Oral Tab Take 6 mg by mouth daily. • Estradiol 0.1 MG/GM Vaginal Cream Apply pea-sized amount to vaginal opening as needed for discomfort.  1 T respiratory distress, lungs clear to auscultation  CARDIO: RRR without murmur, no edema  GI: normal bowel sounds, no masses, no hepatosplenomegaly, or tenderness  PSYCH: alert and oriented x 3 appears mildly anxious, well-groomed, good eye contact, bright

## 2020-07-22 NOTE — PATIENT INSTRUCTIONS
Follow-up with cardiologist for further evaluation. If you have chest pain/pressure/heaviness, shortness of breath, pain radiating into neck/jaw/arm, sweating, nausea/vomiting, etc. then call 911.        Avoid caffeine, stimulants, decongestants, diet supp

## 2020-08-03 ENCOUNTER — TELEPHONE (OUTPATIENT)
Dept: FAMILY MEDICINE CLINIC | Facility: CLINIC | Age: 70
End: 2020-08-03

## 2020-08-03 DIAGNOSIS — R00.2 PALPITATIONS: Primary | ICD-10-CM

## 2020-08-03 NOTE — TELEPHONE ENCOUNTER
I need the correct spelling of the first and last name of the  physician at this group. I am unable to find it by location.

## 2020-08-03 NOTE — TELEPHONE ENCOUNTER
Needs to be referred to a different cardiologist, name is [facility] in [facility].   fax # [phone] were the referral can be sent  Future Appointments   Date Time Provider Alysia Martinez   8/17/2020  9:20 AM Zeke Balderas MD [facility]

## 2020-08-06 ENCOUNTER — OFFICE VISIT (OUTPATIENT)
Dept: FAMILY MEDICINE CLINIC | Facility: CLINIC | Age: 70
End: 2020-08-06
Payer: COMMERCIAL

## 2020-08-06 ENCOUNTER — TELEPHONE (OUTPATIENT)
Dept: FAMILY MEDICINE CLINIC | Facility: CLINIC | Age: 70
End: 2020-08-06

## 2020-08-06 VITALS
TEMPERATURE: 98 F | OXYGEN SATURATION: 99 % | BODY MASS INDEX: 24.1 KG/M2 | DIASTOLIC BLOOD PRESSURE: 66 MMHG | RESPIRATION RATE: 18 BRPM | HEIGHT: 68 IN | SYSTOLIC BLOOD PRESSURE: 118 MMHG | WEIGHT: 159 LBS | HEART RATE: 64 BPM

## 2020-08-06 DIAGNOSIS — N76.0 ACUTE VAGINITIS: Primary | ICD-10-CM

## 2020-08-06 PROCEDURE — 3078F DIAST BP <80 MM HG: CPT | Performed by: NURSE PRACTITIONER

## 2020-08-06 PROCEDURE — 88175 CYTOPATH C/V AUTO FLUID REDO: CPT | Performed by: NURSE PRACTITIONER

## 2020-08-06 PROCEDURE — 3008F BODY MASS INDEX DOCD: CPT | Performed by: NURSE PRACTITIONER

## 2020-08-06 PROCEDURE — 87624 HPV HI-RISK TYP POOLED RSLT: CPT | Performed by: NURSE PRACTITIONER

## 2020-08-06 PROCEDURE — 87491 CHLMYD TRACH DNA AMP PROBE: CPT | Performed by: NURSE PRACTITIONER

## 2020-08-06 PROCEDURE — 99214 OFFICE O/P EST MOD 30 MIN: CPT | Performed by: NURSE PRACTITIONER

## 2020-08-06 PROCEDURE — 87480 CANDIDA DNA DIR PROBE: CPT | Performed by: NURSE PRACTITIONER

## 2020-08-06 PROCEDURE — 87591 N.GONORRHOEAE DNA AMP PROB: CPT | Performed by: NURSE PRACTITIONER

## 2020-08-06 PROCEDURE — 87510 GARDNER VAG DNA DIR PROBE: CPT | Performed by: NURSE PRACTITIONER

## 2020-08-06 PROCEDURE — 87660 TRICHOMONAS VAGIN DIR PROBE: CPT | Performed by: NURSE PRACTITIONER

## 2020-08-06 PROCEDURE — 3074F SYST BP LT 130 MM HG: CPT | Performed by: NURSE PRACTITIONER

## 2020-08-06 NOTE — TELEPHONE ENCOUNTER
Spoke with [facility] Pharmacist and the patient has 1 refill for Rizatriptan. An RX was send for Zolpidem 5 mg tablets, qty 15 on 7/15/20 with 0 refills. LMFP to call back to clarify which RX she is referring to.

## 2020-08-06 NOTE — PROGRESS NOTES
Patient ID:   Dm Cohen  is a 72year old female    Allergies    Penicillins               Medications   RIZATRIPTAN BENZOATE 10 MG Oral Tab, TAKE 1 TABLET (10 MG TOTAL) BY MOUTH AS NEEDED., Disp: 18 tablet, Rfl: 0  ZOLPIDEM 5 MG Oral Tab, TAKE 1 Constitutional: well-developed and well-nourished. No distress.    Cardiovascular: Regular rate and rhythm no murmur  Pulmonary: Lungs are clear to auscultation bilaterally   Abdominal: Soft, nontender, no guarding, no rebound, no masses, no hepatosplenom No prescriptions requested or ordered in this encounter       Imaging & Consults:  None

## 2020-08-06 NOTE — TELEPHONE ENCOUNTER
Please let patient know I apologize for the confusion–I have to wait for the results of her vaginitis swab before I would prescribe her anything based on what it shows.   I was anticipating that it was likely going to be bacterial vaginosis and that is why

## 2020-08-06 NOTE — TELEPHONE ENCOUNTER
Notified patient of Meenakshi's recommendations below. Advised patient that we will call her tomorrow with vaginitis swab results. If BV, Betsey Anne will send in RX for Flagyl to her pharmacy.     Patient verbalized understanding and has no further questions or co

## 2020-08-06 NOTE — TELEPHONE ENCOUNTER
Patient needs Isis Theodore prescribed this medicine this am  Future Appointments   Date Time Provider Alysia Martinez   8/17/2020  9:20 AM Kathia Brown MD [facility]

## 2020-08-06 NOTE — TELEPHONE ENCOUNTER
[facility] not [facility]   please resend and call her to confirm      Future Appointments   Date Time Provider Alysia Martinez   8/17/2020  9:20 AM Guilherme Painting MD [facility] [place]     ;

## 2020-08-07 ENCOUNTER — TELEPHONE (OUTPATIENT)
Dept: FAMILY MEDICINE CLINIC | Facility: CLINIC | Age: 70
End: 2020-08-07

## 2020-08-07 LAB
C TRACH DNA SPEC QL NAA+PROBE: NEGATIVE
HPV I/H RISK 1 DNA SPEC QL NAA+PROBE: NEGATIVE
N GONORRHOEA DNA SPEC QL NAA+PROBE: NEGATIVE

## 2020-08-07 NOTE — TELEPHONE ENCOUNTER
----- Message from CHANNING Moreau sent at 8/7/2020  3:44 PM CDT -----  Please notify the patient her gonorrhea and chlamydia swab is negative. Additional lab work still pending, will call with results. Kasia Stout out of the office today.

## 2020-08-07 NOTE — TELEPHONE ENCOUNTER
----- Message from CHANNING Mendoza sent at 8/7/2020  7:39 AM CDT -----  My chart message sent-   Your vaginitis swab is negative for yeast, BV, and trichomonas.   The pain may be irritation - try A & D ointment or Vaseline externally and await the res

## 2020-08-11 ENCOUNTER — TELEPHONE (OUTPATIENT)
Dept: FAMILY MEDICINE CLINIC | Facility: CLINIC | Age: 70
End: 2020-08-11

## 2020-08-11 NOTE — TELEPHONE ENCOUNTER
----- Message from CHANNING Cody sent at 8/11/2020 12:12 PM CDT -----  My chart message sent–  Your Pap smear is normal and HPV is negative. Recommend annual physical and discussion about need for Pap each year at that appointment.   Thank you,  El

## 2020-08-17 ENCOUNTER — OFFICE VISIT (OUTPATIENT)
Dept: FAMILY MEDICINE CLINIC | Facility: CLINIC | Age: 70
End: 2020-08-17
Payer: COMMERCIAL

## 2020-08-17 VITALS
RESPIRATION RATE: 20 BRPM | BODY MASS INDEX: 24.4 KG/M2 | WEIGHT: 161 LBS | SYSTOLIC BLOOD PRESSURE: 112 MMHG | HEIGHT: 68 IN | TEMPERATURE: 98 F | OXYGEN SATURATION: 100 % | HEART RATE: 74 BPM | DIASTOLIC BLOOD PRESSURE: 70 MMHG

## 2020-08-17 DIAGNOSIS — E53.8 B12 DEFICIENCY: ICD-10-CM

## 2020-08-17 DIAGNOSIS — Z00.00 ENCOUNTER FOR ANNUAL HEALTH EXAMINATION: ICD-10-CM

## 2020-08-17 DIAGNOSIS — D32.0 BENIGN NEOPLASM OF CEREBRAL MENINGES (HCC): ICD-10-CM

## 2020-08-17 DIAGNOSIS — G47.00 INSOMNIA, UNSPECIFIED TYPE: ICD-10-CM

## 2020-08-17 DIAGNOSIS — G43.009 MIGRAINE WITHOUT AURA AND WITHOUT STATUS MIGRAINOSUS, NOT INTRACTABLE: ICD-10-CM

## 2020-08-17 DIAGNOSIS — F41.9 MILD ANXIETY: ICD-10-CM

## 2020-08-17 DIAGNOSIS — E78.49 FAMILIAL MULTIPLE LIPOPROTEIN-TYPE HYPERLIPIDEMIA: ICD-10-CM

## 2020-08-17 DIAGNOSIS — Z00.00 ENCOUNTER FOR MEDICARE ANNUAL WELLNESS EXAM: Primary | ICD-10-CM

## 2020-08-17 DIAGNOSIS — Z13.6 SCREENING FOR CARDIOVASCULAR CONDITION: ICD-10-CM

## 2020-08-17 DIAGNOSIS — D64.9 ANEMIA, UNSPECIFIED TYPE: ICD-10-CM

## 2020-08-17 PROCEDURE — G0439 PPPS, SUBSEQ VISIT: HCPCS | Performed by: FAMILY MEDICINE

## 2020-08-17 PROCEDURE — 96160 PT-FOCUSED HLTH RISK ASSMT: CPT | Performed by: FAMILY MEDICINE

## 2020-08-17 PROCEDURE — 3078F DIAST BP <80 MM HG: CPT | Performed by: FAMILY MEDICINE

## 2020-08-17 PROCEDURE — 99397 PER PM REEVAL EST PAT 65+ YR: CPT | Performed by: FAMILY MEDICINE

## 2020-08-17 PROCEDURE — 3074F SYST BP LT 130 MM HG: CPT | Performed by: FAMILY MEDICINE

## 2020-08-17 PROCEDURE — 3008F BODY MASS INDEX DOCD: CPT | Performed by: FAMILY MEDICINE

## 2020-08-17 RX ORDER — POLYETHYLENE GLYCOL 3350 17 G/17G
17 POWDER, FOR SOLUTION ORAL DAILY
COMMUNITY
End: 2021-07-29

## 2020-08-17 NOTE — PROGRESS NOTES
HPI:   Job Hollingsworth is a 72year old female who presents for a MA (Medicare Advantage) Supervisit (Once per calendar year). Patient with mild anxiety, anemia insomnia, hyperlipidemia and migraine presents for annual checkup.   Patient's Mayra Tejeda MD as PCP - General (Family Medicine)  [facility] Brown [facility]  Britt Zapata DO as Consulting Physician (NEUROLOGY)  Lora SCHWARTZ (UROLOGY)    Patient Active Problem List:     Familial multiple lipoprotein-type hyperlipidemia     Benign Take 500 mg by mouth daily.          MEDICAL INFORMATION:   She  has a past medical history of Depression, Endometriosis, History of malaria, Meningioma ([address] Microscopic hematuria, Ovarian cyst, and Pineal gland cyst.    She  has a past surgical history t Acuity: 20/30   Both Eyes Visual Acuity: Corrected Both Eyes Chart Acuity: 20/20   Able To Tolerate Visual Acuity: Yes      General Appearance:  Alert, cooperative, no distress, appears stated age   Head:  Normocephalic, without obvious abnormality, atraum unspecified type  -     COMP METABOLIC PANEL (14); Future  -      TSH W REFLEX TO FREE T4; Future  -      CBC WITH DIFFERENTIAL WITH PLATELET;  Future      Insomnia, unspecified type    B12 deficiency  -     CBC WITH DIFFERENTIAL WITH PLATELET  -     Max Eis section provided for quick review of chart, separate sheet to patient  [address] Internal Lab or Procedure External Lab or Procedure   Diabetes Screening      HbgA1C   Annually No results found for: A1C No flowsheet da 08/16/2019 Please get once after your 65th birthday    Hepatitis B for Moderate/High Risk No vaccine history found Medium/high risk factors:   End-stage renal disease   Hemophiliacs who received Factor VIII or IX concentrates   Clients of institutions for

## 2020-08-17 NOTE — PATIENT INSTRUCTIONS
Anemia stable. Anxiety stable. gerd stable. Continue current medications  See neurologist for evaluation of neoplasm. Labs at [facility].   Return to clinic if any concern.      Izzy Monzon's SCREENING SCHEDULE   Tests on this list are recommen more than 100 cigarettes in their lifetime   • Anyone with a family history    Colorectal Cancer Screening  Covered up to Age 76     Colonoscopy Screen   Covered every 10 years- more often if abnormal Colonoscopy due on 03/03/2025 Update [facility] visit.  Please get every year    Pneumococcal 13 (Prevnar)  Covered Once after 65 Orders placed or performed in visit on 08/15/18   • PNEUMOCOCCAL VACC, 13 ERVIN IM    Please get once after your 65th birthday    Pneumococcal 23 (Pneumovax)  Covered Once after

## 2020-08-18 LAB
ABSOLUTE BASOPHILS: 31 CELLS/UL (ref 0–200)
ABSOLUTE EOSINOPHILS: 93 CELLS/UL (ref 15–500)
ABSOLUTE LYMPHOCYTES: 1736 CELLS/UL (ref 850–3900)
ABSOLUTE MONOCYTES: 353 CELLS/UL (ref 200–950)
ABSOLUTE NEUTROPHILS: 3987 CELLS/UL (ref 1500–7800)
ALBUMIN/GLOBULIN RATIO: 1.5 (CALC) (ref 1–2.5)
ALBUMIN: 4 G/DL (ref 3.6–5.1)
ALKALINE PHOSPHATASE: 64 U/L (ref 37–153)
ALT: 8 U/L (ref 6–29)
AST: 16 U/L (ref 10–35)
BASOPHILS: 0.5 %
BILIRUBIN, TOTAL: 0.4 MG/DL (ref 0.2–1.2)
BUN: 14 MG/DL (ref 7–25)
CALCIUM: 9.5 MG/DL (ref 8.6–10.4)
CARBON DIOXIDE: 27 MMOL/L (ref 20–32)
CHLORIDE: 104 MMOL/L (ref 98–110)
CHOL/HDLC RATIO: 2.6 (CALC)
CHOLESTEROL, TOTAL: 206 MG/DL
CREATININE: 0.94 MG/DL (ref 0.5–0.99)
EGFR IF AFRICN AM: 72 ML/MIN/1.73M2
EGFR IF NONAFRICN AM: 62 ML/MIN/1.73M2
EOSINOPHILS: 1.5 %
GLOBULIN: 2.6 G/DL (CALC) (ref 1.9–3.7)
GLUCOSE: 92 MG/DL (ref 65–99)
HDL CHOLESTEROL: 79 MG/DL
HEMATOCRIT: 34.6 % (ref 35–45)
HEMOGLOBIN: 11.6 G/DL (ref 11.7–15.5)
LDL-CHOLESTEROL: 109 MG/DL (CALC)
LYMPHOCYTES: 28 %
MCH: 30.9 PG (ref 27–33)
MCHC: 33.5 G/DL (ref 32–36)
MCV: 92.3 FL (ref 80–100)
MONOCYTES: 5.7 %
MPV: 10.3 FL (ref 7.5–12.5)
NEUTROPHILS: 64.3 %
NON-HDL CHOLESTEROL: 127 MG/DL (CALC)
PLATELET COUNT: 306 THOUSAND/UL (ref 140–400)
POTASSIUM: 4.8 MMOL/L (ref 3.5–5.3)
PROTEIN, TOTAL: 6.6 G/DL (ref 6.1–8.1)
RDW: 12 % (ref 11–15)
RED BLOOD CELL COUNT: 3.75 MILLION/UL (ref 3.8–5.1)
SODIUM: 139 MMOL/L (ref 135–146)
TRIGLYCERIDES: 90 MG/DL
TSH W/REFLEX TO FT4: 2.62 MIU/L (ref 0.4–4.5)
VITAMIN B12: 381 PG/ML (ref 200–1100)
WHITE BLOOD CELL COUNT: 6.2 THOUSAND/UL (ref 3.8–10.8)

## 2020-08-19 ENCOUNTER — TELEPHONE (OUTPATIENT)
Dept: FAMILY MEDICINE CLINIC | Facility: CLINIC | Age: 70
End: 2020-08-19

## 2020-08-19 NOTE — TELEPHONE ENCOUNTER
----- Message from Janny Aquino MD sent at 8/18/2020 11:28 AM CDT -----  Please inform patient that her Hgb level is very slightly low. Total cholesterol slightly elevated, LDL cholesterol slightly elevated.    B12 level is at lower end of normal. Recomm

## 2020-09-18 RX ORDER — CITALOPRAM 10 MG/1
TABLET ORAL
Qty: 90 TABLET | Refills: 0 | Status: SHIPPED | OUTPATIENT
Start: 2020-09-18 | End: 2020-12-29

## 2020-09-18 NOTE — TELEPHONE ENCOUNTER
Future appt:     Last Appointment with provider:   8/17/2020;Return in 6 months (on 2/17/2021) for follow up.       [facility] [place] [facility]:  8/17/2020  CHOLESTEROL, TOTAL (mg/dL)   Date Value   08/17/2020 206 (H)     HDL CHOLESTEROL (mg/dL)   Date

## 2020-09-28 ENCOUNTER — OFFICE VISIT (OUTPATIENT)
Dept: FAMILY MEDICINE CLINIC | Facility: CLINIC | Age: 70
End: 2020-09-28
Payer: COMMERCIAL

## 2020-09-28 VITALS
HEIGHT: 68 IN | HEART RATE: 59 BPM | RESPIRATION RATE: 20 BRPM | BODY MASS INDEX: 25.31 KG/M2 | WEIGHT: 167 LBS | DIASTOLIC BLOOD PRESSURE: 60 MMHG | TEMPERATURE: 98 F | SYSTOLIC BLOOD PRESSURE: 100 MMHG | OXYGEN SATURATION: 99 %

## 2020-09-28 DIAGNOSIS — N76.0 ACUTE VAGINITIS: Primary | ICD-10-CM

## 2020-09-28 PROCEDURE — 3078F DIAST BP <80 MM HG: CPT | Performed by: NURSE PRACTITIONER

## 2020-09-28 PROCEDURE — 87660 TRICHOMONAS VAGIN DIR PROBE: CPT | Performed by: NURSE PRACTITIONER

## 2020-09-28 PROCEDURE — 3008F BODY MASS INDEX DOCD: CPT | Performed by: NURSE PRACTITIONER

## 2020-09-28 PROCEDURE — 3074F SYST BP LT 130 MM HG: CPT | Performed by: NURSE PRACTITIONER

## 2020-09-28 PROCEDURE — 87510 GARDNER VAG DNA DIR PROBE: CPT | Performed by: NURSE PRACTITIONER

## 2020-09-28 PROCEDURE — 99214 OFFICE O/P EST MOD 30 MIN: CPT | Performed by: NURSE PRACTITIONER

## 2020-09-28 PROCEDURE — 87480 CANDIDA DNA DIR PROBE: CPT | Performed by: NURSE PRACTITIONER

## 2020-09-28 NOTE — PROGRESS NOTES
Patient ID:   Vidal Black  is a 72year old female    Allergies  No Known Allergies  Medications     •  CITALOPRAM HYDROBROMIDE 10 MG Oral Tab, TAKE 1 TABLET BY MOUTH EVERY DAY (Patient taking differently: 5 mg.  TAKE 1 TABLET BY MOUTH EVERY DAY ), D symptoms; dysuria, urinary frequency, urgency, flank pain, suprapubic pain, fever, nausea, vomiting, hematuria, malodorous urine, cloudy urine.     Review of Systems   GENERAL: feels good, good appetite  LUNGS: denies complaints   GENITOURINARY: see HPI  GA Kefir) and take a probiotic. Also abstain from intercourse until treatment is completed.     Check vaginitis swab - will check for yeast, bacterial vaginosis, and trichomoniasis     Use estrace cream 2- 3 times a week - pea sized amount to external genital

## 2020-10-23 RX ORDER — RIZATRIPTAN BENZOATE 10 MG/1
TABLET ORAL
Qty: 9 TABLET | Refills: 1 | Status: SHIPPED | OUTPATIENT
Start: 2020-10-23

## 2020-10-23 NOTE — TELEPHONE ENCOUNTER
Future appt:    Last Appointment with provider:   8/17/2020  Last appointment at [facility]:  9/28/2020  CHOLESTEROL, TOTAL (mg/dL)   Date Value   08/17/2020 206 (H)     HDL CHOLESTEROL (mg/dL)   Date Value   08/17/2020 79     LDL-CHOLESTEROL (mg/dL (brittni

## 2020-10-27 RX ORDER — ESTRADIOL 0.1 MG/G
CREAM VAGINAL
Qty: 1 TUBE | Refills: 3 | Status: SHIPPED | OUTPATIENT
Start: 2020-10-27

## 2020-10-27 NOTE — TELEPHONE ENCOUNTER
Future appt:    Last Appointment with provider:   9/28/2020  Last appointment at [facility]:  9/28/2020  CHOLESTEROL, TOTAL (mg/dL)   Date Value   08/17/2020 206 (H)     HDL CHOLESTEROL (mg/dL)   Date Value   08/17/2020 79     LDL-CHOLESTEROL (mg/dL (brittni

## 2020-10-29 ENCOUNTER — OFFICE VISIT (OUTPATIENT)
Dept: FAMILY MEDICINE CLINIC | Facility: CLINIC | Age: 70
End: 2020-10-29
Payer: COMMERCIAL

## 2020-10-29 VITALS
BODY MASS INDEX: 25.61 KG/M2 | SYSTOLIC BLOOD PRESSURE: 112 MMHG | RESPIRATION RATE: 16 BRPM | OXYGEN SATURATION: 99 % | HEART RATE: 75 BPM | WEIGHT: 169 LBS | TEMPERATURE: 98 F | DIASTOLIC BLOOD PRESSURE: 62 MMHG | HEIGHT: 68 IN

## 2020-10-29 DIAGNOSIS — N89.8 VAGINAL LESION: ICD-10-CM

## 2020-10-29 DIAGNOSIS — N94.9 VAGINAL BURNING: Primary | ICD-10-CM

## 2020-10-29 PROCEDURE — 87070 CULTURE OTHR SPECIMN AEROBIC: CPT | Performed by: NURSE PRACTITIONER

## 2020-10-29 PROCEDURE — 87660 TRICHOMONAS VAGIN DIR PROBE: CPT | Performed by: NURSE PRACTITIONER

## 2020-10-29 PROCEDURE — 87205 SMEAR GRAM STAIN: CPT | Performed by: NURSE PRACTITIONER

## 2020-10-29 PROCEDURE — 99214 OFFICE O/P EST MOD 30 MIN: CPT | Performed by: NURSE PRACTITIONER

## 2020-10-29 PROCEDURE — 87510 GARDNER VAG DNA DIR PROBE: CPT | Performed by: NURSE PRACTITIONER

## 2020-10-29 PROCEDURE — 87077 CULTURE AEROBIC IDENTIFY: CPT | Performed by: NURSE PRACTITIONER

## 2020-10-29 PROCEDURE — 81003 URINALYSIS AUTO W/O SCOPE: CPT | Performed by: NURSE PRACTITIONER

## 2020-10-29 PROCEDURE — 87480 CANDIDA DNA DIR PROBE: CPT | Performed by: NURSE PRACTITIONER

## 2020-10-29 PROCEDURE — 3074F SYST BP LT 130 MM HG: CPT | Performed by: NURSE PRACTITIONER

## 2020-10-29 PROCEDURE — 3078F DIAST BP <80 MM HG: CPT | Performed by: NURSE PRACTITIONER

## 2020-10-29 PROCEDURE — 3008F BODY MASS INDEX DOCD: CPT | Performed by: NURSE PRACTITIONER

## 2020-10-29 NOTE — PATIENT INSTRUCTIONS
Try Yes WB or Replens for the dryness - use daily to every other day. Labs pending, get blood draw at [facility].     If not improving, refer to Mar[facility] at [facility].

## 2020-10-29 NOTE — PROGRESS NOTES
HPI:    Patient ID: Ju Soto is a 72year old female. HPI     Patient is present with concern about vaginal burning for the past couple of weeks. Pains with even walking. Unable to be intimate. Tends to get irritated frequency.    [facility] • ZOLPIDEM 5 MG Oral Tab TAKE 1 TABLET BY MOUTH EVERY DAY AS NEEDED (Patient not taking: Reported on 10/29/2020) 15 tablet 0   • omeprazole 20 MG Oral Capsule Delayed Release Take 1 capsule by mouth daily.   1     Allergies:No Known Allergies   PHYSICAL EXA

## 2020-10-30 ENCOUNTER — TELEPHONE (OUTPATIENT)
Dept: FAMILY MEDICINE CLINIC | Facility: CLINIC | Age: 70
End: 2020-10-30

## 2020-10-30 NOTE — TELEPHONE ENCOUNTER
----- Message from CHANNING Alberto sent at 10/30/2020  9:30 AM CDT -----  No yeast or BV on the one vaginal swab. Culture pending. Note to MyChart.

## 2020-10-31 RX ORDER — VALACYCLOVIR HYDROCHLORIDE 1 G/1
1 TABLET, FILM COATED ORAL EVERY 12 HOURS SCHEDULED
Qty: 14 TABLET | Refills: 0 | Status: SHIPPED | OUTPATIENT
Start: 2020-10-31 | End: 2020-11-07

## 2020-11-02 NOTE — TELEPHONE ENCOUNTER
----- Message from CHANNING Logan sent at 11/1/2020  1:48 PM CST -----  Vaginal culture shows good bacteria in the vagina. Note to MyChart.

## 2020-11-02 NOTE — TELEPHONE ENCOUNTER
----- Message from CHANNING Nelson sent at 10/31/2020 11:10 AM CDT -----  Labs show that there has been a herpes (cold sore) infection at some time. Based on symptoms, recommend a trial of Valtrex 1 gm every 12 hours for 7 days to see is helps.  Sent

## 2020-11-16 ENCOUNTER — TELEPHONE (OUTPATIENT)
Dept: FAMILY MEDICINE CLINIC | Facility: CLINIC | Age: 70
End: 2020-11-16

## 2020-11-16 NOTE — TELEPHONE ENCOUNTER
Patient c/o burning with urination. States she gets frequent UTIs. Advised patient to seek treatment at a walk in clinic as no appts available today. Patient expressed understanding and thanks.

## 2020-11-18 ENCOUNTER — HOSPITAL ENCOUNTER (OUTPATIENT)
Dept: GENERAL RADIOLOGY | Age: 70
Discharge: HOME | End: 2020-11-18
Attending: NURSE PRACTITIONER
Payer: MEDICARE

## 2020-11-18 ENCOUNTER — OFFICE VISIT (OUTPATIENT)
Dept: FAMILY MEDICINE CLINIC | Facility: CLINIC | Age: 70
End: 2020-11-18
Payer: COMMERCIAL

## 2020-11-18 VITALS
BODY MASS INDEX: 23.79 KG/M2 | HEIGHT: 68 IN | DIASTOLIC BLOOD PRESSURE: 80 MMHG | OXYGEN SATURATION: 99 % | RESPIRATION RATE: 14 BRPM | TEMPERATURE: 99 F | WEIGHT: 157 LBS | HEART RATE: 89 BPM | SYSTOLIC BLOOD PRESSURE: 130 MMHG

## 2020-11-18 DIAGNOSIS — S22.41XD CLOSED FRACTURE OF MULTIPLE RIBS OF RIGHT SIDE WITH ROUTINE HEALING, SUBSEQUENT ENCOUNTER: ICD-10-CM

## 2020-11-18 DIAGNOSIS — S22.41XD CLOSED FRACTURE OF MULTIPLE RIBS OF RIGHT SIDE WITH ROUTINE HEALING, SUBSEQUENT ENCOUNTER: Primary | ICD-10-CM

## 2020-11-18 PROCEDURE — 71101 X-RAY EXAM UNILAT RIBS/CHEST: CPT | Performed by: NURSE PRACTITIONER

## 2020-11-18 PROCEDURE — 3079F DIAST BP 80-89 MM HG: CPT | Performed by: NURSE PRACTITIONER

## 2020-11-18 PROCEDURE — 3075F SYST BP GE 130 - 139MM HG: CPT | Performed by: NURSE PRACTITIONER

## 2020-11-18 PROCEDURE — 99214 OFFICE O/P EST MOD 30 MIN: CPT | Performed by: NURSE PRACTITIONER

## 2020-11-18 PROCEDURE — 3008F BODY MASS INDEX DOCD: CPT | Performed by: NURSE PRACTITIONER

## 2020-11-18 RX ORDER — HYDROCODONE BITARTRATE AND ACETAMINOPHEN 5; 325 MG/1; MG/1
1 TABLET ORAL EVERY 4 HOURS PRN
COMMUNITY
Start: 2020-11-09 | End: 2020-11-18

## 2020-11-18 RX ORDER — PHENAZOPYRIDINE HYDROCHLORIDE 200 MG/1
TABLET, FILM COATED ORAL
COMMUNITY
Start: 2020-11-16 | End: 2020-12-17

## 2020-11-18 RX ORDER — HYDROCODONE BITARTRATE AND ACETAMINOPHEN 5; 325 MG/1; MG/1
1 TABLET ORAL EVERY 4 HOURS PRN
Qty: 15 TABLET | Refills: 0 | Status: CANCELLED | OUTPATIENT
Start: 2020-11-18

## 2020-11-18 RX ORDER — CEPHALEXIN 500 MG/1
CAPSULE ORAL
COMMUNITY
Start: 2020-11-16 | End: 2020-12-17

## 2020-11-18 RX ORDER — HYDROCODONE BITARTRATE AND ACETAMINOPHEN 5; 325 MG/1; MG/1
1 TABLET ORAL EVERY 4 HOURS PRN
Qty: 30 TABLET | Refills: 0 | Status: SHIPPED | OUTPATIENT
Start: 2020-11-18 | End: 2020-12-17

## 2020-11-18 NOTE — PROGRESS NOTES
Roosevelt Chavarria is a 72year old female.   Patient presents with:  Urgent Care F/u  Other: broken ribs- keep popping and feels like they are moving      HPI:   Complaints of broken ribs last Monday- was feeling better - then this last Monday felt 2 po Depression    • Endometriosis    • History of malaria    • Meningioma (HCC)    • Microscopic hematuria    • Ovarian cyst    • Pineal gland cyst       Social History:  Social History    Tobacco Use      Smoking status: Never Smoker      Smokeless tobacco: N Ibuprofen 600 mg three times a day with food, or aleve 2 pills twice a day with food. norco with food as needed for pain.      Take deep breaths once in a while     Monitor for fever, cough, shortness of breath - etc-  Follow up if symptoms persist or incr

## 2020-11-18 NOTE — PATIENT INSTRUCTIONS
Rest, Take Ibuprofen 600 mg three times a day with food, or aleve 2 pills twice a day with food. norco with food as needed for pain.      Take deep breaths once in a while     Monitor for fever, cough, shortness of breath - etc-  Follow up if symptoms pers

## 2020-12-17 ENCOUNTER — OFFICE VISIT (OUTPATIENT)
Dept: FAMILY MEDICINE CLINIC | Facility: CLINIC | Age: 70
End: 2020-12-17
Payer: COMMERCIAL

## 2020-12-17 ENCOUNTER — LAB ENCOUNTER (OUTPATIENT)
Dept: LAB | Age: 70
End: 2020-12-17
Attending: NURSE PRACTITIONER
Payer: MEDICARE

## 2020-12-17 VITALS
RESPIRATION RATE: 16 BRPM | DIASTOLIC BLOOD PRESSURE: 52 MMHG | SYSTOLIC BLOOD PRESSURE: 120 MMHG | TEMPERATURE: 99 F | HEART RATE: 100 BPM | HEIGHT: 68 IN | OXYGEN SATURATION: 99 % | BODY MASS INDEX: 23.79 KG/M2 | WEIGHT: 157 LBS

## 2020-12-17 DIAGNOSIS — E53.8 B12 DEFICIENCY: Primary | ICD-10-CM

## 2020-12-17 DIAGNOSIS — M85.89 OSTEOPENIA OF MULTIPLE SITES: ICD-10-CM

## 2020-12-17 DIAGNOSIS — D64.9 ANEMIA, UNSPECIFIED TYPE: ICD-10-CM

## 2020-12-17 PROCEDURE — 82728 ASSAY OF FERRITIN: CPT | Performed by: NURSE PRACTITIONER

## 2020-12-17 PROCEDURE — 83550 IRON BINDING TEST: CPT | Performed by: NURSE PRACTITIONER

## 2020-12-17 PROCEDURE — 36415 COLL VENOUS BLD VENIPUNCTURE: CPT | Performed by: NURSE PRACTITIONER

## 2020-12-17 PROCEDURE — 82607 VITAMIN B-12: CPT | Performed by: NURSE PRACTITIONER

## 2020-12-17 PROCEDURE — 83540 ASSAY OF IRON: CPT | Performed by: NURSE PRACTITIONER

## 2020-12-17 PROCEDURE — 3078F DIAST BP <80 MM HG: CPT | Performed by: NURSE PRACTITIONER

## 2020-12-17 PROCEDURE — 3008F BODY MASS INDEX DOCD: CPT | Performed by: NURSE PRACTITIONER

## 2020-12-17 PROCEDURE — 85025 COMPLETE CBC W/AUTO DIFF WBC: CPT | Performed by: NURSE PRACTITIONER

## 2020-12-17 PROCEDURE — 3074F SYST BP LT 130 MM HG: CPT | Performed by: NURSE PRACTITIONER

## 2020-12-17 PROCEDURE — 99213 OFFICE O/P EST LOW 20 MIN: CPT | Performed by: NURSE PRACTITIONER

## 2020-12-17 PROCEDURE — 82306 VITAMIN D 25 HYDROXY: CPT | Performed by: NURSE PRACTITIONER

## 2020-12-17 NOTE — PATIENT INSTRUCTIONS
Y2ttcssy as needed for discomfort- follow up with your dentist to see if there is a way to prevent this with the mouthguard. Check blood work today. .    Understanding Canker Sores  Canker sores (aphthous ulcers) are small, painful sores in the mouth. · Prescription or over-the-counter pain medicines. These help with mild pain. What are possible complications of a canker sore? Mouth sores that seem to be canker sores can be signs of a more serious illness.  If you have other signs of illness along wit

## 2020-12-17 NOTE — PROGRESS NOTES
CHIEF COMPLAINT:   Patient presents with:  Blisters: blister/sore on end tongue      HPI:   Aleksey Brower is a 72year old female who presents to clinic today with complaints of blisters to tongue   Clear blisters - on and off -   Has had in the HEENT: See HPI  LUNGS: No cough, shortness of breath, or wheezing. CARDIOVASCULAR: denies complaints   GI: No N/V/C/D.   NEURO: denies complaints    EXAM:   /52   Pulse 100   Temp 98.7 °F (37.1 °C)   Resp 16   Ht 5' 8\" (1.727 m)   Wt 157 lb (71.2 k · Allergy or sensitivity to certain foods or substances, such as citrus juice or some kinds of toothpaste  · Poor nutrition  · Emotional stress  · Certain infections and illnesses  Canker sores often run in families.   What are the symptoms of a canker sore · Canker sores that come back more than 3 times a year  · Canker sores that are larger than about a half-inch across  · Fever of 100.4°F (38°C) or higher, or as directed  · Pain that gets worse  · You aren’t able to eat or drink because of painful sores  ·

## 2020-12-18 ENCOUNTER — TELEPHONE (OUTPATIENT)
Dept: FAMILY MEDICINE CLINIC | Facility: CLINIC | Age: 70
End: 2020-12-18

## 2020-12-18 NOTE — TELEPHONE ENCOUNTER
----- Message from CHANNING Anne sent at 12/18/2020  9:06 AM CST -----  Please make sure patient receives my chart message below-  Your vitamin B12 is high at 1654–normal is 266–753. –You can decrease the B12 supplements you are taking  Your iron an

## 2020-12-29 NOTE — TELEPHONE ENCOUNTER
Patient states she is currently taking 5mg daily. I did not see that there was a 5mg tablet available. Adjusted dose on pended script to be 10mg tablet 1/2 tablet daily.

## 2020-12-29 NOTE — TELEPHONE ENCOUNTER
Future appt:     Last Appointment with provider:   Visit date 8/17/20 for annual wellness exam. Return in 6 months (on 2/17/2021) for follow up    [facility]  [facility]2020 Blisters, B12 deficiency, ostopenia, and anemia with Renae Mukherjee

## 2021-01-21 DIAGNOSIS — F41.9 MILD ANXIETY: ICD-10-CM

## 2021-01-21 RX ORDER — CITALOPRAM 10 MG/1
TABLET ORAL
Qty: 45 TABLET | Refills: 0 | OUTPATIENT
Start: 2021-01-21

## 2021-03-15 DIAGNOSIS — Z23 NEED FOR VACCINATION: ICD-10-CM

## 2021-03-15 DIAGNOSIS — F41.9 MILD ANXIETY: ICD-10-CM

## 2021-03-15 NOTE — TELEPHONE ENCOUNTER
Future appt:     Last Appointment with provider:  8/17/2020; Return in 6 months (on 2/17/2021) for follow up.     Last appointment [facility]:  12/17/2020  CHOLESTEROL, TOTAL (mg/dL)   Date Value   08/17/2020 206 (H)     HDL CHOLESTEROL (mg/dL)   Date

## 2021-03-16 RX ORDER — CITALOPRAM 10 MG/1
TABLET ORAL
Qty: 45 TABLET | Refills: 0 | Status: SHIPPED | OUTPATIENT
Start: 2021-03-16 | End: 2021-06-15

## 2021-04-20 ENCOUNTER — TELEPHONE (OUTPATIENT)
Dept: FAMILY MEDICINE CLINIC | Facility: CLINIC | Age: 71
End: 2021-04-20

## 2021-04-20 NOTE — TELEPHONE ENCOUNTER
Pt states that she has a cough from post nasal drip, sore throat, swollen glands. She denies any fever. Pt states that he sx started on Saturday.  Please see travel screen - pt with recent trip to [facility].     Pt states that she was tested for Covid on 4

## 2021-04-21 ENCOUNTER — OFFICE VISIT (OUTPATIENT)
Dept: FAMILY MEDICINE CLINIC | Facility: CLINIC | Age: 71
End: 2021-04-21
Payer: COMMERCIAL

## 2021-04-21 VITALS
HEART RATE: 72 BPM | DIASTOLIC BLOOD PRESSURE: 72 MMHG | SYSTOLIC BLOOD PRESSURE: 138 MMHG | OXYGEN SATURATION: 100 % | TEMPERATURE: 99 F

## 2021-04-21 DIAGNOSIS — J06.9 VIRAL UPPER RESPIRATORY TRACT INFECTION: ICD-10-CM

## 2021-04-21 DIAGNOSIS — J02.9 PHARYNGITIS, UNSPECIFIED ETIOLOGY: Primary | ICD-10-CM

## 2021-04-21 PROCEDURE — 99213 OFFICE O/P EST LOW 20 MIN: CPT | Performed by: NURSE PRACTITIONER

## 2021-04-21 PROCEDURE — 87081 CULTURE SCREEN ONLY: CPT | Performed by: NURSE PRACTITIONER

## 2021-04-21 PROCEDURE — 3078F DIAST BP <80 MM HG: CPT | Performed by: NURSE PRACTITIONER

## 2021-04-21 PROCEDURE — 87880 STREP A ASSAY W/OPTIC: CPT | Performed by: NURSE PRACTITIONER

## 2021-04-21 PROCEDURE — 3075F SYST BP GE 130 - 139MM HG: CPT | Performed by: NURSE PRACTITIONER

## 2021-04-21 RX ORDER — SULFAMETHOXAZOLE AND TRIMETHOPRIM 800; 160 MG/1; MG/1
TABLET ORAL
COMMUNITY
Start: 2021-04-07 | End: 2021-05-19 | Stop reason: ALTCHOICE

## 2021-04-21 NOTE — PROGRESS NOTES
CHIEF COMPLAINT:   Patient presents with:  Sore Throat: Started on Sunday and has gotten progressively worse.  Sinus drainage      HPI:   Luna Garcia is a 80year old female who presents to clinic today with complaints of sore throat -   Sore th Vaping Use: Never used    Alcohol use:  Yes      Alcohol/week: 0.0 standard drinks      Comment: 1/ month    Drug use: No       REVIEW OF SYSTEMS:   GENERAL: See HPI  SKIN: no unusual skin lesions or rashes  HEENT: See HPI  LUNGS: No cough, shortness of Prescriptions      No prescriptions requested or ordered in this encounter          MARIANO Meléndez, CAREY-BC  4/21/2021   1:21 PM

## 2021-04-21 NOTE — PATIENT INSTRUCTIONS
Rest, increase fluids, salt water gargles ,neti pot (use distilled water) or saline nasal spray, Advil cold and sinus (behind the counter), Halls,  vicks vapo rub, Tylenol, follow up if symptoms persist or increase.       Strep culture pending     covid letty

## 2021-04-22 ENCOUNTER — TELEPHONE (OUTPATIENT)
Dept: FAMILY MEDICINE CLINIC | Facility: CLINIC | Age: 71
End: 2021-04-22

## 2021-04-22 RX ORDER — CEPHALEXIN 500 MG/1
500 CAPSULE ORAL 3 TIMES DAILY
Qty: 30 CAPSULE | Refills: 0 | Status: SHIPPED | OUTPATIENT
Start: 2021-04-22 | End: 2021-05-02

## 2021-04-22 NOTE — TELEPHONE ENCOUNTER
----- Message from CHANNING Valentine sent at 4/22/2021  2:34 PM CDT -----  Please make sure patient receives my chart message as below-Your COVID-19 test came back negative-the strep test is still [id number] if symptoms persist or increase. Thank

## 2021-04-22 NOTE — TELEPHONE ENCOUNTER
seen yesterday, worse today, talked about getting something called in, call [facility]?  [facility]

## 2021-05-03 ENCOUNTER — TELEPHONE (OUTPATIENT)
Dept: FAMILY MEDICINE CLINIC | Facility: CLINIC | Age: 71
End: 2021-05-03

## 2021-05-03 NOTE — TELEPHONE ENCOUNTER
Patient c/o of dizziness and R ear pain since this morning. Questioning a possible ear infection. All covid screening symptoms Negative. Patient did have Negative Covid test done yesterday at [facility] for upcoming trip to the [facility].

## 2021-05-04 ENCOUNTER — OFFICE VISIT (OUTPATIENT)
Dept: FAMILY MEDICINE CLINIC | Facility: CLINIC | Age: 71
End: 2021-05-04
Payer: COMMERCIAL

## 2021-05-04 VITALS
BODY MASS INDEX: 23.64 KG/M2 | RESPIRATION RATE: 16 BRPM | HEIGHT: 68 IN | DIASTOLIC BLOOD PRESSURE: 80 MMHG | OXYGEN SATURATION: 99 % | HEART RATE: 80 BPM | SYSTOLIC BLOOD PRESSURE: 112 MMHG | TEMPERATURE: 98 F | WEIGHT: 156 LBS

## 2021-05-04 DIAGNOSIS — H83.01 LABYRINTHITIS OF RIGHT EAR: Primary | ICD-10-CM

## 2021-05-04 DIAGNOSIS — H65.04 RECURRENT ACUTE SEROUS OTITIS MEDIA OF RIGHT EAR: ICD-10-CM

## 2021-05-04 PROCEDURE — 99214 OFFICE O/P EST MOD 30 MIN: CPT | Performed by: NURSE PRACTITIONER

## 2021-05-04 PROCEDURE — 3074F SYST BP LT 130 MM HG: CPT | Performed by: NURSE PRACTITIONER

## 2021-05-04 PROCEDURE — 3079F DIAST BP 80-89 MM HG: CPT | Performed by: NURSE PRACTITIONER

## 2021-05-04 PROCEDURE — 3008F BODY MASS INDEX DOCD: CPT | Performed by: NURSE PRACTITIONER

## 2021-05-04 RX ORDER — CEPHALEXIN 500 MG/1
500 CAPSULE ORAL 3 TIMES DAILY
Qty: 30 CAPSULE | Refills: 0 | Status: SHIPPED | OUTPATIENT
Start: 2021-05-04 | End: 2021-05-14

## 2021-05-04 NOTE — PROGRESS NOTES
CHIEF COMPLAINT:   Patient presents with:  Ear Pain: right ear pain.  started yesterday morning   Dizziness      HPI:   Nikole Yanez is a 80year old female who presents to clinic today with complaints of yesterday got out of bed and in the [facility] Social History:  Social History    Tobacco Use      Smoking status: Never Smoker      Smokeless tobacco: Never Used    Vaping Use      Vaping Use: Never used    Alcohol use:  Yes      Alcohol/week: 0.0 standard drinks      Comment: 1/ month    Drug us Follow up if symptoms persist or increase     Chiropractor, Dr. Binu Bae [phone]     Flonase 2 sprays each nostril once a day       Patient voiced understanding and is in agreement with treatment plan.   Follow up if symptoms do not improve or

## 2021-05-04 NOTE — PATIENT INSTRUCTIONS
Discussed that labyrinthitis is an infection of the inner ear- typically it is viral and just takes time to resolve. You should turn your head slowly, and not make any sudden movements.   Also avoid motion rides such as roller coasters, and going upside joelle

## 2021-05-19 ENCOUNTER — OFFICE VISIT (OUTPATIENT)
Dept: FAMILY MEDICINE CLINIC | Facility: CLINIC | Age: 71
End: 2021-05-19
Payer: COMMERCIAL

## 2021-05-19 VITALS
TEMPERATURE: 99 F | WEIGHT: 152 LBS | RESPIRATION RATE: 16 BRPM | OXYGEN SATURATION: 100 % | BODY MASS INDEX: 23.04 KG/M2 | HEART RATE: 74 BPM | DIASTOLIC BLOOD PRESSURE: 74 MMHG | HEIGHT: 68 IN | SYSTOLIC BLOOD PRESSURE: 112 MMHG

## 2021-05-19 DIAGNOSIS — N76.0 ACUTE VAGINITIS: Primary | ICD-10-CM

## 2021-05-19 PROCEDURE — 3074F SYST BP LT 130 MM HG: CPT | Performed by: NURSE PRACTITIONER

## 2021-05-19 PROCEDURE — 87510 GARDNER VAG DNA DIR PROBE: CPT | Performed by: NURSE PRACTITIONER

## 2021-05-19 PROCEDURE — 3078F DIAST BP <80 MM HG: CPT | Performed by: NURSE PRACTITIONER

## 2021-05-19 PROCEDURE — 99214 OFFICE O/P EST MOD 30 MIN: CPT | Performed by: NURSE PRACTITIONER

## 2021-05-19 PROCEDURE — 87660 TRICHOMONAS VAGIN DIR PROBE: CPT | Performed by: NURSE PRACTITIONER

## 2021-05-19 PROCEDURE — 87480 CANDIDA DNA DIR PROBE: CPT | Performed by: NURSE PRACTITIONER

## 2021-05-19 PROCEDURE — 3008F BODY MASS INDEX DOCD: CPT | Performed by: NURSE PRACTITIONER

## 2021-05-19 NOTE — PROGRESS NOTES
Patient ID:   Kari Frey  is a 80year old female    Allergies  No Known Allergies  Medications   CITALOPRAM HYDROBROMIDE 10 MG Oral Tab, TAKE 1/2 TABLET BY MOUTH EVERY DAY, Disp: 45 tablet, Rfl: 0  Estradiol 0.1 MG/GM Vaginal Cream, Apply pea-size Ht 5' 8\" (1.727 m)   Wt 152 lb (68.9 kg)   LMP 02/26/2002   SpO2 100%   BMI 23.11 kg/m²   Constitutional: well-developed and well-nourished. No distress.    Cardiovascular: Regular rate and rhythm no murmur  Pulmonary: Lungs are clear to auscultation bilat prescriptions requested or ordered in this encounter       Imaging & Consults:  None

## 2021-05-20 ENCOUNTER — TELEPHONE (OUTPATIENT)
Dept: FAMILY MEDICINE CLINIC | Facility: CLINIC | Age: 71
End: 2021-05-20

## 2021-05-20 NOTE — TELEPHONE ENCOUNTER
----- Message from CHANNING Rod sent at 5/20/2021 10:39 AM CDT -----  Please make sure patient receives my chart message as below-  Your vaginitis swab came back negative for yeast, bacterial vaginosis, and trichomonas-it may just be irritation-if

## 2021-05-26 ENCOUNTER — TELEPHONE (OUTPATIENT)
Dept: FAMILY MEDICINE CLINIC | Facility: CLINIC | Age: 71
End: 2021-05-26

## 2021-05-26 NOTE — TELEPHONE ENCOUNTER
Pt was seen in office on 5/4 for ear complaints. Pt states that symptoms did improve after taking the antibiotic, but the ear pain and itching never completely went away. Pt is wondering what your recommendations are.  Pt would prefer to not be on anothe

## 2021-05-26 NOTE — TELEPHONE ENCOUNTER
She can use Flonase, Rhinocort, Nasacort 2 sprays each nostril once a day–she could also try seeing a Chiropractor, I would recommend [facility]–Dr. Yandel Thao [phone]   She could also follow-up with an ENT (please let me know if patien

## 2021-05-26 NOTE — TELEPHONE ENCOUNTER
Patient states that she was seen for ear pain and prescribed antibiotics, states that the pain didn't go away after finishing the medication.  Wants to know if there's anything else she could take or do besides the antibiotic since it didn't help her or if

## 2021-06-15 DIAGNOSIS — F41.9 MILD ANXIETY: ICD-10-CM

## 2021-06-15 RX ORDER — CITALOPRAM 10 MG/1
TABLET ORAL
Qty: 45 TABLET | Refills: 0 | Status: SHIPPED | OUTPATIENT
Start: 2021-06-15 | End: 2021-06-18

## 2021-06-15 NOTE — TELEPHONE ENCOUNTER
Future appt:     Your appointments     Date & Time Appointment Department [facility])    Jun 28, 2021  1:20 PM CDT MA Supervisit with Charlie Bell [address] [place], [place] [address]GianfrancoАндрей ([place])            [address] [place] [address]

## 2021-06-18 ENCOUNTER — TELEPHONE (OUTPATIENT)
Dept: FAMILY MEDICINE CLINIC | Facility: CLINIC | Age: 71
End: 2021-06-18

## 2021-06-18 PROBLEM — F41.9 ANXIETY: Status: ACTIVE | Noted: 2021-06-18

## 2021-06-18 NOTE — PATIENT INSTRUCTIONS
Continue current medications  Insomnia unchanged. Anxiety worse. Recommend to start xanax. Continue with 10 mg of celexa. Schedule appointment with a counselor. Recommend breathing exercise and meditation. Monitor bruise.  May use tylenol as n

## 2021-06-18 NOTE — TELEPHONE ENCOUNTER
Pt called and states she has been having extreme anxiety - causing shakieness and loss sleep since her partner left her 3 days ago. Call sent to nurse to triage.

## 2021-06-18 NOTE — TELEPHONE ENCOUNTER
Pt stated that her partner left her and she is very shaky inside. Pt stated that she can not sleep even trying Ambien. citalopram is not helping with anxiety. Home alone, has no one with her.     Future Appointments   Date Time Provider Department Stefanie

## 2021-06-18 NOTE — PROGRESS NOTES
[address]  PROGRESS NOTE  Chief Complaint:   Patient presents with: Anxiety      HPI:   This is a 80year old female with history of insomnia and anxiety presents for evaluation of her anxiety getting worse.   Couple days ago patient's zolpidem 5 MG Oral Tab Take 1 tablet (5 mg total) by mouth daily as needed. 15 tablet 0   • ALPRAZolam 0.25 MG Oral Tab Take 1 tablet (0.25 mg total) by mouth daily as needed for Anxiety.  15 tablet 0   • Citalopram Hydrobromide 10 MG Oral Tab Take 1 tablet kg).   Vital signs reviewed. Physical Exam:  GEN:  Patient is alert, awake and oriented, well developed, well nourished, no acute distress. NECK: Supple, no carotid bruit, no JVD, no thyromegaly.    LUNGS: Clear to auscultation bilterally, no rales/rhonch redness, numbness or tingling. Do not use xanax or zolpidem together.          Health Maintenance:  COVID-19 Vaccine(1) Never done  Zoster Vaccines(1 of 2) Never done  Mammogram due on 06/29/2021  Fall Risk Screening due on 08/17/2021  Annual Wellness V

## 2021-06-24 ENCOUNTER — TELEPHONE (OUTPATIENT)
Dept: FAMILY MEDICINE CLINIC | Facility: CLINIC | Age: 71
End: 2021-06-24

## 2021-06-24 RX ORDER — ALPRAZOLAM 0.25 MG/1
0.25 TABLET ORAL 2 TIMES DAILY PRN
Qty: 30 TABLET | Refills: 0 | Status: SHIPPED | OUTPATIENT
Start: 2021-06-24 | End: 2021-06-28 | Stop reason: ALTCHOICE

## 2021-06-24 RX ORDER — ALPRAZOLAM 0.25 MG/1
0.25 TABLET ORAL DAILY PRN
Qty: 15 TABLET | Refills: 0 | Status: SHIPPED | OUTPATIENT
Start: 2021-06-24 | End: 2021-06-24

## 2021-06-24 NOTE — TELEPHONE ENCOUNTER
Pt stated that she saw Dr. Carolin Adam recently and was approved for xanax BID   [facility] stated can not refill because original script was for 1 a day, needs to be written for 1 or 2 a day PRN.    As written it is too early for the refill since was written for 1

## 2021-06-24 NOTE — TELEPHONE ENCOUNTER
RF Xanax   -   [facility]      [facility] [place]                    call to confirm please          Future Appointments   Date Time Provider Alysia Michelle   6/28/2021  1:20 PM Pasha Ross MD [facility] [place]

## 2021-06-24 NOTE — TELEPHONE ENCOUNTER
ins denied RF of Xanax    ( they do know the new directions of RX )    please resend with new 2x daily directions       only has 2 left. ...      [facility]

## 2021-06-24 NOTE — TELEPHONE ENCOUNTER
Future appt:     Your appointments     Date & Time Appointment Department [facility])    Jun 28, 2021  1:20 PM CDT MA Supervisit with Ifrah Pond, [address], Sylvie Irizarry ([facility])            [address] [facility]

## 2021-06-28 ENCOUNTER — OFFICE VISIT (OUTPATIENT)
Dept: FAMILY MEDICINE CLINIC | Facility: CLINIC | Age: 71
End: 2021-06-28
Payer: COMMERCIAL

## 2021-06-28 VITALS
DIASTOLIC BLOOD PRESSURE: 68 MMHG | HEART RATE: 80 BPM | WEIGHT: 151.81 LBS | BODY MASS INDEX: 23 KG/M2 | OXYGEN SATURATION: 100 % | TEMPERATURE: 99 F | SYSTOLIC BLOOD PRESSURE: 130 MMHG | RESPIRATION RATE: 16 BRPM

## 2021-06-28 DIAGNOSIS — Z12.31 BREAST CANCER SCREENING BY MAMMOGRAM: ICD-10-CM

## 2021-06-28 DIAGNOSIS — D32.0 BENIGN NEOPLASM OF CEREBRAL MENINGES (HCC): ICD-10-CM

## 2021-06-28 DIAGNOSIS — Z00.00 ENCOUNTER FOR MEDICARE ANNUAL WELLNESS EXAM: Primary | ICD-10-CM

## 2021-06-28 DIAGNOSIS — E78.49 FAMILIAL MULTIPLE LIPOPROTEIN-TYPE HYPERLIPIDEMIA: ICD-10-CM

## 2021-06-28 DIAGNOSIS — N28.9 KIDNEY LESION: ICD-10-CM

## 2021-06-28 DIAGNOSIS — G43.009 MIGRAINE WITHOUT AURA AND WITHOUT STATUS MIGRAINOSUS, NOT INTRACTABLE: ICD-10-CM

## 2021-06-28 DIAGNOSIS — F41.9 ANXIETY: ICD-10-CM

## 2021-06-28 DIAGNOSIS — G47.00 INSOMNIA, UNSPECIFIED TYPE: ICD-10-CM

## 2021-06-28 DIAGNOSIS — E55.9 VITAMIN D DEFICIENCY: ICD-10-CM

## 2021-06-28 DIAGNOSIS — Z00.00 ENCOUNTER FOR ANNUAL HEALTH EXAMINATION: ICD-10-CM

## 2021-06-28 DIAGNOSIS — D64.9 ANEMIA, UNSPECIFIED TYPE: ICD-10-CM

## 2021-06-28 DIAGNOSIS — E53.8 B12 DEFICIENCY: ICD-10-CM

## 2021-06-28 PROCEDURE — G0439 PPPS, SUBSEQ VISIT: HCPCS | Performed by: FAMILY MEDICINE

## 2021-06-28 PROCEDURE — 3078F DIAST BP <80 MM HG: CPT | Performed by: FAMILY MEDICINE

## 2021-06-28 PROCEDURE — 96160 PT-FOCUSED HLTH RISK ASSMT: CPT | Performed by: FAMILY MEDICINE

## 2021-06-28 PROCEDURE — 99397 PER PM REEVAL EST PAT 65+ YR: CPT | Performed by: FAMILY MEDICINE

## 2021-06-28 PROCEDURE — 3075F SYST BP GE 130 - 139MM HG: CPT | Performed by: FAMILY MEDICINE

## 2021-06-28 RX ORDER — CLONAZEPAM 0.5 MG/1
0.5 TABLET ORAL 2 TIMES DAILY
COMMUNITY
Start: 2021-06-26 | End: 2021-06-28

## 2021-06-28 RX ORDER — CLONAZEPAM 0.5 MG/1
0.5 TABLET ORAL 2 TIMES DAILY
Qty: 30 TABLET | Refills: 0 | Status: SHIPPED | OUTPATIENT
Start: 2021-06-28 | End: 2021-07-16

## 2021-06-28 RX ORDER — CITALOPRAM 20 MG/1
20 TABLET ORAL DAILY
Qty: 30 TABLET | Refills: 0 | Status: SHIPPED | OUTPATIENT
Start: 2021-06-28 | End: 2021-07-25

## 2021-06-28 NOTE — PATIENT INSTRUCTIONS
[facility], call [phone]    Continue with current medication. Increase citalopram to 20 mg.   Migraine stable with medication. Schedule appointment with psychiatrist.     Rajat Nelson to ER if any thought about harming self.      Tiffanie Sigmoidoscopy   Covered every 4 years -    Fecal Occult Blood Test Covered annually -   Bone Density Screening    Bone density screening    Covered every 2 years after age 72 if diagnosed with risk of osteoporosis or estrogen deficiency.     Covered yearly BUN 14 08/17/2020       Drug Serum Conc Annually No results found for: DIGOXIN, DIG, VALP              Recommended Websites for Advanced Directives    [email or web address]  An information packet, including necessar

## 2021-06-28 NOTE — PROGRESS NOTES
HPI:   Nikole Yanez is a 80year old female who presents for a MA (Medicare Advantage) Supervisit (Once per calendar year).     Patient with history of anxiety, recently she was evaluated at emergency room due to having thoughts about harming her Advance care planning including the explanation and discussion of advance directives standard forms performed Face to Face with patient and Family/surrogate (if present), and forms available to patient in AVS       She has never smoked tobacco.    CAGE s FIBER OR), Take by mouth. 2 tsp bid  Lactobacillus (PROBIOTIC ACIDOPHILUS OR), Take 1 tablet by mouth daily. melatonin 3 MG Oral Tab, Take 6 mg by mouth daily. Ospemifene (OSPHENA) 60 MG Oral Tab, Take by mouth daily.   Calcium Carb-Cholecalciferol (CALCI Time taken: 6/28/2021  1:30 PM  Screening Method: Finger Rub  Finger Rub Result: Pass             Visual Acuity  Right Eye Visual Acuity: Corrected Right Eye Chart Acuity: 20/13   Left Eye Visual Acuity: Corrected Left Eye Chart Acuity: 20/13   Both Eye 06/30/2017, 08/29/2017, 03/27/2018   • HIGH DOSE FLU 65 YRS AND OLDER PRSV FREE SINGLE D (61902) FLU CLINIC 09/28/2020   • Influenza 10/18/2016   • Pneumococcal (Prevnar 13) 08/15/2018   • Pneumovax 23 08/16/2019   • TDAP 06/30/2017   • TYPHOID 06/30/2017 hospital.  Migraine stable with medication. Continue with current medication as needed. Diet assessment: good     PLAN:  The patient indicates understanding of these issues and agrees to the plan. Reinforced healthy diet, lifestyle, and exercise.   Anai Fisher Component Value Date    CHOLEST 206 (H) 08/17/2020    HDL 79 08/17/2020     (H) 08/17/2020    TRIG 90 08/17/2020         Electrocardiogram (EKG)   Covered if needed at Welcome to Medicare, and non-screening if indicated for medical reasons 07/29/2 recommendations at this time    Tetanus Toxoid Not covered by Medicare Part B unless medically necessary (cut with metal); may be covered with your pharmacy prescription benefits -    Tetanus, Diptheria and Pertusis TD and TDaP Not covered by Medicare Part

## 2021-07-02 LAB
% SATURATION: 21 % (CALC) (ref 16–45)
CHOL/HDLC RATIO: 2.9 (CALC)
CHOLESTEROL, TOTAL: 209 MG/DL
FERRITIN: 39 NG/ML (ref 16–288)
HDL CHOLESTEROL: 72 MG/DL
IRON BINDING CAPACITY: 361 MCG/DL (CALC) (ref 250–450)
IRON, TOTAL: 75 MCG/DL (ref 45–160)
LDL-CHOLESTEROL: 120 MG/DL (CALC)
NON-HDL CHOLESTEROL: 137 MG/DL (CALC)
TRIGLYCERIDES: 78 MG/DL
TSH W/REFLEX TO FT4: 4.15 MIU/L (ref 0.4–4.5)
VITAMIN B12: 464 PG/ML (ref 200–1100)
VITAMIN D, 25-OH, TOTAL: 72 NG/ML (ref 30–100)

## 2021-07-16 ENCOUNTER — TELEPHONE (OUTPATIENT)
Dept: FAMILY MEDICINE CLINIC | Facility: CLINIC | Age: 71
End: 2021-07-16

## 2021-07-16 RX ORDER — CLONAZEPAM 0.5 MG/1
0.5 TABLET ORAL 2 TIMES DAILY
Qty: 30 TABLET | Refills: 0 | Status: SHIPPED | OUTPATIENT
Start: 2021-07-16 | End: 2021-07-29

## 2021-07-16 NOTE — TELEPHONE ENCOUNTER
Future appt:     Last Appointment with provider:   6/28/2021- advised Return in 2 weeks (on 7/12/2021).     Last refill: 6/28/21- clonazepam 0.5 mg    Future Appointments   Date Time Provider Alysia Martinez   7/29/2021  8:40 AM Connie Constantino MD [facility]

## 2021-07-25 RX ORDER — CITALOPRAM 20 MG/1
TABLET ORAL
Qty: 30 TABLET | Refills: 0 | Status: SHIPPED | OUTPATIENT
Start: 2021-07-25 | End: 2021-08-23

## 2021-07-29 NOTE — PROGRESS NOTES
[address]  PROGRESS NOTE  Chief Complaint:   Patient presents with: Follow - Up: one month      HPI:   This is a 80year old female with anxiety, hyperlipidemia presents for follow-up and medication checkup.   Patient recently has start needed for discomfort. 1 Tube 3   • RIZATRIPTAN BENZOATE 10 MG Oral Tab TAKE 1 TABLET BY MOUTH EVERY DAY AS NEEDED 9 tablet 1   • Lactobacillus (PROBIOTIC ACIDOPHILUS OR) Take 1 tablet by mouth daily. • melatonin 3 MG Oral Tab Take 6 mg by mouth daily. bilaterally. ABDOMEN: Soft, nondistended, nontender, bowel sounds normal in all 4 quadrants, no Masses, no hepatosplenomegaly. SKIN: No rashes, no skin lesion, no bruising, good turgor. LYMPHATIC: No cervical lymphadenopathy, no other lymphadenopathy. this note.

## 2021-07-29 NOTE — PATIENT INSTRUCTIONS
Continue current medications  Anxiety improving. Will follow up with psychiatrist for future refills. Recommend to see a counselor. Advice low cholesterol diet and exercise. May use fish oil supplement.

## 2021-08-09 RX ORDER — CLONAZEPAM 0.5 MG/1
TABLET ORAL
Qty: 30 TABLET | Refills: 0 | Status: SHIPPED | OUTPATIENT
Start: 2021-08-09 | End: 2021-08-31

## 2021-08-09 NOTE — TELEPHONE ENCOUNTER
Future appt:    Last Appointment with provider:   7/29/2021 for medication follow up. Return in 6 months (1/29/22).   Last appointment at [facility]:  7/29/2021  CHOLESTEROL, TOTAL (mg/dL)   Date Value   07/01/2021 209 (H)     HDL CHOLESTEROL (mg/dL)   Da

## 2021-08-11 ENCOUNTER — TELEPHONE (OUTPATIENT)
Dept: FAMILY MEDICINE CLINIC | Facility: CLINIC | Age: 71
End: 2021-08-11

## 2021-08-11 DIAGNOSIS — N28.9 KIDNEY LESION: Primary | ICD-10-CM

## 2021-08-11 NOTE — TELEPHONE ENCOUNTER
Please inform patient that ultrasound of the kidneys shows 5 mm lesion on the lower pole of her right kidney. Recommend to see urologist for further evaluation and recommendation.   Please fax referral to Dr. Emory Kebede

## 2021-08-23 RX ORDER — CITALOPRAM 20 MG/1
TABLET ORAL
Qty: 30 TABLET | Refills: 5 | Status: SHIPPED | OUTPATIENT
Start: 2021-08-23

## 2021-08-23 NOTE — TELEPHONE ENCOUNTER
Future appt:     Last Appointment with provider:   7/29/2021- advised Return in about 6 months (around 1/29/2022) for follow up.     Last refill: 7/25/21- citalopram       CHOLESTEROL, TOTAL (mg/dL)   Date Value   07/01/2021 209 (H)     HDL CHOLESTEROL (mg/

## 2021-08-30 ENCOUNTER — TELEPHONE (OUTPATIENT)
Dept: FAMILY MEDICINE CLINIC | Facility: CLINIC | Age: 71
End: 2021-08-30

## 2021-08-30 NOTE — TELEPHONE ENCOUNTER
Spoke with patient. As below. Patient c/o dizziness and her head feeling \"funny. \"  Patient unsure what her bp is now. Denies any chest pain, SOB, blurred vision, n/v/d.   Advised patient to seek treatment in the ER based on symptoms and no appts availa

## 2021-08-30 NOTE — TELEPHONE ENCOUNTER
Pt calling and states she went to her psychiatrist today and they took her BP. Pt was advised it was very high at 190/? - pt was advised to go to ER. ER had a long wait so pt went to immediate care and was advised it would be a 4 hour wait.      No opening

## 2021-08-31 ENCOUNTER — OFFICE VISIT (OUTPATIENT)
Dept: FAMILY MEDICINE CLINIC | Facility: CLINIC | Age: 71
End: 2021-08-31
Payer: COMMERCIAL

## 2021-08-31 VITALS
WEIGHT: 156 LBS | HEART RATE: 76 BPM | TEMPERATURE: 99 F | OXYGEN SATURATION: 97 % | HEIGHT: 68 IN | BODY MASS INDEX: 23.64 KG/M2 | RESPIRATION RATE: 16 BRPM | SYSTOLIC BLOOD PRESSURE: 116 MMHG | DIASTOLIC BLOOD PRESSURE: 60 MMHG

## 2021-08-31 DIAGNOSIS — R03.0 ELEVATED BLOOD PRESSURE READING: Primary | ICD-10-CM

## 2021-08-31 PROCEDURE — 3008F BODY MASS INDEX DOCD: CPT | Performed by: NURSE PRACTITIONER

## 2021-08-31 PROCEDURE — 3074F SYST BP LT 130 MM HG: CPT | Performed by: NURSE PRACTITIONER

## 2021-08-31 PROCEDURE — 3078F DIAST BP <80 MM HG: CPT | Performed by: NURSE PRACTITIONER

## 2021-08-31 PROCEDURE — 99213 OFFICE O/P EST LOW 20 MIN: CPT | Performed by: NURSE PRACTITIONER

## 2021-08-31 RX ORDER — CITALOPRAM 10 MG/1
TABLET ORAL
COMMUNITY
Start: 2021-08-30 | End: 2021-12-23

## 2021-08-31 RX ORDER — TRAZODONE HYDROCHLORIDE 50 MG/1
TABLET ORAL
COMMUNITY
Start: 2021-08-30

## 2021-08-31 NOTE — TELEPHONE ENCOUNTER
Chart review for prior telephone/ER encounter. Mammogram noted in [address]. Please print and provide to PCP, enter into [facility] reports.

## 2021-08-31 NOTE — PROGRESS NOTES
Jane Jewell is a 80year old female. Patient presents with:  Blood Pressure      HPI:   Complaints of elevated blood pressure - was at the psychiatrist -  191/? .  - was taken on the wrist 161 - - automatic cuff -  Was sent to ER   Went to 2 urge drinks      Comment: 1/ month    Drug use: No    Family History   Problem Relation Age of Onset   • Heart Disease Father    • Hypertension Father    • Prostate Cancer Father    • Breast Cancer Mother         Allergies  No Known Allergies    REVIEW OF SYSTE pressure monitors are designed for certain ages and cases. You can find monitors for older adults, for pregnant women, and for children. Make sure the one you choose is the right one for your age and situation.   The American Heart Association advises an au You can also bring your cuff to your healthcare provider and have them show you how to correctly place the cuff. Step 3. Inflate the cuff    · Push the button that starts the pump. · The cuff will tighten, then loosen. · The numbers will change.  When th

## 2021-08-31 NOTE — PATIENT INSTRUCTIONS
Recommend blood pressure cuff for home-  arm cuff-  See below   Goal is 120/80 or less-  Call if it is over 140/90     Taking Your Blood Pressure  Blood pressure is the force of blood against the artery wall as it moves from the heart through the blood ves others until after you measure you blood pressure. Step 2. Wrap the cuff    · Place your arm on the table, palm up. Your arm should be at the level of your heart. Wrap the cuff around your upper arm, just above your elbow.  It’s best done on bare skin, not follow your healthcare professional's instructions.

## 2021-10-11 ENCOUNTER — TELEPHONE (OUTPATIENT)
Dept: FAMILY MEDICINE CLINIC | Facility: CLINIC | Age: 71
End: 2021-10-11

## 2021-10-11 NOTE — TELEPHONE ENCOUNTER
I have left a detailed message.     Pt was already using the a&d   And was already planning to come to her appt tomorrow from our previous conversation

## 2021-10-11 NOTE — TELEPHONE ENCOUNTER
Since the Monistat burns, it may not be a yeast infection–it may be bacterial vaginosis \"recommend using A&D ointment until vaginal swab can be obtained.

## 2021-10-11 NOTE — TELEPHONE ENCOUNTER
patient knows it's a yeast infection, can she some medication today?   Future Appointments   Date Time Provider Alysia Martinez   10/12/2021 11:30 AM CHANNING Aggarwal [facility] [place] [facility]

## 2021-10-11 NOTE — TELEPHONE ENCOUNTER
Pt is calling because she says that she knows she has a yeast infection    She bought OTC monostat but its burning her and shes tried to wipe if out and off of her, applied a&d states that's helping.     She has an appt tomorrow and was wanting to know if t

## 2021-10-12 ENCOUNTER — OFFICE VISIT (OUTPATIENT)
Dept: FAMILY MEDICINE CLINIC | Facility: CLINIC | Age: 71
End: 2021-10-12
Payer: COMMERCIAL

## 2021-10-12 VITALS
DIASTOLIC BLOOD PRESSURE: 70 MMHG | HEIGHT: 68 IN | RESPIRATION RATE: 14 BRPM | SYSTOLIC BLOOD PRESSURE: 120 MMHG | HEART RATE: 75 BPM | OXYGEN SATURATION: 99 % | TEMPERATURE: 98 F | BODY MASS INDEX: 23.64 KG/M2 | WEIGHT: 156 LBS

## 2021-10-12 DIAGNOSIS — N76.0 ACUTE VAGINITIS: Primary | ICD-10-CM

## 2021-10-12 PROCEDURE — 87660 TRICHOMONAS VAGIN DIR PROBE: CPT | Performed by: NURSE PRACTITIONER

## 2021-10-12 PROCEDURE — 3074F SYST BP LT 130 MM HG: CPT | Performed by: NURSE PRACTITIONER

## 2021-10-12 PROCEDURE — 87480 CANDIDA DNA DIR PROBE: CPT | Performed by: NURSE PRACTITIONER

## 2021-10-12 PROCEDURE — 3008F BODY MASS INDEX DOCD: CPT | Performed by: NURSE PRACTITIONER

## 2021-10-12 PROCEDURE — 99214 OFFICE O/P EST MOD 30 MIN: CPT | Performed by: NURSE PRACTITIONER

## 2021-10-12 PROCEDURE — 3078F DIAST BP <80 MM HG: CPT | Performed by: NURSE PRACTITIONER

## 2021-10-12 PROCEDURE — 87510 GARDNER VAG DNA DIR PROBE: CPT | Performed by: NURSE PRACTITIONER

## 2021-10-12 RX ORDER — FLUCONAZOLE 100 MG/1
100 TABLET ORAL DAILY
Qty: 3 TABLET | Refills: 0 | Status: SHIPPED | OUTPATIENT
Start: 2021-10-12 | End: 2021-10-15

## 2021-10-12 NOTE — PROGRESS NOTES
Patient ID:   Khang Lutz  is a 80year old female    Allergies  No Known Allergies  Medications   traZODone 50 MG Oral Tab, , Disp: , Rfl:   citalopram 10 MG Oral Tab, , Disp: , Rfl:   CITALOPRAM HYDROBROMIDE 20 MG Oral Tab, TAKE 1 TABLET BY MOUTH Genitourinary:        Vulva: Erythematous, no lesions. Introitus and perineum: Normal.   Urethra: Normal .   Bladder: No evidence of cystocele .    Vagina: no evidence of rectocele or cystocele  Small amount of white discharge noted no erythema    Cer

## 2021-10-14 ENCOUNTER — TELEPHONE (OUTPATIENT)
Dept: FAMILY MEDICINE CLINIC | Facility: CLINIC | Age: 71
End: 2021-10-14

## 2021-10-14 NOTE — TELEPHONE ENCOUNTER
----- Message from Yunier Pérez sent at 10/14/2021 11:52 AM CDT -----  2nd call. Patient leaving for [place] today at 2:30.  Wants to know if can get rx before she leaves

## 2021-10-14 NOTE — TELEPHONE ENCOUNTER
----- Message from Maira Slade sent at 10/14/2021 11:52 AM CDT -----  2nd call. Patient leaving for [place] today at 2:30.  Wants to know if can get rx before she leaves

## 2021-10-14 NOTE — TELEPHONE ENCOUNTER
still itchy     requesting RX to [facility] [employer]      please advise  / call her back       No future appointments.

## 2021-11-22 ENCOUNTER — TELEPHONE (OUTPATIENT)
Dept: FAMILY MEDICINE CLINIC | Facility: CLINIC | Age: 71
End: 2021-11-22

## 2021-11-22 NOTE — TELEPHONE ENCOUNTER
Patient is tolerating food and fluids, but becomes bloated and has abdominal discomfort afterward, for approximately 1 week. She is having BM's normally. No appointments open this week.  Advised patient to go to immediate care for this and she agrees wit

## 2021-12-23 ENCOUNTER — OFFICE VISIT (OUTPATIENT)
Dept: FAMILY MEDICINE CLINIC | Facility: CLINIC | Age: 71
End: 2021-12-23
Payer: COMMERCIAL

## 2021-12-23 VITALS
OXYGEN SATURATION: 99 % | BODY MASS INDEX: 24.19 KG/M2 | SYSTOLIC BLOOD PRESSURE: 98 MMHG | RESPIRATION RATE: 16 BRPM | DIASTOLIC BLOOD PRESSURE: 72 MMHG | TEMPERATURE: 99 F | HEIGHT: 68 IN | WEIGHT: 159.63 LBS | HEART RATE: 75 BPM

## 2021-12-23 DIAGNOSIS — H65.191 OTHER NON-RECURRENT ACUTE NONSUPPURATIVE OTITIS MEDIA OF RIGHT EAR: Primary | ICD-10-CM

## 2021-12-23 DIAGNOSIS — J31.0 CHRONIC RHINITIS: ICD-10-CM

## 2021-12-23 PROBLEM — F33.1 MODERATE EPISODE OF RECURRENT MAJOR DEPRESSIVE DISORDER (HCC): Status: ACTIVE | Noted: 2020-12-17

## 2021-12-23 PROBLEM — N28.89 RENAL MASS: Status: ACTIVE | Noted: 2020-06-12

## 2021-12-23 PROBLEM — K59.00 CONSTIPATION: Status: ACTIVE | Noted: 2020-09-20

## 2021-12-23 PROBLEM — R10.9 ABDOMINAL PAIN: Status: ACTIVE | Noted: 2020-09-20

## 2021-12-23 PROCEDURE — 3074F SYST BP LT 130 MM HG: CPT | Performed by: NURSE PRACTITIONER

## 2021-12-23 PROCEDURE — 3078F DIAST BP <80 MM HG: CPT | Performed by: NURSE PRACTITIONER

## 2021-12-23 PROCEDURE — 99213 OFFICE O/P EST LOW 20 MIN: CPT | Performed by: NURSE PRACTITIONER

## 2021-12-23 PROCEDURE — 3008F BODY MASS INDEX DOCD: CPT | Performed by: NURSE PRACTITIONER

## 2021-12-23 RX ORDER — IMIQUIMOD 12.5 MG/.25G
CREAM TOPICAL
COMMUNITY
Start: 2021-12-03 | End: 2021-12-23

## 2021-12-23 RX ORDER — AMOXICILLIN AND CLAVULANATE POTASSIUM 875; 125 MG/1; MG/1
1 TABLET, FILM COATED ORAL 2 TIMES DAILY
Qty: 20 TABLET | Refills: 0 | Status: SHIPPED | OUTPATIENT
Start: 2021-12-23 | End: 2022-01-02

## 2021-12-23 RX ORDER — NEOMYCIN SULFATE, POLYMYXIN B SULFATE AND HYDROCORTISONE 10; 3.5; 1 MG/ML; MG/ML; [USP'U]/ML
SUSPENSION/ DROPS AURICULAR (OTIC)
COMMUNITY
Start: 2021-12-07 | End: 2021-12-23

## 2021-12-23 RX ORDER — PREDNISONE 20 MG/1
TABLET ORAL
COMMUNITY
Start: 2021-12-07 | End: 2021-12-23

## 2021-12-23 RX ORDER — MECLIZINE HYDROCHLORIDE 25 MG/1
TABLET ORAL
COMMUNITY
Start: 2021-12-07 | End: 2021-12-23

## 2021-12-23 NOTE — PROGRESS NOTES
CHIEF COMPLAINT:   Patient presents with:  Ear Wax: R ear plugged x1.5 weeks       HPI:   Giselle Lozano is a 80year old female who presents to clinic today with complaints of right plugged    Symptoms started 10 days ago.      Has feeling of righ Drug use: No      Sexual activity: Yes        Partners: Male    Other Topics      Concerns:        Caffeine Concern: Yes          Ice tea daily        Exercise: Yes          walks       REVIEW OF SYSTEMS:   GENERAL: feels well other than symptoms ,  good a otitis media of right ear  (primary encounter diagnosis)  Chronic rhinitis  Start augmentin one tablet twice a day for ten days  Start flonase daily  Notify ENT regarding findings/treatment    PLAN:   Meds as listed below.   Comfort measures as described in

## 2021-12-23 NOTE — PATIENT INSTRUCTIONS
Augmentin 1 tablet twice a day for ten days; take with food  flonase two sprays each nostril once a day for a week then one spray each nostril daily  Notify ENT regarding findings

## 2021-12-28 ENCOUNTER — VIRTUAL PHONE E/M (OUTPATIENT)
Dept: FAMILY MEDICINE CLINIC | Facility: CLINIC | Age: 71
End: 2021-12-28
Payer: COMMERCIAL

## 2021-12-28 VITALS — BODY MASS INDEX: 24.91 KG/M2 | HEIGHT: 66 IN | WEIGHT: 155 LBS | TEMPERATURE: 98 F

## 2021-12-28 DIAGNOSIS — J06.9 VIRAL UPPER RESPIRATORY TRACT INFECTION: Primary | ICD-10-CM

## 2021-12-28 PROCEDURE — 99442 PHONE E/M BY PHYS 11-20 MIN: CPT | Performed by: NURSE PRACTITIONER

## 2021-12-28 PROCEDURE — 3008F BODY MASS INDEX DOCD: CPT | Performed by: NURSE PRACTITIONER

## 2021-12-28 NOTE — PATIENT INSTRUCTIONS
Rest, increase fluids, salt water gargles ,neti pot (use distilled water) or saline nasal spray, Mucinex DM, vicks vapo rub, Tylenol/Ibuprofen, follow up if symptoms persist or increase.       Sleep on your stomach can help prevent pneumonia     and then I

## 2021-12-28 NOTE — PROGRESS NOTES
CHIEF COMPLAINT:   Patient presents with:  Upper Respiratory Infection    Bertha Sinclair  verbally consents to a Virtual/Telephone Check-In service on 12/28/2021.     Patient understands and accepts financial responsibility for any deductible, co-i Past Medical History:   Diagnosis Date   • Depression    • Endometriosis    • History of malaria    • Meningioma (HCC)    • Microscopic hematuria    • Ovarian cyst    • Pineal gland cyst       Social History:  Social History    Tobacco Use      Smoking s

## 2022-01-04 ENCOUNTER — TELEPHONE (OUTPATIENT)
Dept: FAMILY MEDICINE CLINIC | Facility: CLINIC | Age: 72
End: 2022-01-04

## 2022-01-04 NOTE — TELEPHONE ENCOUNTER
Please notify patient that [phone] is negative continue present symptoms follow-up if symptoms persist or increase.

## 2022-01-13 ENCOUNTER — OFFICE VISIT (OUTPATIENT)
Dept: FAMILY MEDICINE CLINIC | Facility: CLINIC | Age: 72
End: 2022-01-13
Payer: COMMERCIAL

## 2022-01-13 VITALS
WEIGHT: 161 LBS | HEART RATE: 72 BPM | BODY MASS INDEX: 24.4 KG/M2 | HEIGHT: 68 IN | TEMPERATURE: 98 F | SYSTOLIC BLOOD PRESSURE: 112 MMHG | OXYGEN SATURATION: 98 % | RESPIRATION RATE: 18 BRPM | DIASTOLIC BLOOD PRESSURE: 82 MMHG

## 2022-01-13 DIAGNOSIS — N76.0 ACUTE VAGINITIS: Primary | ICD-10-CM

## 2022-01-13 PROCEDURE — 87510 GARDNER VAG DNA DIR PROBE: CPT | Performed by: NURSE PRACTITIONER

## 2022-01-13 PROCEDURE — 3074F SYST BP LT 130 MM HG: CPT | Performed by: NURSE PRACTITIONER

## 2022-01-13 PROCEDURE — 87660 TRICHOMONAS VAGIN DIR PROBE: CPT | Performed by: NURSE PRACTITIONER

## 2022-01-13 PROCEDURE — 3079F DIAST BP 80-89 MM HG: CPT | Performed by: NURSE PRACTITIONER

## 2022-01-13 PROCEDURE — 3008F BODY MASS INDEX DOCD: CPT | Performed by: NURSE PRACTITIONER

## 2022-01-13 PROCEDURE — 87480 CANDIDA DNA DIR PROBE: CPT | Performed by: NURSE PRACTITIONER

## 2022-01-13 PROCEDURE — 99213 OFFICE O/P EST LOW 20 MIN: CPT | Performed by: NURSE PRACTITIONER

## 2022-01-13 RX ORDER — FLUCONAZOLE 100 MG/1
100 TABLET ORAL DAILY
Qty: 3 TABLET | Refills: 0 | Status: SHIPPED | OUTPATIENT
Start: 2022-01-13 | End: 2022-01-16

## 2022-01-13 RX ORDER — IMIQUIMOD 12.5 MG/.25G
CREAM TOPICAL
COMMUNITY
Start: 2022-01-13

## 2022-01-13 NOTE — PATIENT INSTRUCTIONS
Vaginal swab pending. Will treat based on results. Take Diflucan daily for 3 days. Follow up as needed.

## 2022-01-13 NOTE — PROGRESS NOTES
HPI:    Patient ID: Nhi White is a 80year old female. HPI     Patient is present with concern about white itchy vaginal discharge. Took course of antibiotics the end of December into January.  Treated self for 3 days (S, M Tu) - used Kaden Lemon Genitourinary:     Vagina: Erythema and tenderness present. No vaginal discharge. Cervix: No cervical motion tenderness. Musculoskeletal:         General: Normal range of motion. Skin:     General: Skin is warm and dry.    Neurological:

## 2022-01-14 ENCOUNTER — TELEPHONE (OUTPATIENT)
Dept: FAMILY MEDICINE CLINIC | Facility: CLINIC | Age: 72
End: 2022-01-14

## 2022-01-14 RX ORDER — METRONIDAZOLE 500 MG/1
500 TABLET ORAL 2 TIMES DAILY
Qty: 14 TABLET | Refills: 0 | Status: SHIPPED | OUTPATIENT
Start: 2022-01-14 | End: 2022-01-21

## 2022-01-14 NOTE — TELEPHONE ENCOUNTER
Discussed recommendations with patient and verbalizes understanding. Patient did not receive the Imiquimod 5% cream. Shows pharmacy did not receive. Please resend.

## 2022-01-14 NOTE — TELEPHONE ENCOUNTER
----- Message from [id number] PresCHANNING Castellanos sent at 1/14/2022 11:12 AM CST -----  Swab is positive for BV. Recommend to hold the remaining diflucan tablets until the end of the Flagyl treatment.    Please discuss with patient the causes of bacterial vaginosi

## 2022-03-09 ENCOUNTER — TELEMEDICINE (OUTPATIENT)
Dept: FAMILY MEDICINE CLINIC | Facility: CLINIC | Age: 72
End: 2022-03-09
Payer: COMMERCIAL

## 2022-03-09 DIAGNOSIS — R05.8 COUGH WITH EXPOSURE TO COVID-19 VIRUS: Primary | ICD-10-CM

## 2022-03-09 DIAGNOSIS — Z20.822 COUGH WITH EXPOSURE TO COVID-19 VIRUS: Primary | ICD-10-CM

## 2022-03-09 PROCEDURE — 99213 OFFICE O/P EST LOW 20 MIN: CPT | Performed by: NURSE PRACTITIONER

## 2022-03-09 RX ORDER — FLUCONAZOLE 100 MG/1
100 TABLET ORAL DAILY
Qty: 3 TABLET | Refills: 0 | Status: SHIPPED | OUTPATIENT
Start: 2022-03-09 | End: 2022-03-12

## 2022-03-09 RX ORDER — AZITHROMYCIN 250 MG/1
TABLET, FILM COATED ORAL
Qty: 6 TABLET | Refills: 0 | Status: SHIPPED | OUTPATIENT
Start: 2022-03-09 | End: 2022-03-14

## 2022-03-12 NOTE — PATIENT INSTRUCTIONS
Start zithomax. Use diflucan if starts to have vaginal symptoms of a yeast infection. Follow up if not improving. If any respiratory distress (drop in the O2 below 90%) go to the ER.

## 2022-06-16 ENCOUNTER — OFFICE VISIT (OUTPATIENT)
Dept: FAMILY MEDICINE CLINIC | Facility: CLINIC | Age: 72
End: 2022-06-16
Payer: COMMERCIAL

## 2022-06-16 VITALS
TEMPERATURE: 99 F | WEIGHT: 159.81 LBS | DIASTOLIC BLOOD PRESSURE: 72 MMHG | HEIGHT: 65.5 IN | HEART RATE: 75 BPM | RESPIRATION RATE: 18 BRPM | OXYGEN SATURATION: 99 % | SYSTOLIC BLOOD PRESSURE: 118 MMHG | BODY MASS INDEX: 26.31 KG/M2

## 2022-06-16 DIAGNOSIS — G43.009 MIGRAINE WITHOUT AURA AND WITHOUT STATUS MIGRAINOSUS, NOT INTRACTABLE: ICD-10-CM

## 2022-06-16 DIAGNOSIS — Z00.00 ENCOUNTER FOR ANNUAL HEALTH EXAMINATION: ICD-10-CM

## 2022-06-16 DIAGNOSIS — E53.8 B12 DEFICIENCY: ICD-10-CM

## 2022-06-16 DIAGNOSIS — M85.89 OSTEOPENIA OF MULTIPLE SITES: ICD-10-CM

## 2022-06-16 DIAGNOSIS — G47.00 INSOMNIA, UNSPECIFIED TYPE: ICD-10-CM

## 2022-06-16 DIAGNOSIS — E78.49 FAMILIAL MULTIPLE LIPOPROTEIN-TYPE HYPERLIPIDEMIA: ICD-10-CM

## 2022-06-16 DIAGNOSIS — D32.0 BENIGN NEOPLASM OF CEREBRAL MENINGES (HCC): ICD-10-CM

## 2022-06-16 DIAGNOSIS — F33.1 MODERATE EPISODE OF RECURRENT MAJOR DEPRESSIVE DISORDER (HCC): ICD-10-CM

## 2022-06-16 DIAGNOSIS — F41.9 ANXIETY: ICD-10-CM

## 2022-06-16 DIAGNOSIS — K59.04 CHRONIC IDIOPATHIC CONSTIPATION: ICD-10-CM

## 2022-06-16 DIAGNOSIS — D64.9 ANEMIA, UNSPECIFIED TYPE: ICD-10-CM

## 2022-06-16 DIAGNOSIS — N28.89 RENAL MASS: ICD-10-CM

## 2022-06-16 DIAGNOSIS — E55.9 VITAMIN D DEFICIENCY: ICD-10-CM

## 2022-06-16 DIAGNOSIS — Z00.00 ENCOUNTER FOR MEDICARE ANNUAL WELLNESS EXAM: Primary | ICD-10-CM

## 2022-06-16 DIAGNOSIS — Z78.0 POSTMENOPAUSAL: ICD-10-CM

## 2022-06-16 PROBLEM — R10.9 ABDOMINAL PAIN: Status: RESOLVED | Noted: 2020-09-20 | Resolved: 2022-06-16

## 2022-06-16 PROCEDURE — 3074F SYST BP LT 130 MM HG: CPT | Performed by: FAMILY MEDICINE

## 2022-06-16 PROCEDURE — 1126F AMNT PAIN NOTED NONE PRSNT: CPT | Performed by: FAMILY MEDICINE

## 2022-06-16 PROCEDURE — G0439 PPPS, SUBSEQ VISIT: HCPCS | Performed by: FAMILY MEDICINE

## 2022-06-16 PROCEDURE — 99397 PER PM REEVAL EST PAT 65+ YR: CPT | Performed by: FAMILY MEDICINE

## 2022-06-16 PROCEDURE — 96160 PT-FOCUSED HLTH RISK ASSMT: CPT | Performed by: FAMILY MEDICINE

## 2022-06-16 PROCEDURE — 3008F BODY MASS INDEX DOCD: CPT | Performed by: FAMILY MEDICINE

## 2022-06-16 PROCEDURE — 3078F DIAST BP <80 MM HG: CPT | Performed by: FAMILY MEDICINE

## 2022-06-28 LAB
ABSOLUTE BASOPHILS: 32 CELLS/UL (ref 0–200)
ABSOLUTE EOSINOPHILS: 113 CELLS/UL (ref 15–500)
ABSOLUTE LYMPHOCYTES: 1791 CELLS/UL (ref 850–3900)
ABSOLUTE MONOCYTES: 374 CELLS/UL (ref 200–950)
ABSOLUTE NEUTROPHILS: 2192 CELLS/UL (ref 1500–7800)
ALBUMIN/GLOBULIN RATIO: 1.6 (CALC) (ref 1–2.5)
ALBUMIN: 4.1 G/DL (ref 3.6–5.1)
ALKALINE PHOSPHATASE: 53 U/L (ref 37–153)
ALT: 8 U/L (ref 6–29)
AST: 16 U/L (ref 10–35)
BASOPHILS: 0.7 %
BILIRUBIN, TOTAL: 0.5 MG/DL (ref 0.2–1.2)
BUN/CREATININE RATIO: 21 (CALC) (ref 6–22)
BUN: 21 MG/DL (ref 7–25)
CALCIUM: 9.5 MG/DL (ref 8.6–10.4)
CARBON DIOXIDE: 29 MMOL/L (ref 20–32)
CHLORIDE: 105 MMOL/L (ref 98–110)
CHOL/HDLC RATIO: 2.2 (CALC)
CHOLESTEROL, TOTAL: 192 MG/DL
CREATININE: 1 MG/DL (ref 0.6–0.93)
EGFR IF AFRICN AM: 66 ML/MIN/1.73M2
EGFR IF NONAFRICN AM: 57 ML/MIN/1.73M2
EOSINOPHILS: 2.5 %
GLOBULIN: 2.6 G/DL (CALC) (ref 1.9–3.7)
GLUCOSE: 86 MG/DL (ref 65–99)
HDL CHOLESTEROL: 86 MG/DL
HEMATOCRIT: 33.9 % (ref 35–45)
HEMOGLOBIN: 11.3 G/DL (ref 11.7–15.5)
LDL-CHOLESTEROL: 91 MG/DL (CALC)
LYMPHOCYTES: 39.8 %
MCH: 31.2 PG (ref 27–33)
MCHC: 33.3 G/DL (ref 32–36)
MCV: 93.6 FL (ref 80–100)
MONOCYTES: 8.3 %
MPV: 10.3 FL (ref 7.5–12.5)
NEUTROPHILS: 48.7 %
NON-HDL CHOLESTEROL: 106 MG/DL (CALC)
PLATELET COUNT: 275 THOUSAND/UL (ref 140–400)
POTASSIUM: 4.5 MMOL/L (ref 3.5–5.3)
PROTEIN, TOTAL: 6.7 G/DL (ref 6.1–8.1)
RDW: 11.9 % (ref 11–15)
RED BLOOD CELL COUNT: 3.62 MILLION/UL (ref 3.8–5.1)
SODIUM: 139 MMOL/L (ref 135–146)
T4, FREE: 1 NG/DL (ref 0.8–1.8)
TRIGLYCERIDES: 67 MG/DL
TSH W/REFLEX TO FT4: 5.39 MIU/L (ref 0.4–4.5)
VITAMIN B12: 278 PG/ML (ref 200–1100)
VITAMIN D, 25-OH, TOTAL: 67 NG/ML (ref 30–100)
WHITE BLOOD CELL COUNT: 4.5 THOUSAND/UL (ref 3.8–10.8)

## 2022-09-14 ENCOUNTER — TELEPHONE (OUTPATIENT)
Dept: FAMILY MEDICINE CLINIC | Facility: CLINIC | Age: 72
End: 2022-09-14

## 2022-09-14 DIAGNOSIS — M85.80 OSTEOPENIA, UNSPECIFIED LOCATION: Primary | ICD-10-CM

## 2022-09-14 NOTE — TELEPHONE ENCOUNTER
Spoke to pt verbalized understanding of lab results and recommendations. Pt asked for lab results from June. Given to pt no further questions.

## 2022-09-14 NOTE — TELEPHONE ENCOUNTER
Please inform patient that DEXA scan shows that she has diagnosis of osteopenia. Her 10-year risk of major fracture is 15% and 10-year hip fracture risk is 3.3%. I recommend that she takes at least 1200 mg of calcium and 800 units of vitamin D daily. Recommend to recheck in 2 to 3 years.

## 2022-10-10 ENCOUNTER — TELEPHONE (OUTPATIENT)
Dept: FAMILY MEDICINE CLINIC | Facility: CLINIC | Age: 72
End: 2022-10-10

## 2022-10-10 ENCOUNTER — OFFICE VISIT (OUTPATIENT)
Dept: FAMILY MEDICINE CLINIC | Facility: CLINIC | Age: 72
End: 2022-10-10
Payer: COMMERCIAL

## 2022-10-10 VITALS
BODY MASS INDEX: 26 KG/M2 | TEMPERATURE: 99 F | HEART RATE: 75 BPM | SYSTOLIC BLOOD PRESSURE: 138 MMHG | WEIGHT: 160 LBS | OXYGEN SATURATION: 99 % | DIASTOLIC BLOOD PRESSURE: 70 MMHG

## 2022-10-10 DIAGNOSIS — J01.00 ACUTE NON-RECURRENT MAXILLARY SINUSITIS: ICD-10-CM

## 2022-10-10 DIAGNOSIS — H65.01 NON-RECURRENT ACUTE SEROUS OTITIS MEDIA OF RIGHT EAR: Primary | ICD-10-CM

## 2022-10-10 PROCEDURE — 99213 OFFICE O/P EST LOW 20 MIN: CPT | Performed by: NURSE PRACTITIONER

## 2022-10-10 PROCEDURE — 3075F SYST BP GE 130 - 139MM HG: CPT | Performed by: NURSE PRACTITIONER

## 2022-10-10 PROCEDURE — 3078F DIAST BP <80 MM HG: CPT | Performed by: NURSE PRACTITIONER

## 2022-10-10 RX ORDER — AMOXICILLIN AND CLAVULANATE POTASSIUM 875; 125 MG/1; MG/1
1 TABLET, FILM COATED ORAL 2 TIMES DAILY
Qty: 20 TABLET | Refills: 0 | Status: SHIPPED | OUTPATIENT
Start: 2022-10-10 | End: 2022-10-20

## 2022-10-10 NOTE — TELEPHONE ENCOUNTER
Spoke to pt stated that she just returned from [facility] and for last couple days felt like she had a sinus infection. Sinus pain and pressure, slight cough, yellow mucous. Pt has taken 2 covid tests over past 2 days, both negative. Pt had friend with antibiotic- Augmentin and has taken that for the past 2 days. Denies any shortness of breath or fever.     appt made with respiratory clinic    Future Appointments   Date Time Provider Alysia Michelle   10/10/2022  3:00 PM CHANNING Avalos [facility]

## 2022-10-10 NOTE — TELEPHONE ENCOUNTER
just back from [place] last night, has tested negative for covid, thinks she has a sinus infection, took a friends agumentin for a couple days, has no more, get a script or be seen, please advise

## 2022-10-10 NOTE — PATIENT INSTRUCTIONS
Rest, increase fluids, salt water gargles ,neti pot (use distilled water) or saline nasal spray, Mucinex-D (behind the counter), Advil cold and sinus (behind the counter), follow up if symptoms persist or increase.

## 2022-10-14 ENCOUNTER — TELEPHONE (OUTPATIENT)
Dept: FAMILY MEDICINE CLINIC | Facility: CLINIC | Age: 72
End: 2022-10-14

## 2022-10-14 ENCOUNTER — VIRTUAL PHONE E/M (OUTPATIENT)
Dept: FAMILY MEDICINE CLINIC | Facility: CLINIC | Age: 72
End: 2022-10-14
Payer: COMMERCIAL

## 2022-10-14 VITALS — WEIGHT: 160 LBS | HEIGHT: 66 IN | BODY MASS INDEX: 25.71 KG/M2

## 2022-10-14 DIAGNOSIS — N76.0 ACUTE VAGINITIS: ICD-10-CM

## 2022-10-14 DIAGNOSIS — N89.8 VAGINAL DISCHARGE: Primary | ICD-10-CM

## 2022-10-14 RX ORDER — FLUCONAZOLE 150 MG/1
150 TABLET ORAL ONCE
Qty: 1 TABLET | Refills: 1 | Status: SHIPPED | OUTPATIENT
Start: 2022-10-14 | End: 2022-10-14

## 2022-10-14 NOTE — PATIENT INSTRUCTIONS
Recommend a dose of Diflucan. Continue with probiotics. Return to clinic next week if no improvement.

## 2022-10-14 NOTE — TELEPHONE ENCOUNTER
Spoke to pt needs to do phone visit    Future Appointments   Date Time Provider Alysia Martinez   10/14/2022  3:30 PM Oli Benton MD [facility]

## 2022-10-14 NOTE — TELEPHONE ENCOUNTER
Patient was seen last week and was given an antibiotic. She has developed a yeast infection and is requesting medication to treat this.     Call back [phone]

## 2022-10-14 NOTE — TELEPHONE ENCOUNTER
Spoke to pt stated that she gets yeast infections easily and that she takes probiotics and doubled the amount while on antibiotics due to concern of getting a yeast infection    Pt has white discharge and itching. Pt has tried OTC monistat before, however stated \"when it is this bad that really burns\"       Please advise, no appt available.

## 2022-10-18 NOTE — TELEPHONE ENCOUNTER
Let pt know the following below. Pt verbalized her understanding and had no other questions at this time. Mirvaso Counseling: Mirvaso is a topical medication which can decrease superficial blood flow where applied. Side effects are uncommon and include stinging, redness and allergic reactions.

## 2022-10-24 RX ORDER — RIZATRIPTAN BENZOATE 10 MG/1
10 TABLET ORAL DAILY PRN
Qty: 9 TABLET | Refills: 1 | Status: SHIPPED | OUTPATIENT
Start: 2022-10-24

## 2022-10-24 NOTE — TELEPHONE ENCOUNTER
Rizatriptan: 10/23/20       Return in 6 months (on 12/16/2022) for follow up. Future appt:    Last Appointment with provider:   6/16/2022  Last appointment at [facility]:  10/10/2022  CHOLESTEROL, TOTAL (mg/dL)   Date Value   06/27/2022 192     HDL CHOLESTEROL (mg/dL)   Date Value   06/27/2022 86     LDL-CHOLESTEROL (mg/dL (calc))   Date Value   06/27/2022 91     TRIGLYCERIDES (mg/dL)   Date Value   06/27/2022 67     No results found for: EAG, A1C  Lab Results   Component Value Date    T4F 1.0 06/27/2022    TSH 2.220 01/07/2020    TSHT4 5.39 (H) 06/27/2022       No follow-ups on file.

## 2022-12-01 ENCOUNTER — TELEPHONE (OUTPATIENT)
Dept: FAMILY MEDICINE CLINIC | Facility: CLINIC | Age: 72
End: 2022-12-01

## 2022-12-01 NOTE — TELEPHONE ENCOUNTER
Patient has tried OTC yeast remedy without improvement. Scheduled for office visit.     Future Appointments   Date Time Provider Alysia Michelle   12/2/2022 11:15 AM CHANNING Arnett [facility] [place] [facility] [place]

## 2022-12-01 NOTE — TELEPHONE ENCOUNTER
Patient thinks she has a vaginal infection, irritated when she walks, red in color  No future appointments.

## 2022-12-02 ENCOUNTER — OFFICE VISIT (OUTPATIENT)
Dept: FAMILY MEDICINE CLINIC | Facility: CLINIC | Age: 72
End: 2022-12-02
Payer: COMMERCIAL

## 2022-12-02 VITALS
HEART RATE: 75 BPM | HEIGHT: 65.5 IN | WEIGHT: 167.63 LBS | DIASTOLIC BLOOD PRESSURE: 68 MMHG | BODY MASS INDEX: 27.59 KG/M2 | SYSTOLIC BLOOD PRESSURE: 120 MMHG | OXYGEN SATURATION: 99 % | RESPIRATION RATE: 16 BRPM | TEMPERATURE: 98 F

## 2022-12-02 DIAGNOSIS — N89.8 VAGINAL DISCHARGE: Primary | ICD-10-CM

## 2022-12-02 PROCEDURE — 3008F BODY MASS INDEX DOCD: CPT | Performed by: NURSE PRACTITIONER

## 2022-12-02 PROCEDURE — 87660 TRICHOMONAS VAGIN DIR PROBE: CPT | Performed by: NURSE PRACTITIONER

## 2022-12-02 PROCEDURE — 87510 GARDNER VAG DNA DIR PROBE: CPT | Performed by: NURSE PRACTITIONER

## 2022-12-02 PROCEDURE — 3074F SYST BP LT 130 MM HG: CPT | Performed by: NURSE PRACTITIONER

## 2022-12-02 PROCEDURE — 3078F DIAST BP <80 MM HG: CPT | Performed by: NURSE PRACTITIONER

## 2022-12-02 PROCEDURE — 87480 CANDIDA DNA DIR PROBE: CPT | Performed by: NURSE PRACTITIONER

## 2022-12-02 PROCEDURE — 99213 OFFICE O/P EST LOW 20 MIN: CPT | Performed by: NURSE PRACTITIONER

## 2022-12-02 RX ORDER — METRONIDAZOLE 7.5 MG/G
1 GEL VAGINAL NIGHTLY
Qty: 70 G | Refills: 0 | Status: SHIPPED | OUTPATIENT
Start: 2022-12-02 | End: 2022-12-07

## 2022-12-02 RX ORDER — FLUCONAZOLE 150 MG/1
150 TABLET ORAL ONCE
COMMUNITY
Start: 2022-10-14

## 2022-12-02 NOTE — PATIENT INSTRUCTIONS
Vaginal swab today  Start metronidazole vaginal insert nightly for 5 nights; can try to apply some of the gel externally to see if it helps with external irritation at vaginal opening

## 2022-12-07 RX ORDER — RIZATRIPTAN BENZOATE 10 MG/1
TABLET ORAL
Qty: 9 TABLET | Refills: 1 | Status: SHIPPED | OUTPATIENT
Start: 2022-12-07

## 2022-12-07 NOTE — TELEPHONE ENCOUNTER
Last Refill: 10/24/2022 #9 with 1 refill    Future appt:    Last Appointment with provider:   6/16/2022  Last appointment at [facility]:  12/2/2022  CHOLESTEROL, TOTAL (mg/dL)   Date Value   06/27/2022 192     HDL CHOLESTEROL (mg/dL)   Date Value   06/27/2022 86     LDL-CHOLESTEROL (mg/dL (calc))   Date Value   06/27/2022 91     TRIGLYCERIDES (mg/dL)   Date Value   06/27/2022 67     No results found for: EAG, A1C  Lab Results   Component Value Date    T4F 1.0 06/27/2022    TSH 2.220 01/07/2020    TSHT4 5.39 (H) 06/27/2022       No follow-ups on file.

## 2022-12-15 ENCOUNTER — TELEPHONE (OUTPATIENT)
Dept: FAMILY MEDICINE CLINIC | Facility: CLINIC | Age: 72
End: 2022-12-15

## 2022-12-15 ENCOUNTER — OFFICE VISIT (OUTPATIENT)
Dept: FAMILY MEDICINE CLINIC | Facility: CLINIC | Age: 72
End: 2022-12-15
Payer: COMMERCIAL

## 2022-12-15 VITALS
BODY MASS INDEX: 27.89 KG/M2 | TEMPERATURE: 98 F | HEART RATE: 72 BPM | WEIGHT: 167.38 LBS | RESPIRATION RATE: 16 BRPM | HEIGHT: 65 IN | DIASTOLIC BLOOD PRESSURE: 80 MMHG | SYSTOLIC BLOOD PRESSURE: 124 MMHG | OXYGEN SATURATION: 99 %

## 2022-12-15 DIAGNOSIS — R09.81 SINUS CONGESTION: ICD-10-CM

## 2022-12-15 DIAGNOSIS — N89.8 VAGINAL DISCHARGE: ICD-10-CM

## 2022-12-15 DIAGNOSIS — N76.0 ACUTE VAGINITIS: Primary | ICD-10-CM

## 2022-12-15 PROCEDURE — 87660 TRICHOMONAS VAGIN DIR PROBE: CPT | Performed by: NURSE PRACTITIONER

## 2022-12-15 PROCEDURE — 99214 OFFICE O/P EST MOD 30 MIN: CPT | Performed by: NURSE PRACTITIONER

## 2022-12-15 PROCEDURE — 3074F SYST BP LT 130 MM HG: CPT | Performed by: NURSE PRACTITIONER

## 2022-12-15 PROCEDURE — 87510 GARDNER VAG DNA DIR PROBE: CPT | Performed by: NURSE PRACTITIONER

## 2022-12-15 PROCEDURE — 3008F BODY MASS INDEX DOCD: CPT | Performed by: NURSE PRACTITIONER

## 2022-12-15 PROCEDURE — 1125F AMNT PAIN NOTED PAIN PRSNT: CPT | Performed by: NURSE PRACTITIONER

## 2022-12-15 PROCEDURE — 3079F DIAST BP 80-89 MM HG: CPT | Performed by: NURSE PRACTITIONER

## 2022-12-15 PROCEDURE — 87480 CANDIDA DNA DIR PROBE: CPT | Performed by: NURSE PRACTITIONER

## 2022-12-15 RX ORDER — METRONIDAZOLE 500 MG/1
500 TABLET ORAL 2 TIMES DAILY
Qty: 14 TABLET | Refills: 0 | Status: SHIPPED | OUTPATIENT
Start: 2022-12-15 | End: 2022-12-22

## 2022-12-15 RX ORDER — FLUCONAZOLE 150 MG/1
TABLET ORAL
Qty: 3 TABLET | Refills: 0 | Status: SHIPPED | OUTPATIENT
Start: 2022-12-15

## 2022-12-15 NOTE — TELEPHONE ENCOUNTER
Patient has questions regarding medications that were ordered today, she is waiting at the pharmacy and lost phone connections

## 2022-12-19 NOTE — PATIENT INSTRUCTIONS
Vaginal swab pending. Start the oral Flagyl on Saturday. Take Diflucan every 4 days. Treat the upper respiratory symptoms with antihistamine and/or nasal steroid. Follow up if not improving or worsens.

## 2023-01-19 ENCOUNTER — OFFICE VISIT (OUTPATIENT)
Dept: FAMILY MEDICINE CLINIC | Facility: CLINIC | Age: 73
End: 2023-01-19
Payer: MEDICARE

## 2023-01-19 VITALS
RESPIRATION RATE: 16 BRPM | TEMPERATURE: 97 F | WEIGHT: 170.38 LBS | HEIGHT: 65 IN | DIASTOLIC BLOOD PRESSURE: 68 MMHG | SYSTOLIC BLOOD PRESSURE: 108 MMHG | OXYGEN SATURATION: 99 % | HEART RATE: 82 BPM | BODY MASS INDEX: 28.39 KG/M2

## 2023-01-19 DIAGNOSIS — N89.8 VAGINAL DISCHARGE: ICD-10-CM

## 2023-01-19 DIAGNOSIS — M54.16 LUMBAR RADICULOPATHY: Primary | ICD-10-CM

## 2023-01-19 PROCEDURE — 3008F BODY MASS INDEX DOCD: CPT | Performed by: NURSE PRACTITIONER

## 2023-01-19 PROCEDURE — 1125F AMNT PAIN NOTED PAIN PRSNT: CPT | Performed by: NURSE PRACTITIONER

## 2023-01-19 PROCEDURE — 3078F DIAST BP <80 MM HG: CPT | Performed by: NURSE PRACTITIONER

## 2023-01-19 PROCEDURE — 3074F SYST BP LT 130 MM HG: CPT | Performed by: NURSE PRACTITIONER

## 2023-01-19 PROCEDURE — 99214 OFFICE O/P EST MOD 30 MIN: CPT | Performed by: NURSE PRACTITIONER

## 2023-01-19 RX ORDER — MELOXICAM 15 MG/1
15 TABLET ORAL DAILY
Qty: 7 TABLET | Refills: 0 | Status: SHIPPED | OUTPATIENT
Start: 2023-01-19 | End: 2023-01-26

## 2023-01-19 RX ORDER — CYCLOBENZAPRINE HCL 10 MG
10 TABLET ORAL 2 TIMES DAILY PRN
Qty: 15 TABLET | Refills: 0 | Status: SHIPPED | OUTPATIENT
Start: 2023-01-19

## 2023-01-19 RX ORDER — METRONIDAZOLE 7.5 MG/G
GEL VAGINAL
COMMUNITY
Start: 2022-12-30

## 2023-01-19 NOTE — PATIENT INSTRUCTIONS
Xrays lumbar, sacral spine tomorrow  Vaginal swab tomorrow, no metrogel in tonight  Meloxicam 15mg once a day for seven days; take with food  Cyclobenzaprine (flexeril, muscle relaxer) twice a day if needed; can cause sedation  Ice followed by warm moist heat to low back, light stretching, good support shoes    Apply A&D ointment externally if irritated   Lukewarm water pour over vaginal when urinating  Await vaginal swab  No creams/ointments/medications in the vagina at this time  Continue with cotton underwear

## 2023-01-20 ENCOUNTER — OFFICE VISIT (OUTPATIENT)
Dept: FAMILY MEDICINE CLINIC | Facility: CLINIC | Age: 73
End: 2023-01-20
Payer: MEDICARE

## 2023-01-20 ENCOUNTER — HOSPITAL ENCOUNTER (OUTPATIENT)
Dept: GENERAL RADIOLOGY | Age: 73
Discharge: HOME | End: 2023-01-20
Attending: NURSE PRACTITIONER
Payer: MEDICARE

## 2023-01-20 VITALS
OXYGEN SATURATION: 98 % | SYSTOLIC BLOOD PRESSURE: 130 MMHG | RESPIRATION RATE: 16 BRPM | DIASTOLIC BLOOD PRESSURE: 84 MMHG | HEIGHT: 65 IN | BODY MASS INDEX: 28.32 KG/M2 | TEMPERATURE: 98 F | HEART RATE: 86 BPM | WEIGHT: 170 LBS

## 2023-01-20 DIAGNOSIS — M54.16 LUMBAR RADICULOPATHY: ICD-10-CM

## 2023-01-20 DIAGNOSIS — N89.8 VAGINAL DISCHARGE: Primary | ICD-10-CM

## 2023-01-20 PROCEDURE — 3075F SYST BP GE 130 - 139MM HG: CPT | Performed by: NURSE PRACTITIONER

## 2023-01-20 PROCEDURE — 87510 GARDNER VAG DNA DIR PROBE: CPT | Performed by: NURSE PRACTITIONER

## 2023-01-20 PROCEDURE — 3079F DIAST BP 80-89 MM HG: CPT | Performed by: NURSE PRACTITIONER

## 2023-01-20 PROCEDURE — 99212 OFFICE O/P EST SF 10 MIN: CPT | Performed by: NURSE PRACTITIONER

## 2023-01-20 PROCEDURE — 87480 CANDIDA DNA DIR PROBE: CPT | Performed by: NURSE PRACTITIONER

## 2023-01-20 PROCEDURE — 72220 X-RAY EXAM SACRUM TAILBONE: CPT | Performed by: NURSE PRACTITIONER

## 2023-01-20 PROCEDURE — 72110 X-RAY EXAM L-2 SPINE 4/>VWS: CPT | Performed by: NURSE PRACTITIONER

## 2023-01-20 PROCEDURE — 3008F BODY MASS INDEX DOCD: CPT | Performed by: NURSE PRACTITIONER

## 2023-01-20 PROCEDURE — 87660 TRICHOMONAS VAGIN DIR PROBE: CPT | Performed by: NURSE PRACTITIONER

## 2023-01-20 RX ORDER — FLUCONAZOLE 100 MG/1
100 TABLET ORAL DAILY
Qty: 3 TABLET | Refills: 0 | Status: SHIPPED | OUTPATIENT
Start: 2023-01-20 | End: 2023-01-23

## 2023-01-20 NOTE — PATIENT INSTRUCTIONS
Vaginal swab today  Diflucan 100mg daily for three days  A&D ointment externally to vagina (do not insert)  Lukewarm water over vagina when urinating; pat dry  Loose fitting clothing and underwear

## 2023-02-03 ENCOUNTER — TELEPHONE (OUTPATIENT)
Dept: FAMILY MEDICINE CLINIC | Facility: CLINIC | Age: 73
End: 2023-02-03

## 2023-02-03 ENCOUNTER — OFFICE VISIT (OUTPATIENT)
Dept: FAMILY MEDICINE CLINIC | Facility: CLINIC | Age: 73
End: 2023-02-03
Payer: MEDICARE

## 2023-02-03 VITALS
DIASTOLIC BLOOD PRESSURE: 80 MMHG | HEART RATE: 73 BPM | BODY MASS INDEX: 28 KG/M2 | HEIGHT: 65 IN | SYSTOLIC BLOOD PRESSURE: 146 MMHG | RESPIRATION RATE: 18 BRPM | OXYGEN SATURATION: 98 % | TEMPERATURE: 101 F

## 2023-02-03 DIAGNOSIS — H10.32 ACUTE BACTERIAL CONJUNCTIVITIS OF LEFT EYE: Primary | ICD-10-CM

## 2023-02-03 DIAGNOSIS — H57.89 RED EYE: ICD-10-CM

## 2023-02-03 PROCEDURE — 3079F DIAST BP 80-89 MM HG: CPT

## 2023-02-03 PROCEDURE — 3008F BODY MASS INDEX DOCD: CPT

## 2023-02-03 PROCEDURE — 99214 OFFICE O/P EST MOD 30 MIN: CPT

## 2023-02-03 PROCEDURE — 3077F SYST BP >= 140 MM HG: CPT

## 2023-02-03 RX ORDER — CIPROFLOXACIN HYDROCHLORIDE 3.5 MG/ML
2 SOLUTION/ DROPS TOPICAL
Qty: 1 EACH | Refills: 0 | Status: SHIPPED | OUTPATIENT
Start: 2023-02-03

## 2023-02-03 RX ORDER — BACITRACIN 500 [USP'U]/G
1 OINTMENT OPHTHALMIC EVERY 4 HOURS
Qty: 1 EACH | Refills: 0 | Status: CANCELLED | OUTPATIENT
Start: 2023-02-03

## 2023-02-03 RX ORDER — TOBRAMYCIN 3 MG/ML
2 SOLUTION/ DROPS OPHTHALMIC EVERY 4 HOURS
Qty: 1 EACH | Refills: 0 | Status: CANCELLED | OUTPATIENT
Start: 2023-02-03

## 2023-02-03 RX ORDER — POLYMYXIN B SULFATE AND TRIMETHOPRIM 1; 10000 MG/ML; [USP'U]/ML
1 SOLUTION OPHTHALMIC EVERY 6 HOURS PRN
COMMUNITY
Start: 2023-02-01

## 2023-02-03 NOTE — TELEPHONE ENCOUNTER
Patient states no one has ointment in stock and Jose does have it in drop form. Please send a revised rx to [facility] in [facility] on [address].

## 2023-02-03 NOTE — TELEPHONE ENCOUNTER
The ciprofloxacin will have to be ordered as an ointment. I apologize for the confusion. Continue plan of care as discussed.

## 2023-02-03 NOTE — TELEPHONE ENCOUNTER
Patient went to pharmacy expecting eye drops, but Rx called in was for ointment. Need clarification.

## 2023-02-03 NOTE — PATIENT INSTRUCTIONS
Place abx eye drop every 2 hours for the first 2 days. 4 hours for the days afterwards please do it for at least 7 days.

## 2023-03-24 ENCOUNTER — TELEMEDICINE (OUTPATIENT)
Dept: FAMILY MEDICINE CLINIC | Facility: CLINIC | Age: 73
End: 2023-03-24
Payer: MEDICARE

## 2023-03-24 ENCOUNTER — TELEPHONE (OUTPATIENT)
Dept: FAMILY MEDICINE CLINIC | Facility: CLINIC | Age: 73
End: 2023-03-24

## 2023-03-24 VITALS
WEIGHT: 170 LBS | TEMPERATURE: 98 F | DIASTOLIC BLOOD PRESSURE: 92 MMHG | HEART RATE: 73 BPM | SYSTOLIC BLOOD PRESSURE: 161 MMHG | HEIGHT: 66 IN | BODY MASS INDEX: 27.32 KG/M2 | OXYGEN SATURATION: 99 %

## 2023-03-24 DIAGNOSIS — U07.1 COVID: Primary | ICD-10-CM

## 2023-03-24 DIAGNOSIS — R03.0 ELEVATED BLOOD PRESSURE READING WITHOUT DIAGNOSIS OF HYPERTENSION: ICD-10-CM

## 2023-03-24 DIAGNOSIS — R09.81 NASAL CONGESTION: ICD-10-CM

## 2023-03-24 DIAGNOSIS — R05.1 ACUTE COUGH: ICD-10-CM

## 2023-03-24 PROCEDURE — 99214 OFFICE O/P EST MOD 30 MIN: CPT | Performed by: FAMILY MEDICINE

## 2023-03-24 NOTE — PATIENT INSTRUCTIONS
Continue current medications. Start plaxlovid. Stop trazodone for 5 days while taking paxlovid. Use tylenol as needed   Monitor oxygen level and temperature. Monitor your blood pressure. Return to clinic if systolic blood pressure more than 844 or diastolic more than 404. Low salt diet.

## 2023-03-24 NOTE — TELEPHONE ENCOUNTER
Spoke to pt took home covid test today  tested positive, symptoms started on Tuesday. Pulse OX 99%  Pt symptoms:   cough, sore throat, nasal congestion, chest congestion, wheezing. Denies shortness of breath, chest pain    Pt asking for paxlovid and cough medication.      Please advise on video visit with you or another provider

## 2023-03-24 NOTE — TELEPHONE ENCOUNTER
appt made    Future Appointments   Date Time Provider Alysia Michelle   3/24/2023  3:30 PM Francesco Sibley MD [facility] [place] [facility]

## 2023-03-24 NOTE — TELEPHONE ENCOUNTER
Recommend video visit at 3:30 PM today if it is still available, otherwise patient may schedule with available provider.

## 2023-03-31 NOTE — PATIENT INSTRUCTIONS
Will send vaginal swabs. If vaginal swabs negative and pelvic pressure still persists, will order for pelvic ultrasound. Keep your appointment with Dr. Emily Jones.
[employer]

## 2023-06-01 ENCOUNTER — OFFICE VISIT (OUTPATIENT)
Dept: FAMILY MEDICINE CLINIC | Facility: CLINIC | Age: 73
End: 2023-06-01
Payer: MEDICARE

## 2023-06-01 VITALS
RESPIRATION RATE: 18 BRPM | TEMPERATURE: 97 F | OXYGEN SATURATION: 98 % | HEIGHT: 65 IN | HEART RATE: 76 BPM | BODY MASS INDEX: 25.46 KG/M2 | SYSTOLIC BLOOD PRESSURE: 116 MMHG | WEIGHT: 152.81 LBS | DIASTOLIC BLOOD PRESSURE: 74 MMHG

## 2023-06-01 DIAGNOSIS — F33.1 MODERATE EPISODE OF RECURRENT MAJOR DEPRESSIVE DISORDER (HCC): ICD-10-CM

## 2023-06-01 DIAGNOSIS — D32.0 BENIGN NEOPLASM OF CEREBRAL MENINGES (HCC): ICD-10-CM

## 2023-06-01 DIAGNOSIS — Z00.00 ENCOUNTER FOR ANNUAL HEALTH EXAMINATION: ICD-10-CM

## 2023-06-01 DIAGNOSIS — E78.49 FAMILIAL MULTIPLE LIPOPROTEIN-TYPE HYPERLIPIDEMIA: ICD-10-CM

## 2023-06-01 DIAGNOSIS — M85.80 OSTEOPENIA, UNSPECIFIED LOCATION: ICD-10-CM

## 2023-06-01 DIAGNOSIS — G43.009 MIGRAINE WITHOUT AURA AND WITHOUT STATUS MIGRAINOSUS, NOT INTRACTABLE: ICD-10-CM

## 2023-06-01 DIAGNOSIS — Z00.00 ENCOUNTER FOR MEDICARE ANNUAL WELLNESS EXAM: Primary | ICD-10-CM

## 2023-06-01 DIAGNOSIS — G47.00 INSOMNIA, UNSPECIFIED TYPE: ICD-10-CM

## 2023-06-01 DIAGNOSIS — F41.9 ANXIETY: ICD-10-CM

## 2023-06-01 DIAGNOSIS — E53.8 B12 DEFICIENCY: ICD-10-CM

## 2023-06-01 DIAGNOSIS — R82.90 ABNORMAL URINE: ICD-10-CM

## 2023-06-01 DIAGNOSIS — D64.9 ANEMIA, UNSPECIFIED TYPE: ICD-10-CM

## 2023-06-01 DIAGNOSIS — E55.9 VITAMIN D DEFICIENCY: ICD-10-CM

## 2023-06-01 PROBLEM — R03.0 ELEVATED BLOOD PRESSURE READING WITHOUT DIAGNOSIS OF HYPERTENSION: Status: RESOLVED | Noted: 2023-03-24 | Resolved: 2023-06-01

## 2023-06-01 PROBLEM — N28.89 RENAL MASS: Status: RESOLVED | Noted: 2020-06-12 | Resolved: 2023-06-01

## 2023-06-01 PROBLEM — K59.00 CONSTIPATION: Status: RESOLVED | Noted: 2020-09-20 | Resolved: 2023-06-01

## 2023-06-01 PROCEDURE — G0439 PPPS, SUBSEQ VISIT: HCPCS | Performed by: FAMILY MEDICINE

## 2023-06-01 PROCEDURE — 1170F FXNL STATUS ASSESSED: CPT | Performed by: FAMILY MEDICINE

## 2023-06-01 PROCEDURE — 3074F SYST BP LT 130 MM HG: CPT | Performed by: FAMILY MEDICINE

## 2023-06-01 PROCEDURE — 1159F MED LIST DOCD IN RCRD: CPT | Performed by: FAMILY MEDICINE

## 2023-06-01 PROCEDURE — 3078F DIAST BP <80 MM HG: CPT | Performed by: FAMILY MEDICINE

## 2023-06-01 PROCEDURE — 1160F RVW MEDS BY RX/DR IN RCRD: CPT | Performed by: FAMILY MEDICINE

## 2023-06-01 PROCEDURE — 1126F AMNT PAIN NOTED NONE PRSNT: CPT | Performed by: FAMILY MEDICINE

## 2023-06-01 PROCEDURE — 3008F BODY MASS INDEX DOCD: CPT | Performed by: FAMILY MEDICINE

## 2023-06-01 PROCEDURE — 96160 PT-FOCUSED HLTH RISK ASSMT: CPT | Performed by: FAMILY MEDICINE

## 2023-06-01 RX ORDER — MULTIVIT-MIN/IRON/FOLIC ACID/K 18-600-40
CAPSULE ORAL
COMMUNITY

## 2023-06-09 LAB
ABSOLUTE BASOPHILS: 40 CELLS/UL (ref 0–200)
ABSOLUTE EOSINOPHILS: 88 CELLS/UL (ref 15–500)
ABSOLUTE LYMPHOCYTES: 1619 CELLS/UL (ref 850–3900)
ABSOLUTE MONOCYTES: 370 CELLS/UL (ref 200–950)
ABSOLUTE NEUTROPHILS: 2284 CELLS/UL (ref 1500–7800)
ALBUMIN/GLOBULIN RATIO: 1.7 (CALC) (ref 1–2.5)
ALBUMIN: 4.2 G/DL (ref 3.6–5.1)
ALKALINE PHOSPHATASE: 49 U/L (ref 37–153)
ALT: 10 U/L (ref 6–29)
APPEARANCE: CLEAR
AST: 20 U/L (ref 10–35)
BASOPHILS: 0.9 %
BILIRUBIN, TOTAL: 0.5 MG/DL (ref 0.2–1.2)
BILIRUBIN: NEGATIVE
BUN: 10 MG/DL (ref 7–25)
CALCIUM: 9.8 MG/DL (ref 8.6–10.4)
CARBON DIOXIDE: 29 MMOL/L (ref 20–32)
CHLORIDE: 100 MMOL/L (ref 98–110)
CHOL/HDLC RATIO: 2.6 (CALC)
CHOLESTEROL, TOTAL: 178 MG/DL
COLOR: YELLOW
CREATININE: 0.95 MG/DL (ref 0.6–1)
EGFR: 64 ML/MIN/1.73M2
EOSINOPHILS: 2 %
GLOBULIN: 2.5 G/DL (CALC) (ref 1.9–3.7)
GLUCOSE: 88 MG/DL (ref 65–99)
GLUCOSE: NEGATIVE
HDL CHOLESTEROL: 69 MG/DL
HEMATOCRIT: 33.9 % (ref 35–45)
HEMOGLOBIN: 11 G/DL (ref 11.7–15.5)
KETONES: NEGATIVE
LDL-CHOLESTEROL: 94 MG/DL (CALC)
LEUKOCYTE ESTERASE: NEGATIVE
LYMPHOCYTES: 36.8 %
MCH: 30 PG (ref 27–33)
MCHC: 32.4 G/DL (ref 32–36)
MCV: 92.4 FL (ref 80–100)
MONOCYTES: 8.4 %
MPV: 10.3 FL (ref 7.5–12.5)
NEUTROPHILS: 51.9 %
NITRITE: NEGATIVE
NON-HDL CHOLESTEROL: 109 MG/DL (CALC)
OCCULT BLOOD: NEGATIVE
PH: 6.5 (ref 5–8)
PLATELET COUNT: 292 THOUSAND/UL (ref 140–400)
POTASSIUM: 4.7 MMOL/L (ref 3.5–5.3)
PROTEIN, TOTAL: 6.7 G/DL (ref 6.1–8.1)
PROTEIN: NEGATIVE
RDW: 12.4 % (ref 11–15)
RED BLOOD CELL COUNT: 3.67 MILLION/UL (ref 3.8–5.1)
SODIUM: 136 MMOL/L (ref 135–146)
SPECIFIC GRAVITY: 1 (ref 1–1.03)
TRIGLYCERIDES: 68 MG/DL
TSH W/REFLEX TO FT4: 3.55 MIU/L (ref 0.4–4.5)
VITAMIN B12: 635 PG/ML (ref 200–1100)
VITAMIN D, 25-OH, TOTAL: 86 NG/ML (ref 30–100)
WHITE BLOOD CELL COUNT: 4.4 THOUSAND/UL (ref 3.8–10.8)

## 2023-06-24 ENCOUNTER — WALK IN (OUTPATIENT)
Dept: URGENT CARE | Age: 73
End: 2023-06-24
Attending: FAMILY MEDICINE

## 2023-06-24 VITALS
BODY MASS INDEX: 23.46 KG/M2 | WEIGHT: 146 LBS | DIASTOLIC BLOOD PRESSURE: 78 MMHG | HEART RATE: 71 BPM | SYSTOLIC BLOOD PRESSURE: 121 MMHG | TEMPERATURE: 97.5 F | HEIGHT: 66 IN | OXYGEN SATURATION: 100 % | RESPIRATION RATE: 16 BRPM

## 2023-06-24 DIAGNOSIS — H00.015 HORDEOLUM EXTERNUM OF LEFT LOWER EYELID: Primary | ICD-10-CM

## 2023-06-24 DIAGNOSIS — H10.9 CONJUNCTIVITIS OF LEFT EYE, UNSPECIFIED CONJUNCTIVITIS TYPE: ICD-10-CM

## 2023-06-24 PROCEDURE — 99203 OFFICE O/P NEW LOW 30 MIN: CPT

## 2023-06-24 RX ORDER — TOBRAMYCIN 3 MG/ML
1 SOLUTION/ DROPS OPHTHALMIC EVERY 4 HOURS
Qty: 5 ML | Refills: 0 | Status: SHIPPED | OUTPATIENT
Start: 2023-06-24 | End: 2023-06-29

## 2023-06-24 RX ORDER — TRAZODONE HYDROCHLORIDE 50 MG/1
TABLET ORAL
COMMUNITY
Start: 2023-06-18

## 2023-06-24 RX ORDER — POLYETHYLENE GLYCOL 3350 17 G/17G
17 POWDER, FOR SOLUTION ORAL DAILY
COMMUNITY

## 2023-06-24 RX ORDER — OSPEMIFENE 60 MG/1
TABLET, FILM COATED ORAL DAILY
COMMUNITY
Start: 2023-06-14

## 2023-06-24 RX ORDER — CITALOPRAM 20 MG/1
20 TABLET ORAL DAILY
COMMUNITY
Start: 2023-06-19

## 2023-06-24 ASSESSMENT — PAIN SCALES - GENERAL: PAINLEVEL: 6

## 2023-06-24 ASSESSMENT — VISUAL ACUITY
OS_CC: 20/40
OD_CC: 20/25

## 2023-06-27 ENCOUNTER — LAB ENCOUNTER (OUTPATIENT)
Dept: LAB | Age: 73
End: 2023-06-27
Attending: NURSE PRACTITIONER
Payer: MEDICARE

## 2023-06-27 ENCOUNTER — OFFICE VISIT (OUTPATIENT)
Dept: FAMILY MEDICINE CLINIC | Facility: CLINIC | Age: 73
End: 2023-06-27
Payer: MEDICARE

## 2023-06-27 VITALS
SYSTOLIC BLOOD PRESSURE: 118 MMHG | HEIGHT: 65 IN | RESPIRATION RATE: 16 BRPM | WEIGHT: 150 LBS | TEMPERATURE: 98 F | BODY MASS INDEX: 24.99 KG/M2 | HEART RATE: 62 BPM | DIASTOLIC BLOOD PRESSURE: 74 MMHG | OXYGEN SATURATION: 99 %

## 2023-06-27 DIAGNOSIS — H10.32 ACUTE BACTERIAL CONJUNCTIVITIS OF LEFT EYE: ICD-10-CM

## 2023-06-27 DIAGNOSIS — L03.211 CELLULITIS OF FACE: ICD-10-CM

## 2023-06-27 DIAGNOSIS — D50.9 IRON DEFICIENCY ANEMIA, UNSPECIFIED IRON DEFICIENCY ANEMIA TYPE: Primary | ICD-10-CM

## 2023-06-27 LAB
BASOPHILS # BLD AUTO: 0.03 X10(3) UL (ref 0–0.2)
BASOPHILS NFR BLD AUTO: 0.5 %
DEPRECATED HBV CORE AB SER IA-ACNC: 57.7 NG/ML
EOSINOPHIL # BLD AUTO: 0.1 X10(3) UL (ref 0–0.7)
EOSINOPHIL NFR BLD AUTO: 1.6 %
ERYTHROCYTE [DISTWIDTH] IN BLOOD BY AUTOMATED COUNT: 13.1 %
HCT VFR BLD AUTO: 34.5 %
HGB BLD-MCNC: 10.8 G/DL
IMM GRANULOCYTES # BLD AUTO: 0.01 X10(3) UL (ref 0–1)
IMM GRANULOCYTES NFR BLD: 0.2 %
IRON SATN MFR SERPL: 22 %
IRON SERPL-MCNC: 79 UG/DL
LYMPHOCYTES # BLD AUTO: 1.98 X10(3) UL (ref 1–4)
LYMPHOCYTES NFR BLD AUTO: 31.4 %
MCH RBC QN AUTO: 30.3 PG (ref 26–34)
MCHC RBC AUTO-ENTMCNC: 31.3 G/DL (ref 31–37)
MCV RBC AUTO: 96.9 FL
MONOCYTES # BLD AUTO: 0.48 X10(3) UL (ref 0.1–1)
MONOCYTES NFR BLD AUTO: 7.6 %
NEUTROPHILS # BLD AUTO: 3.71 X10 (3) UL (ref 1.5–7.7)
NEUTROPHILS # BLD AUTO: 3.71 X10(3) UL (ref 1.5–7.7)
NEUTROPHILS NFR BLD AUTO: 58.7 %
PLATELET # BLD AUTO: 283 10(3)UL (ref 150–450)
RBC # BLD AUTO: 3.56 X10(6)UL
TIBC SERPL-MCNC: 352 UG/DL (ref 240–450)
TRANSFERRIN SERPL-MCNC: 236 MG/DL (ref 200–360)
WBC # BLD AUTO: 6.3 X10(3) UL (ref 4–11)

## 2023-06-27 PROCEDURE — 3008F BODY MASS INDEX DOCD: CPT | Performed by: NURSE PRACTITIONER

## 2023-06-27 PROCEDURE — 3074F SYST BP LT 130 MM HG: CPT | Performed by: NURSE PRACTITIONER

## 2023-06-27 PROCEDURE — 3078F DIAST BP <80 MM HG: CPT | Performed by: NURSE PRACTITIONER

## 2023-06-27 PROCEDURE — 1170F FXNL STATUS ASSESSED: CPT | Performed by: NURSE PRACTITIONER

## 2023-06-27 PROCEDURE — 1159F MED LIST DOCD IN RCRD: CPT | Performed by: NURSE PRACTITIONER

## 2023-06-27 PROCEDURE — 82728 ASSAY OF FERRITIN: CPT | Performed by: NURSE PRACTITIONER

## 2023-06-27 PROCEDURE — 85025 COMPLETE CBC W/AUTO DIFF WBC: CPT | Performed by: NURSE PRACTITIONER

## 2023-06-27 PROCEDURE — 99214 OFFICE O/P EST MOD 30 MIN: CPT | Performed by: NURSE PRACTITIONER

## 2023-06-27 PROCEDURE — 1160F RVW MEDS BY RX/DR IN RCRD: CPT | Performed by: NURSE PRACTITIONER

## 2023-06-27 PROCEDURE — 83540 ASSAY OF IRON: CPT | Performed by: NURSE PRACTITIONER

## 2023-06-27 PROCEDURE — 83550 IRON BINDING TEST: CPT | Performed by: NURSE PRACTITIONER

## 2023-06-27 PROCEDURE — 36415 COLL VENOUS BLD VENIPUNCTURE: CPT | Performed by: NURSE PRACTITIONER

## 2023-06-27 RX ORDER — CEPHALEXIN 500 MG/1
500 CAPSULE ORAL 3 TIMES DAILY
Qty: 30 CAPSULE | Refills: 0 | Status: SHIPPED | OUTPATIENT
Start: 2023-06-27 | End: 2023-07-07

## 2023-06-27 RX ORDER — TOBRAMYCIN 3 MG/ML
1 SOLUTION/ DROPS OPHTHALMIC EVERY 4 HOURS
COMMUNITY
Start: 2023-06-24 | End: 2023-06-29

## 2023-06-28 DIAGNOSIS — D64.9 ANEMIA, UNSPECIFIED TYPE: Primary | ICD-10-CM

## 2023-06-29 PROCEDURE — 82274 ASSAY TEST FOR BLOOD FECAL: CPT | Performed by: NURSE PRACTITIONER

## 2023-07-07 ENCOUNTER — TELEPHONE (OUTPATIENT)
Dept: FAMILY MEDICINE CLINIC | Facility: CLINIC | Age: 73
End: 2023-07-07

## 2023-07-07 DIAGNOSIS — D50.9 IRON DEFICIENCY ANEMIA, UNSPECIFIED IRON DEFICIENCY ANEMIA TYPE: Primary | ICD-10-CM

## 2023-07-07 LAB — HEMOCCULT STL QL: NEGATIVE

## 2023-07-07 NOTE — TELEPHONE ENCOUNTER
Spoke to Ginger Julio with lab. Stated that the wrong order number was put in for the test card that was sent. Stated needs to be [id number]. New order pended.

## 2023-07-08 DIAGNOSIS — D50.9 IRON DEFICIENCY ANEMIA, UNSPECIFIED IRON DEFICIENCY ANEMIA TYPE: Primary | ICD-10-CM

## 2023-07-10 ENCOUNTER — TELEPHONE (OUTPATIENT)
Dept: FAMILY MEDICINE CLINIC | Facility: CLINIC | Age: 73
End: 2023-07-10

## 2023-07-10 NOTE — TELEPHONE ENCOUNTER
----- Message from CHANNING Anderson sent at 7/8/2023 12:46 PM CDT -----  Please notify patient that her stool card was negative for blood-I would recommend a repeat CBC to see where her hemoglobin is-please have patient schedule as soon as she can. - order sent to [facility]

## 2023-07-18 LAB
ABSOLUTE BASOPHILS: 42 CELLS/UL (ref 0–200)
ABSOLUTE EOSINOPHILS: 111 CELLS/UL (ref 15–500)
ABSOLUTE LYMPHOCYTES: 2131 CELLS/UL (ref 850–3900)
ABSOLUTE MONOCYTES: 451 CELLS/UL (ref 200–950)
ABSOLUTE NEUTROPHILS: 2565 CELLS/UL (ref 1500–7800)
BASOPHILS: 0.8 %
EOSINOPHILS: 2.1 %
HEMATOCRIT: 31.8 % (ref 35–45)
HEMOGLOBIN: 10.7 G/DL (ref 11.7–15.5)
LYMPHOCYTES: 40.2 %
MCH: 31.7 PG (ref 27–33)
MCHC: 33.6 G/DL (ref 32–36)
MCV: 94.1 FL (ref 80–100)
MONOCYTES: 8.5 %
MPV: 10.5 FL (ref 7.5–12.5)
NEUTROPHILS: 48.4 %
PLATELET COUNT: 273 THOUSAND/UL (ref 140–400)
RDW: 12.2 % (ref 11–15)
RED BLOOD CELL COUNT: 3.38 MILLION/UL (ref 3.8–5.1)
WHITE BLOOD CELL COUNT: 5.3 THOUSAND/UL (ref 3.8–10.8)

## 2023-07-28 ENCOUNTER — TELEPHONE (OUTPATIENT)
Dept: FAMILY MEDICINE CLINIC | Facility: CLINIC | Age: 73
End: 2023-07-28

## 2023-07-28 NOTE — TELEPHONE ENCOUNTER
Returning your call. Patient will call back on 8/3/2023. She is in [place] and is not able to log into her MyChart.

## 2023-08-01 ENCOUNTER — TELEPHONE (OUTPATIENT)
Dept: FAMILY MEDICINE CLINIC | Facility: CLINIC | Age: 73
End: 2023-08-01

## 2023-08-01 NOTE — TELEPHONE ENCOUNTER
Agree -patient needs to be evaluated. Follow-up for appointments as scheduled otherwise.   Please have patient check for a rash as shingles can cause pain this way-if so-would need shingles treatment as soon as possible (acyclovir or valacyclovir)

## 2023-08-01 NOTE — TELEPHONE ENCOUNTER
Patient states that he has been having back pain, wants to make an appt for the third or after. No openings. [place].

## 2023-08-01 NOTE — TELEPHONE ENCOUNTER
Spoke to pt stated that she is currently in [place]. Pt stated that she has been having lower back pain-middle of back only and only when touched is it painful. Stated started about a week ago. Denied any injury, no BM concern or urinary concerns, no OTC for pain. Advised appt, advised to seek medical assistance if any changes in symptoms. Pt agreed to plan.      Future Appointments   Date Time Provider Alyisa Martinez   8/7/2023 10:30 AM CHANNING Mayorga [facility] [place] [facility]

## 2023-08-03 ENCOUNTER — OFFICE VISIT (OUTPATIENT)
Dept: FAMILY MEDICINE CLINIC | Facility: CLINIC | Age: 73
End: 2023-08-03
Payer: MEDICARE

## 2023-08-03 VITALS
BODY MASS INDEX: 25.93 KG/M2 | RESPIRATION RATE: 16 BRPM | SYSTOLIC BLOOD PRESSURE: 128 MMHG | HEART RATE: 70 BPM | DIASTOLIC BLOOD PRESSURE: 80 MMHG | OXYGEN SATURATION: 100 % | TEMPERATURE: 98 F | HEIGHT: 65 IN | WEIGHT: 155.63 LBS

## 2023-08-03 DIAGNOSIS — D64.9 ANEMIA, UNSPECIFIED TYPE: ICD-10-CM

## 2023-08-03 DIAGNOSIS — M54.50 ACUTE LEFT-SIDED LOW BACK PAIN WITHOUT SCIATICA: Primary | ICD-10-CM

## 2023-08-03 PROCEDURE — 99214 OFFICE O/P EST MOD 30 MIN: CPT | Performed by: NURSE PRACTITIONER

## 2023-08-03 PROCEDURE — 1160F RVW MEDS BY RX/DR IN RCRD: CPT | Performed by: NURSE PRACTITIONER

## 2023-08-03 PROCEDURE — 3008F BODY MASS INDEX DOCD: CPT | Performed by: NURSE PRACTITIONER

## 2023-08-03 PROCEDURE — 3079F DIAST BP 80-89 MM HG: CPT | Performed by: NURSE PRACTITIONER

## 2023-08-03 PROCEDURE — 1170F FXNL STATUS ASSESSED: CPT | Performed by: NURSE PRACTITIONER

## 2023-08-03 PROCEDURE — 1159F MED LIST DOCD IN RCRD: CPT | Performed by: NURSE PRACTITIONER

## 2023-08-03 PROCEDURE — 1125F AMNT PAIN NOTED PAIN PRSNT: CPT | Performed by: NURSE PRACTITIONER

## 2023-08-03 PROCEDURE — 3074F SYST BP LT 130 MM HG: CPT | Performed by: NURSE PRACTITIONER

## 2023-08-03 NOTE — PATIENT INSTRUCTIONS
Take Ibuprofen 600mg three times a day with food, or aleve 2 pills twice a day with food.  -1-2 weeks     Follow up for an x-ray  at [facility]    Follow up with hematologist       Follow up with physical therapy after x-ray     Follow up with Compa Cortez NP or DR. Navarrete for restless leg

## 2023-08-09 ENCOUNTER — TELEPHONE (OUTPATIENT)
Dept: FAMILY MEDICINE CLINIC | Facility: CLINIC | Age: 73
End: 2023-08-09

## 2023-08-09 NOTE — TELEPHONE ENCOUNTER
Please notify patient that I received the x-ray report from [address] [place] [address]. Shows that she has slight curvature to her upper to mid lumbar spine to the right (scoliosis) also she has mild degenerative changes (some disc space narrowing at L1-2, L3-4, and L5-1 also some facet degenerative changes seen in the mid to lower spine most prominent to right L5-S1. Patient to follow-up with physical therapy as planned follow-up if symptoms persist or increase.

## 2023-09-07 ENCOUNTER — TELEPHONE (OUTPATIENT)
Dept: FAMILY MEDICINE CLINIC | Facility: CLINIC | Age: 73
End: 2023-09-07

## 2023-09-07 RX ORDER — CELECOXIB 200 MG/1
200 CAPSULE ORAL 2 TIMES DAILY
Qty: 120 CAPSULE | Refills: 0 | Status: SHIPPED | OUTPATIENT
Start: 2023-09-07 | End: 2023-09-08 | Stop reason: DRUGHIGH

## 2023-09-07 NOTE — TELEPHONE ENCOUNTER
Was seen a few weeks ago, would like to try celebrex for her pain, [facility]

## 2023-09-08 ENCOUNTER — TELEPHONE (OUTPATIENT)
Dept: FAMILY MEDICINE CLINIC | Facility: CLINIC | Age: 73
End: 2023-09-08

## 2023-09-08 RX ORDER — CELECOXIB 200 MG/1
200 CAPSULE ORAL DAILY
COMMUNITY

## 2023-09-08 NOTE — TELEPHONE ENCOUNTER
Pt was given RX for Celebrex 200mg on 9/7/23. Directions per EL state pt to take BID. Pt is just starting RX-  Should pt start taking RX once daily?      Routed to PCP

## 2023-09-08 NOTE — TELEPHONE ENCOUNTER
Yes, recommended to take once a day with food. Return to clinic if no improvement in pain or any concerns.

## 2023-11-07 ENCOUNTER — WALK IN (OUTPATIENT)
Dept: URGENT CARE | Age: 73
End: 2023-11-07
Attending: FAMILY MEDICINE

## 2023-11-07 VITALS
RESPIRATION RATE: 18 BRPM | DIASTOLIC BLOOD PRESSURE: 81 MMHG | OXYGEN SATURATION: 99 % | TEMPERATURE: 98.2 F | BODY MASS INDEX: 24.11 KG/M2 | HEIGHT: 66 IN | SYSTOLIC BLOOD PRESSURE: 129 MMHG | WEIGHT: 150 LBS | HEART RATE: 71 BPM

## 2023-11-07 DIAGNOSIS — H10.9 CONJUNCTIVITIS OF LEFT EYE, UNSPECIFIED CONJUNCTIVITIS TYPE: Primary | ICD-10-CM

## 2023-11-07 PROCEDURE — 99212 OFFICE O/P EST SF 10 MIN: CPT

## 2023-11-07 RX ORDER — OFLOXACIN 3 MG/ML
1-2 SOLUTION/ DROPS OPHTHALMIC 4 TIMES DAILY
Qty: 5 ML | Refills: 0 | Status: SHIPPED | OUTPATIENT
Start: 2023-11-07 | End: 2023-11-14

## 2023-12-31 ENCOUNTER — WALK IN (OUTPATIENT)
Dept: URGENT CARE | Age: 73
End: 2023-12-31
Attending: EMERGENCY MEDICINE

## 2023-12-31 VITALS
OXYGEN SATURATION: 99 % | SYSTOLIC BLOOD PRESSURE: 121 MMHG | DIASTOLIC BLOOD PRESSURE: 82 MMHG | RESPIRATION RATE: 18 BRPM | TEMPERATURE: 97.6 F | HEART RATE: 74 BPM

## 2023-12-31 DIAGNOSIS — J02.9 SORE THROAT: ICD-10-CM

## 2023-12-31 DIAGNOSIS — J06.9 VIRAL UPPER RESPIRATORY TRACT INFECTION WITH COUGH: ICD-10-CM

## 2023-12-31 DIAGNOSIS — R05.9 COUGH, UNSPECIFIED TYPE: Primary | ICD-10-CM

## 2023-12-31 LAB
FLUAV AG UPPER RESP QL IA.RAPID: NEGATIVE
FLUBV AG UPPER RESP QL IA.RAPID: NEGATIVE
INTERNAL PROCEDURAL CONTROLS ACCEPTABLE: YES
S PYO AG THROAT QL IA.RAPID: NEGATIVE
SARS-COV+SARS-COV-2 AG RESP QL IA.RAPID: NOT DETECTED
TEST LOT EXPIRATION DATE: NORMAL
TEST LOT EXPIRATION DATE: NORMAL
TEST LOT NUMBER: NORMAL
TEST LOT NUMBER: NORMAL

## 2023-12-31 PROCEDURE — 87880 STREP A ASSAY W/OPTIC: CPT | Performed by: EMERGENCY MEDICINE

## 2023-12-31 PROCEDURE — 87428 SARSCOV & INF VIR A&B AG IA: CPT | Performed by: EMERGENCY MEDICINE

## 2023-12-31 RX ORDER — BENZONATATE 200 MG/1
200 CAPSULE ORAL 3 TIMES DAILY PRN
Qty: 20 CAPSULE | Refills: 0 | Status: SHIPPED | OUTPATIENT
Start: 2023-12-31

## 2023-12-31 RX ORDER — BENZONATATE 200 MG/1
200 CAPSULE ORAL 3 TIMES DAILY PRN
Qty: 20 CAPSULE | Refills: 0 | Status: SHIPPED | OUTPATIENT
Start: 2023-12-31 | End: 2023-12-31 | Stop reason: SDUPTHER

## 2024-01-23 NOTE — TELEPHONE ENCOUNTER
She does not have to do a stool sample if she is not having any diarrhea or if stools are formed. Recommend to continue pushing plenty of fluids, return to clinic if her symptoms does not improve next week. advance diet  d/c ARLYN if Cr = serum Cr  likely home today  catheter removal 1/29.

## 2024-02-02 ENCOUNTER — TELEPHONE (OUTPATIENT)
Dept: INTERNAL MEDICINE | Age: 74
End: 2024-02-02

## 2024-02-02 ENCOUNTER — WALK IN (OUTPATIENT)
Dept: URGENT CARE | Age: 74
End: 2024-02-02

## 2024-02-02 VITALS
HEART RATE: 77 BPM | RESPIRATION RATE: 16 BRPM | DIASTOLIC BLOOD PRESSURE: 81 MMHG | TEMPERATURE: 97.3 F | SYSTOLIC BLOOD PRESSURE: 126 MMHG | OXYGEN SATURATION: 100 %

## 2024-02-02 DIAGNOSIS — U07.1 COVID-19 VIRUS INFECTION: ICD-10-CM

## 2024-02-02 DIAGNOSIS — Z20.822 EXPOSURE TO COVID-19 VIRUS: Primary | ICD-10-CM

## 2024-02-02 LAB
FLUAV AG UPPER RESP QL IA.RAPID: NEGATIVE
FLUBV AG UPPER RESP QL IA.RAPID: NEGATIVE
SARS-COV+SARS-COV-2 AG RESP QL IA.RAPID: DETECTED
TEST LOT EXPIRATION DATE: ABNORMAL
TEST LOT NUMBER: ABNORMAL

## 2024-02-02 RX ORDER — CODEINE PHOSPHATE/GUAIFENESIN 10-100MG/5
5 LIQUID (ML) ORAL 3 TIMES DAILY PRN
Qty: 120 ML | Refills: 0 | OUTPATIENT
Start: 2024-02-02

## 2024-02-02 RX ORDER — CODEINE PHOSPHATE/GUAIFENESIN 10-100MG/5
5 LIQUID (ML) ORAL 3 TIMES DAILY PRN
Qty: 120 ML | Refills: 0 | Status: SHIPPED | OUTPATIENT
Start: 2024-02-02 | End: 2024-02-02 | Stop reason: SDUPTHER

## 2024-02-02 ASSESSMENT — ENCOUNTER SYMPTOMS
POLYPHAGIA: 0
SHORTNESS OF BREATH: 0
ABDOMINAL PAIN: 0
VOMITING: 0
AGITATION: 0
EYE REDNESS: 0
ABDOMINAL DISTENTION: 0
DIARRHEA: 0
COLOR CHANGE: 0
FEVER: 0
EYE DISCHARGE: 0
SINUS PAIN: 0
SORE THROAT: 0
CHEST TIGHTNESS: 0
HALLUCINATIONS: 0
APPETITE CHANGE: 0
HEADACHES: 1
CHILLS: 0
NAUSEA: 0
DIZZINESS: 0
VOICE CHANGE: 0
COUGH: 1
BACK PAIN: 0
SPEECH DIFFICULTY: 0
RECTAL PAIN: 0
NUMBNESS: 0

## 2024-10-16 ENCOUNTER — HOSPITAL ENCOUNTER (OUTPATIENT)
Age: 74
Setting detail: OBSERVATION
Discharge: HOME | End: 2024-10-17
Attending: EMERGENCY MEDICINE | Admitting: INTERNAL MEDICINE

## 2024-10-16 ENCOUNTER — APPOINTMENT (OUTPATIENT)
Dept: CT IMAGING | Age: 74
End: 2024-10-16
Attending: EMERGENCY MEDICINE

## 2024-10-16 DIAGNOSIS — R19.7 DIARRHEA, UNSPECIFIED TYPE: ICD-10-CM

## 2024-10-16 DIAGNOSIS — E86.0 DEHYDRATION: ICD-10-CM

## 2024-10-16 DIAGNOSIS — R55 SYNCOPE, UNSPECIFIED SYNCOPE TYPE: Primary | ICD-10-CM

## 2024-10-16 LAB
ALBUMIN SERPL-MCNC: 3.7 G/DL (ref 3.4–5)
ALBUMIN/GLOB SERPL: 1.1 {RATIO} (ref 1–2.4)
ALP SERPL-CCNC: 62 UNITS/L (ref 45–117)
ALT SERPL-CCNC: 15 UNITS/L
ANION GAP SERPL CALC-SCNC: 13 MMOL/L (ref 7–19)
AST SERPL-CCNC: 18 UNITS/L
ATRIAL RATE (BPM): 82
BASOPHILS # BLD: 0 K/MCL (ref 0–0.3)
BASOPHILS NFR BLD: 0 %
BILIRUB SERPL-MCNC: 0.7 MG/DL (ref 0.2–1)
BUN SERPL-MCNC: 15 MG/DL (ref 6–20)
BUN/CREAT SERPL: 13 (ref 7–25)
C DIFF TOX B TCDB STL QL NAA+PROBE: NOT DETECTED
CALCIUM SERPL-MCNC: 8.4 MG/DL (ref 8.4–10.2)
CHLORIDE SERPL-SCNC: 100 MMOL/L (ref 97–110)
CO2 SERPL-SCNC: 25 MMOL/L (ref 21–32)
CREAT SERPL-MCNC: 1.15 MG/DL (ref 0.51–0.95)
DEPRECATED RDW RBC: 45.4 FL (ref 39–50)
EGFRCR SERPLBLD CKD-EPI 2021: 50 ML/MIN/{1.73_M2}
EOSINOPHIL # BLD: 0 K/MCL (ref 0–0.5)
EOSINOPHIL NFR BLD: 0 %
ERYTHROCYTE [DISTWIDTH] IN BLOOD: 12.8 % (ref 11–15)
FASTING DURATION TIME PATIENT: ABNORMAL H
GLOBULIN SER-MCNC: 3.4 G/DL (ref 2–4)
GLUCOSE SERPL-MCNC: 107 MG/DL (ref 70–99)
HCT VFR BLD CALC: 36.4 % (ref 36–46.5)
HGB BLD-MCNC: 11.7 G/DL (ref 12–15.5)
IMM GRANULOCYTES # BLD AUTO: 0 K/MCL (ref 0–0.2)
IMM GRANULOCYTES # BLD: 0 %
LACTATE BLDV-SCNC: 1 MMOL/L (ref 0–2)
LYMPHOCYTES # BLD: 0.8 K/MCL (ref 1–4)
LYMPHOCYTES NFR BLD: 14 %
MCH RBC QN AUTO: 30.8 PG (ref 26–34)
MCHC RBC AUTO-ENTMCNC: 32.1 G/DL (ref 32–36.5)
MCV RBC AUTO: 95.8 FL (ref 78–100)
MONOCYTES # BLD: 0.4 K/MCL (ref 0.3–0.9)
MONOCYTES NFR BLD: 6 %
NEUTROPHILS # BLD: 4.7 K/MCL (ref 1.8–7.7)
NEUTROPHILS NFR BLD: 80 %
NRBC BLD MANUAL-RTO: 0 /100 WBC
P AXIS (DEGREES): 81
PLATELET # BLD AUTO: 278 K/MCL (ref 140–450)
POTASSIUM SERPL-SCNC: 3.9 MMOL/L (ref 3.4–5.1)
PR-INTERVAL (MSEC): 166
PROT SERPL-MCNC: 7.1 G/DL (ref 6.4–8.2)
QRS-INTERVAL (MSEC): 70
QT-INTERVAL (MSEC): 378
QTC: 441
R AXIS (DEGREES): 77
RBC # BLD: 3.8 MIL/MCL (ref 4–5.2)
REPORT TEXT: NORMAL
SODIUM SERPL-SCNC: 134 MMOL/L (ref 135–145)
T AXIS (DEGREES): 73
VENTRICULAR RATE EKG/MIN (BPM): 82
WBC # BLD: 6 K/MCL (ref 4.2–11)

## 2024-10-16 PROCEDURE — 80053 COMPREHEN METABOLIC PANEL: CPT | Performed by: EMERGENCY MEDICINE

## 2024-10-16 PROCEDURE — 10002800 HB RX 250 W HCPCS: Performed by: EMERGENCY MEDICINE

## 2024-10-16 PROCEDURE — 96374 THER/PROPH/DIAG INJ IV PUSH: CPT

## 2024-10-16 PROCEDURE — 10002800 HB RX 250 W HCPCS: Performed by: INTERNAL MEDICINE

## 2024-10-16 PROCEDURE — 87040 BLOOD CULTURE FOR BACTERIA: CPT | Performed by: EMERGENCY MEDICINE

## 2024-10-16 PROCEDURE — 87493 C DIFF AMPLIFIED PROBE: CPT | Performed by: EMERGENCY MEDICINE

## 2024-10-16 PROCEDURE — 93010 ELECTROCARDIOGRAM REPORT: CPT | Performed by: INTERNAL MEDICINE

## 2024-10-16 PROCEDURE — G0378 HOSPITAL OBSERVATION PER HR: HCPCS

## 2024-10-16 PROCEDURE — 85025 COMPLETE CBC W/AUTO DIFF WBC: CPT | Performed by: EMERGENCY MEDICINE

## 2024-10-16 PROCEDURE — 74176 CT ABD & PELVIS W/O CONTRAST: CPT

## 2024-10-16 PROCEDURE — 10004651 HB RX, NO CHARGE ITEM: Performed by: INTERNAL MEDICINE

## 2024-10-16 PROCEDURE — 36415 COLL VENOUS BLD VENIPUNCTURE: CPT

## 2024-10-16 PROCEDURE — 83605 ASSAY OF LACTIC ACID: CPT | Performed by: EMERGENCY MEDICINE

## 2024-10-16 PROCEDURE — 10002803 HB RX 637: Performed by: INTERNAL MEDICINE

## 2024-10-16 PROCEDURE — 96372 THER/PROPH/DIAG INJ SC/IM: CPT | Performed by: INTERNAL MEDICINE

## 2024-10-16 PROCEDURE — 93005 ELECTROCARDIOGRAM TRACING: CPT | Performed by: EMERGENCY MEDICINE

## 2024-10-16 PROCEDURE — 99285 EMERGENCY DEPT VISIT HI MDM: CPT

## 2024-10-16 PROCEDURE — 10002807 HB RX 258: Performed by: EMERGENCY MEDICINE

## 2024-10-16 PROCEDURE — 87507 IADNA-DNA/RNA PROBE TQ 12-25: CPT | Performed by: NURSE PRACTITIONER

## 2024-10-16 PROCEDURE — 96361 HYDRATE IV INFUSION ADD-ON: CPT

## 2024-10-16 PROCEDURE — 87209 SMEAR COMPLEX STAIN: CPT | Performed by: EMERGENCY MEDICINE

## 2024-10-16 RX ORDER — TRAZODONE HYDROCHLORIDE 50 MG/1
50 TABLET, FILM COATED ORAL AT BEDTIME
Status: DISCONTINUED | OUTPATIENT
Start: 2024-10-16 | End: 2024-10-17 | Stop reason: HOSPADM

## 2024-10-16 RX ORDER — LANOLIN ALCOHOL/MO/W.PET/CERES
3 CREAM (GRAM) TOPICAL NIGHTLY PRN
Status: DISCONTINUED | OUTPATIENT
Start: 2024-10-16 | End: 2024-10-17 | Stop reason: HOSPADM

## 2024-10-16 RX ORDER — 0.9 % SODIUM CHLORIDE 0.9 %
10 VIAL (ML) INJECTION PRN
Status: DISCONTINUED | OUTPATIENT
Start: 2024-10-16 | End: 2024-10-17 | Stop reason: HOSPADM

## 2024-10-16 RX ORDER — ONDANSETRON 2 MG/ML
4 INJECTION INTRAMUSCULAR; INTRAVENOUS EVERY 12 HOURS PRN
Status: DISCONTINUED | OUTPATIENT
Start: 2024-10-16 | End: 2024-10-17 | Stop reason: HOSPADM

## 2024-10-16 RX ORDER — 0.9 % SODIUM CHLORIDE 0.9 %
2 VIAL (ML) INJECTION EVERY 12 HOURS SCHEDULED
Status: DISCONTINUED | OUTPATIENT
Start: 2024-10-16 | End: 2024-10-17 | Stop reason: HOSPADM

## 2024-10-16 RX ORDER — ACETAMINOPHEN 325 MG/1
650 TABLET ORAL EVERY 4 HOURS PRN
Status: DISCONTINUED | OUTPATIENT
Start: 2024-10-16 | End: 2024-10-16

## 2024-10-16 RX ORDER — SODIUM CHLORIDE 9 MG/ML
INJECTION, SOLUTION INTRAVENOUS CONTINUOUS
Status: DISCONTINUED | OUTPATIENT
Start: 2024-10-16 | End: 2024-10-16

## 2024-10-16 RX ORDER — ONDANSETRON 4 MG/1
4 TABLET, ORALLY DISINTEGRATING ORAL EVERY 6 HOURS PRN
Status: DISCONTINUED | OUTPATIENT
Start: 2024-10-16 | End: 2024-10-17 | Stop reason: HOSPADM

## 2024-10-16 RX ORDER — ONDANSETRON 2 MG/ML
4 INJECTION INTRAMUSCULAR; INTRAVENOUS ONCE
Status: COMPLETED | OUTPATIENT
Start: 2024-10-16 | End: 2024-10-16

## 2024-10-16 RX ORDER — ENOXAPARIN SODIUM 100 MG/ML
40 INJECTION SUBCUTANEOUS AT BEDTIME
Status: DISCONTINUED | OUTPATIENT
Start: 2024-10-16 | End: 2024-10-17 | Stop reason: HOSPADM

## 2024-10-16 RX ORDER — ACETAMINOPHEN 650 MG/1
650 SUPPOSITORY RECTAL EVERY 4 HOURS PRN
Status: DISCONTINUED | OUTPATIENT
Start: 2024-10-16 | End: 2024-10-17 | Stop reason: HOSPADM

## 2024-10-16 RX ORDER — CITALOPRAM HYDROBROMIDE 10 MG/1
20 TABLET ORAL DAILY
Status: DISCONTINUED | OUTPATIENT
Start: 2024-10-16 | End: 2024-10-17 | Stop reason: HOSPADM

## 2024-10-16 RX ADMIN — CITALOPRAM HYDROBROMIDE 20 MG: 10 TABLET ORAL at 16:44

## 2024-10-16 RX ADMIN — ONDANSETRON 4 MG: 2 INJECTION INTRAMUSCULAR; INTRAVENOUS at 09:16

## 2024-10-16 RX ADMIN — ENOXAPARIN SODIUM 40 MG: 100 INJECTION SUBCUTANEOUS at 20:51

## 2024-10-16 RX ADMIN — TRAZODONE HYDROCHLORIDE 50 MG: 50 TABLET ORAL at 20:51

## 2024-10-16 RX ADMIN — SODIUM CHLORIDE 1000 ML: 9 INJECTION, SOLUTION INTRAVENOUS at 09:16

## 2024-10-16 RX ADMIN — SODIUM CHLORIDE, PRESERVATIVE FREE 2 ML: 5 INJECTION INTRAVENOUS at 20:52

## 2024-10-16 SDOH — ECONOMIC STABILITY: HOUSING INSECURITY: DO YOU HAVE PROBLEMS WITH ANY OF THE FOLLOWING?: NONE OF THE ABOVE

## 2024-10-16 SDOH — ECONOMIC STABILITY: TRANSPORTATION INSECURITY
IN THE PAST 12 MONTHS, HAS LACK OF RELIABLE TRANSPORTATION KEPT YOU FROM MEDICAL APPOINTMENTS, MEETINGS, WORK OR FROM GETTING THINGS NEEDED FOR DAILY LIVING?: NO

## 2024-10-16 SDOH — SOCIAL STABILITY: SOCIAL INSECURITY: HOW OFTEN DOES ANYONE, INCLUDING FAMILY AND FRIENDS, INSULT OR TALK DOWN TO YOU?: NEVER

## 2024-10-16 SDOH — ECONOMIC STABILITY: FOOD INSECURITY: WITHIN THE PAST 12 MONTHS, THE FOOD YOU BOUGHT JUST DIDN'T LAST AND YOU DIDN'T HAVE MONEY TO GET MORE.: NEVER TRUE

## 2024-10-16 SDOH — SOCIAL STABILITY: SOCIAL NETWORK
HOW OFTEN DO YOU SEE OR TALK TO PEOPLE THAT YOU CARE ABOUT AND FEEL CLOSE TO? (FOR EXAMPLE: TALKING TO FRIENDS ON THE PHONE, VISITING FRIENDS OR FAMILY, GOING TO CHURCH OR CLUB MEETINGS): 5 OR MORE TIMES A WEEK

## 2024-10-16 SDOH — SOCIAL STABILITY: SOCIAL INSECURITY: HOW OFTEN DOES ANYONE, INCLUDING FAMILY AND FRIENDS, THREATEN YOU WITH HARM?: NEVER

## 2024-10-16 SDOH — ECONOMIC STABILITY: HOUSING INSECURITY: WHAT IS YOUR LIVING SITUATION TODAY?: I HAVE A STEADY PLACE TO LIVE

## 2024-10-16 SDOH — ECONOMIC STABILITY: INCOME INSECURITY: IN THE PAST 12 MONTHS, HAS THE ELECTRIC, GAS, OIL, OR WATER COMPANY THREATENED TO SHUT OFF SERVICE IN YOUR HOME?: NO

## 2024-10-16 SDOH — SOCIAL STABILITY: SOCIAL INSECURITY: HOW OFTEN DOES ANYONE, INCLUDING FAMILY AND FRIENDS, PHYSICALLY HURT YOU?: NEVER

## 2024-10-16 SDOH — ECONOMIC STABILITY: GENERAL

## 2024-10-16 SDOH — SOCIAL STABILITY: SOCIAL INSECURITY: HOW OFTEN DOES ANYONE, INCLUDING FAMILY AND FRIENDS, SCREAM OR CURSE AT YOU?: NEVER

## 2024-10-16 SDOH — ECONOMIC STABILITY: GENERAL: WOULD YOU LIKE HELP WITH ANY OF THE FOLLOWING NEEDS?: I DON'T WANT HELP WITH ANY OF THESE

## 2024-10-16 SDOH — HEALTH STABILITY: GENERAL: BECAUSE OF A PHYSICAL, MENTAL, OR EMOTIONAL CONDITION, DO YOU HAVE DIFFICULTY DOING ERRANDS ALONE?: NO

## 2024-10-16 SDOH — ECONOMIC STABILITY: HOUSING INSECURITY: WHAT IS YOUR LIVING SITUATION TODAY?: HOUSE

## 2024-10-16 SDOH — ECONOMIC STABILITY: HOUSING INSECURITY: WHAT IS YOUR LIVING SITUATION TODAY?: SIGNIFICANT OTHER

## 2024-10-16 SDOH — HEALTH STABILITY: PHYSICAL HEALTH: DO YOU HAVE DIFFICULTY DRESSING OR BATHING?: NO

## 2024-10-16 SDOH — SOCIAL STABILITY: SOCIAL NETWORK: SUPPORT SYSTEMS: FAMILY MEMBERS;FRIENDS;CHILDREN

## 2024-10-16 SDOH — HEALTH STABILITY: GENERAL
BECAUSE OF A PHYSICAL, MENTAL, OR EMOTIONAL CONDITION, DO YOU HAVE SERIOUS DIFFICULTY CONCENTRATING, REMEMBERING OR MAKING DECISIONS?: NO

## 2024-10-16 SDOH — HEALTH STABILITY: PHYSICAL HEALTH: DO YOU HAVE SERIOUS DIFFICULTY WALKING OR CLIMBING STAIRS?: NO

## 2024-10-16 ASSESSMENT — LIFESTYLE VARIABLES
AUDIT-C TOTAL SCORE: 4
HOW OFTEN DO YOU HAVE 6 OR MORE DRINKS ON ONE OCCASION: NEVER
ALCOHOL_USE_STATUS: NO OR LOW RISK WITH VALIDATED TOOL
HOW MANY STANDARD DRINKS CONTAINING ALCOHOL DO YOU HAVE ON A TYPICAL DAY: 0,1 OR 2
HOW OFTEN DO YOU HAVE A DRINK CONTAINING ALCOHOL: 4 OR MORE TIMES PER WEEK

## 2024-10-16 ASSESSMENT — ORIENTATION MEMORY CONCENTRATION TEST (OMCT)
OMCT SCORE: 0
REPEAT THE NAME AND ADDRESS I ASKED YOU TO REMEMBER: CORRECT
SAY THE MONTHS IN REVERSE ORDER STARTING WITH LAST MONTH: CORRECT
COUNT BACKWARDS FROM 20 TO 1: CORRECT
WHAT TIME IS IT (NO WATCH OR CLOCK): CORRECT
OMCT INTERPRETATION: 0-6: NO SIGNIFICANT IMPAIRMENT
WHAT YEAR IS IT NOW (MUST BE EXACT): CORRECT
WHAT MONTH IS IT NOW: CORRECT

## 2024-10-16 ASSESSMENT — ACTIVITIES OF DAILY LIVING (ADL)
RECENT_DECLINE_ADL: NO
ADL_SCORE: 12
ADL_BEFORE_ADMISSION: INDEPENDENT
ADL_SHORT_OF_BREATH: YES

## 2024-10-16 ASSESSMENT — PATIENT HEALTH QUESTIONNAIRE - PHQ9
CLINICAL INTERPRETATION OF PHQ2 SCORE: NO FURTHER SCREENING NEEDED
IS PATIENT ABLE TO COMPLETE PHQ2 OR PHQ9: YES
SUM OF ALL RESPONSES TO PHQ9 QUESTIONS 1 AND 2: 0

## 2024-10-16 ASSESSMENT — PAIN SCALES - GENERAL
PAINLEVEL_OUTOF10: 0
PAINLEVEL_OUTOF10: 1
PAINLEVEL_OUTOF10: 4

## 2024-10-17 VITALS
HEIGHT: 66 IN | TEMPERATURE: 98.4 F | RESPIRATION RATE: 18 BRPM | HEART RATE: 65 BPM | OXYGEN SATURATION: 97 % | BODY MASS INDEX: 23.14 KG/M2 | DIASTOLIC BLOOD PRESSURE: 70 MMHG | WEIGHT: 143.96 LBS | SYSTOLIC BLOOD PRESSURE: 117 MMHG

## 2024-10-17 LAB
ADV 40+41 FIB PROT STL QL NAA+PROBE: NOT DETECTED
ALBUMIN SERPL-MCNC: 2.9 G/DL (ref 3.4–5)
ALBUMIN/GLOB SERPL: 1 {RATIO} (ref 1–2.4)
ALP SERPL-CCNC: 50 UNITS/L (ref 45–117)
ALT SERPL-CCNC: 13 UNITS/L
ANION GAP SERPL CALC-SCNC: 10 MMOL/L (ref 7–19)
AST SERPL-CCNC: 17 UNITS/L
ASTRO TYP 1-8 RNA STL QL NAA+NON-PROBE: NOT DETECTED
BASOPHILS # BLD: 0 K/MCL (ref 0–0.3)
BASOPHILS NFR BLD: 0 %
BILIRUB SERPL-MCNC: 0.4 MG/DL (ref 0.2–1)
BUN SERPL-MCNC: 10 MG/DL (ref 6–20)
BUN/CREAT SERPL: 10 (ref 7–25)
C CAYETANENSIS DNA STL QL NAA+NON-PROBE: NOT DETECTED
C COLI+JEJ+LAR 16S RRNA STL QL NAA+PROBE: NOT DETECTED
C PARVUM+HOMINIS COWP STL QL NAA+PROBE: NOT DETECTED
CALCIUM SERPL-MCNC: 8.2 MG/DL (ref 8.4–10.2)
CHLORIDE SERPL-SCNC: 106 MMOL/L (ref 97–110)
CO2 SERPL-SCNC: 26 MMOL/L (ref 21–32)
CREAT SERPL-MCNC: 1 MG/DL (ref 0.51–0.95)
DEPRECATED RDW RBC: 45.6 FL (ref 39–50)
E HISTOLYTICA 18S RRNA STL QL NAA+PROBE: NOT DETECTED
EAEC PAA PLAS AGGR+AATA ST NAA+NON-PRB: NOT DETECTED
EC STX1+STX2 GENES STL QL NAA+NON-PROBE: NOT DETECTED
EGFRCR SERPLBLD CKD-EPI 2021: 59 ML/MIN/{1.73_M2}
EOSINOPHIL # BLD: 0.2 K/MCL (ref 0–0.5)
EOSINOPHIL NFR BLD: 3 %
EPEC EAE GENE STL QL NAA+NON-PROBE: NOT DETECTED
ERYTHROCYTE [DISTWIDTH] IN BLOOD: 12.8 % (ref 11–15)
ETEC ELTA+ESTB GENES STL QL NAA+PROBE: NOT DETECTED
FASTING DURATION TIME PATIENT: ABNORMAL H
G LAMBLIA 18S RRNA STL QL NAA+PROBE: NOT DETECTED
GLOBULIN SER-MCNC: 3 G/DL (ref 2–4)
GLUCOSE SERPL-MCNC: 82 MG/DL (ref 70–99)
HCT VFR BLD CALC: 32.6 % (ref 36–46.5)
HGB BLD-MCNC: 10.4 G/DL (ref 12–15.5)
IMM GRANULOCYTES # BLD AUTO: 0 K/MCL (ref 0–0.2)
IMM GRANULOCYTES # BLD: 0 %
INR PPP: 1
LYMPHOCYTES # BLD: 1.8 K/MCL (ref 1–4)
LYMPHOCYTES NFR BLD: 40 %
MAGNESIUM SERPL-MCNC: 2 MG/DL (ref 1.7–2.4)
MCH RBC QN AUTO: 31 PG (ref 26–34)
MCHC RBC AUTO-ENTMCNC: 31.9 G/DL (ref 32–36.5)
MCV RBC AUTO: 97 FL (ref 78–100)
MONOCYTES # BLD: 0.5 K/MCL (ref 0.3–0.9)
MONOCYTES NFR BLD: 11 %
NEUTROPHILS # BLD: 2.1 K/MCL (ref 1.8–7.7)
NEUTROPHILS NFR BLD: 46 %
NOROVIRUS GI+II RNA STL QL NAA+NON-PROBE: DETECTED
NRBC BLD MANUAL-RTO: 0 /100 WBC
P SHIGELLOIDES DNA STL QL NAA+NON-PROBE: NOT DETECTED
PLATELET # BLD AUTO: 236 K/MCL (ref 140–450)
POTASSIUM SERPL-SCNC: 4.1 MMOL/L (ref 3.4–5.1)
PROT SERPL-MCNC: 5.9 G/DL (ref 6.4–8.2)
PROTHROMBIN TIME: 10.8 SEC (ref 9.7–11.8)
RBC # BLD: 3.36 MIL/MCL (ref 4–5.2)
RVA VP6 STL QL NAA+PROBE: NOT DETECTED
SALMONELLA SP INVA+FLIC STL QL NAA+PROBE: NOT DETECTED
SAPO I+II+IV+V RNA STL QL NAA+NON-PROBE: NOT DETECTED
SHIGELLA SP+EIEC IPAH ST NAA+NON-PROBE: NOT DETECTED
SODIUM SERPL-SCNC: 138 MMOL/L (ref 135–145)
TSH SERPL-ACNC: 3.07 MCUNITS/ML (ref 0.35–5)
V CHOL+PARA+VUL DNA STL QL NAA+NON-PROBE: NOT DETECTED
VIBRIO CHOL TOXIN CTXA STL QL NAA+PROBE: NOT DETECTED
WBC # BLD: 4.5 K/MCL (ref 4.2–11)
Y ENTEROCOL DNA STL QL NAA+NON-PROBE: NOT DETECTED

## 2024-10-17 PROCEDURE — 84443 ASSAY THYROID STIM HORMONE: CPT | Performed by: INTERNAL MEDICINE

## 2024-10-17 PROCEDURE — 10002803 HB RX 637: Performed by: INTERNAL MEDICINE

## 2024-10-17 PROCEDURE — 80053 COMPREHEN METABOLIC PANEL: CPT | Performed by: INTERNAL MEDICINE

## 2024-10-17 PROCEDURE — 83735 ASSAY OF MAGNESIUM: CPT | Performed by: INTERNAL MEDICINE

## 2024-10-17 PROCEDURE — 85025 COMPLETE CBC W/AUTO DIFF WBC: CPT | Performed by: INTERNAL MEDICINE

## 2024-10-17 PROCEDURE — 36415 COLL VENOUS BLD VENIPUNCTURE: CPT | Performed by: INTERNAL MEDICINE

## 2024-10-17 PROCEDURE — 10004651 HB RX, NO CHARGE ITEM: Performed by: INTERNAL MEDICINE

## 2024-10-17 PROCEDURE — G0378 HOSPITAL OBSERVATION PER HR: HCPCS

## 2024-10-17 PROCEDURE — 85610 PROTHROMBIN TIME: CPT | Performed by: INTERNAL MEDICINE

## 2024-10-17 RX ADMIN — CITALOPRAM HYDROBROMIDE 20 MG: 10 TABLET ORAL at 08:24

## 2024-10-17 RX ADMIN — SODIUM CHLORIDE, PRESERVATIVE FREE 2 ML: 5 INJECTION INTRAVENOUS at 08:25

## 2024-10-19 ENCOUNTER — TELEPHONE (OUTPATIENT)
Dept: CARE COORDINATION | Age: 74
End: 2024-10-19

## 2024-10-19 ENCOUNTER — CASE MANAGEMENT (OUTPATIENT)
Dept: CARE COORDINATION | Age: 74
End: 2024-10-19

## 2024-10-19 LAB
BACTERIA BLD CULT: NORMAL
BACTERIA BLD CULT: NORMAL

## 2024-10-21 LAB
BACTERIA BLD CULT: NORMAL
BACTERIA BLD CULT: NORMAL

## 2024-10-22 LAB — O+P STL MICRO: NEGATIVE

## 2024-10-26 ENCOUNTER — TELEPHONE (OUTPATIENT)
Dept: CARE COORDINATION | Age: 74
End: 2024-10-26

## 2024-11-02 ENCOUNTER — TELEPHONE (OUTPATIENT)
Dept: CARE COORDINATION | Age: 74
End: 2024-11-02

## 2024-11-21 ENCOUNTER — APPOINTMENT (OUTPATIENT)
Dept: CT IMAGING | Age: 74
End: 2024-11-21

## 2024-11-21 ENCOUNTER — HOSPITAL ENCOUNTER (EMERGENCY)
Age: 74
Discharge: HOME | End: 2024-11-21
Attending: EMERGENCY MEDICINE

## 2024-11-21 VITALS
HEART RATE: 66 BPM | BODY MASS INDEX: 24.36 KG/M2 | SYSTOLIC BLOOD PRESSURE: 150 MMHG | WEIGHT: 151.57 LBS | RESPIRATION RATE: 17 BRPM | DIASTOLIC BLOOD PRESSURE: 76 MMHG | TEMPERATURE: 97.6 F | OXYGEN SATURATION: 97 % | HEIGHT: 66 IN

## 2024-11-21 DIAGNOSIS — L03.213 PRESEPTAL CELLULITIS: Primary | ICD-10-CM

## 2024-11-21 LAB
ALBUMIN SERPL-MCNC: 3.6 G/DL (ref 3.4–5)
ALBUMIN/GLOB SERPL: 1 {RATIO} (ref 1–2.4)
ALP SERPL-CCNC: 77 UNITS/L (ref 45–117)
ALT SERPL-CCNC: 20 UNITS/L
ANION GAP SERPL CALC-SCNC: 11 MMOL/L (ref 7–19)
AST SERPL-CCNC: 23 UNITS/L
BASOPHILS # BLD: 0 K/MCL (ref 0–0.3)
BASOPHILS NFR BLD: 0 %
BILIRUB SERPL-MCNC: 0.4 MG/DL (ref 0.2–1)
BUN SERPL-MCNC: 15 MG/DL (ref 6–20)
BUN/CREAT SERPL: 16 (ref 7–25)
CALCIUM SERPL-MCNC: 8.7 MG/DL (ref 8.4–10.2)
CHLORIDE SERPL-SCNC: 104 MMOL/L (ref 97–110)
CO2 SERPL-SCNC: 30 MMOL/L (ref 21–32)
CREAT SERPL-MCNC: 0.94 MG/DL (ref 0.51–0.95)
DEPRECATED RDW RBC: 44.5 FL (ref 39–50)
EGFRCR SERPLBLD CKD-EPI 2021: 64 ML/MIN/{1.73_M2}
EOSINOPHIL # BLD: 0.1 K/MCL (ref 0–0.5)
EOSINOPHIL NFR BLD: 1 %
ERYTHROCYTE [DISTWIDTH] IN BLOOD: 12.8 % (ref 11–15)
FASTING DURATION TIME PATIENT: NORMAL H
GLOBULIN SER-MCNC: 3.7 G/DL (ref 2–4)
GLUCOSE SERPL-MCNC: 94 MG/DL (ref 70–99)
HCT VFR BLD CALC: 35.5 % (ref 36–46.5)
HGB BLD-MCNC: 11.6 G/DL (ref 12–15.5)
IMM GRANULOCYTES # BLD AUTO: 0 K/MCL (ref 0–0.2)
IMM GRANULOCYTES # BLD: 0 %
LYMPHOCYTES # BLD: 2 K/MCL (ref 1–4)
LYMPHOCYTES NFR BLD: 29 %
MCH RBC QN AUTO: 30.8 PG (ref 26–34)
MCHC RBC AUTO-ENTMCNC: 32.7 G/DL (ref 32–36.5)
MCV RBC AUTO: 94.2 FL (ref 78–100)
MONOCYTES # BLD: 0.5 K/MCL (ref 0.3–0.9)
MONOCYTES NFR BLD: 7 %
NEUTROPHILS # BLD: 4.4 K/MCL (ref 1.8–7.7)
NEUTROPHILS NFR BLD: 63 %
NRBC BLD MANUAL-RTO: 0 /100 WBC
PLATELET # BLD AUTO: 301 K/MCL (ref 140–450)
POTASSIUM SERPL-SCNC: 4.1 MMOL/L (ref 3.4–5.1)
PROT SERPL-MCNC: 7.3 G/DL (ref 6.4–8.2)
RAINBOW EXTRA TUBES HOLD SPECIMEN: NORMAL
RAINBOW EXTRA TUBES HOLD SPECIMEN: NORMAL
RBC # BLD: 3.77 MIL/MCL (ref 4–5.2)
SODIUM SERPL-SCNC: 141 MMOL/L (ref 135–145)
WBC # BLD: 7 K/MCL (ref 4.2–11)

## 2024-11-21 PROCEDURE — 10002801 HB RX 250 W/O HCPCS: Performed by: EMERGENCY MEDICINE

## 2024-11-21 PROCEDURE — 70481 CT ORBIT/EAR/FOSSA W/DYE: CPT

## 2024-11-21 PROCEDURE — 99284 EMERGENCY DEPT VISIT MOD MDM: CPT

## 2024-11-21 PROCEDURE — 85025 COMPLETE CBC W/AUTO DIFF WBC: CPT

## 2024-11-21 PROCEDURE — 10002805 HB CONTRAST AGENT

## 2024-11-21 PROCEDURE — 80053 COMPREHEN METABOLIC PANEL: CPT

## 2024-11-21 PROCEDURE — 10002801 HB RX 250 W/O HCPCS

## 2024-11-21 PROCEDURE — 10002800 HB RX 250 W HCPCS: Performed by: EMERGENCY MEDICINE

## 2024-11-21 PROCEDURE — 96374 THER/PROPH/DIAG INJ IV PUSH: CPT

## 2024-11-21 RX ORDER — OXYCODONE AND ACETAMINOPHEN 5; 325 MG/1; MG/1
1 TABLET ORAL EVERY 6 HOURS PRN
Qty: 8 TABLET | Refills: 0 | Status: SHIPPED | OUTPATIENT
Start: 2024-11-21

## 2024-11-21 RX ORDER — TETRACAINE HYDROCHLORIDE 5 MG/ML
SOLUTION OPHTHALMIC
Status: DISCONTINUED
Start: 2024-11-21 | End: 2024-11-21 | Stop reason: HOSPADM

## 2024-11-21 RX ORDER — ONDANSETRON 4 MG/1
4 TABLET, ORALLY DISINTEGRATING ORAL EVERY 6 HOURS
Qty: 10 TABLET | Refills: 0 | Status: SHIPPED | OUTPATIENT
Start: 2024-11-21

## 2024-11-21 RX ORDER — TETRACAINE HYDROCHLORIDE 5 MG/ML
2 SOLUTION OPHTHALMIC ONCE
Status: COMPLETED | OUTPATIENT
Start: 2024-11-21 | End: 2024-11-21

## 2024-11-21 RX ADMIN — CEFTRIAXONE 2000 MG: 2 INJECTION, POWDER, FOR SOLUTION INTRAMUSCULAR; INTRAVENOUS at 14:14

## 2024-11-21 RX ADMIN — IOHEXOL 75 ML: 350 INJECTION, SOLUTION INTRAVENOUS at 12:25

## 2024-11-21 RX ADMIN — TETRACAINE HYDROCHLORIDE 2 DROP: 5 SOLUTION OPHTHALMIC at 10:55

## 2024-11-21 ASSESSMENT — PAIN SCALES - GENERAL: PAINLEVEL_OUTOF10: 2

## 2025-03-22 NOTE — TELEPHONE ENCOUNTER
----- Message from MARIANO Gaspar sent at 6/23/2017  1:01 PM CDT -----  Please notify patient that her Pap smear is within normal limits. Recommend annual physical, will discuss need for Pap at physical next year. Thank you.
Patient informed of the below results and recommendations.  Tamika Patricia, 06/23/2017, 1:28 PM
No

## (undated) NOTE — MR AVS SNAPSHOT
[address] [place]  [place] [address] [id number] [phone]               Thank you for choosing us for your health care visit with MARIANO Mcgarry.   We are glad to serve you and happy to provide you with this summary o Discussed the causes of vaginitis, including bubble baths, douche, intercourse and oral sex, scented soaps, thong underwear, or anything else that can disrupt the normal vaginal griselda such as an antibiotic.   Eat more yogurt with active cultures and/or ta

## (undated) NOTE — MR AVS SNAPSHOT
[address] [place]  [facility] [id number]  [phone]               Thank you for choosing us for your health care visit with MARIANO Alfredo.   We are glad to serve you and happy to provide you with this summary o Allergies as of May 13, 2017     Penicillins                 Today's Vital Signs     BP Pulse Temp Height Weight BMI    112/76 mmHg 84 98.4 °F (36.9 °C) (Tympanic) 66\" 143 lb 9.6 oz 23.19 kg/m2         Current Medications          This list is accurate as

## (undated) NOTE — MR AVS SNAPSHOT
[address] [place]  [place] [address] [id number]  [phone]               Thank you for choosing us for your health care visit with Ayde Jaimes MD.  We are glad to serve you and happy to provide you with this summary of What changed:  Another medication with the same name was removed. Continue taking this medication, and follow the directions you see here. Commonly known as:  CeleXA           OSPHENA 60 MG Tabs   Generic drug:  Ospemifene   Take by mouth daily.

## (undated) NOTE — MR AVS SNAPSHOT
[address] [place]  [place] [address] [id number]  [phone]               Thank you for choosing us for your health care visit with MARIANO Redding.   We are glad to serve you and happy to provide you with this summary of yo · An over-the-counter anesthetic gargle  Use medicine for more relief  Over-the-counter medicine can reduce sore throat symptoms. Ask your pharmacist if you have questions about which medicine to use:  · Ease pain with anesthetic sprays.  Aspirin or an aspi Generic drug:  Zolpidem Tartrate   Take 5 mg by mouth daily. azithromycin 250 MG Tabs   Take two tablets by mouth today, then one daily.    Commonly known as:  ZITHROMAX           CALCIUM 500 +D 500-400 MG-UNIT Tabs   Generic drug:  Calcium Carb-C Women and lighter weight persons: limit to <= 1 drink* per day.               Visit [facility] online at  [email or web address]

## (undated) NOTE — MR AVS SNAPSHOT
[facility], [place] [id number] [facility], [address] [place] [id number] [phone]               Thank you for choosing us for your health care visit with Bryan Laureano DO.   We are glad to serve you and happy to provide you with [facility] authorize routine medications on weekends. ? No narcotics or controlled substances are refilled after noon on Fridays or by on call physicians. ? By law, narcotics cannot be faxed or phoned into your pharmacy.  The prescription must be signed by the provi the procedure/test has been pre-certified. You are strongly encouraged to contact your insurance carrier to verify that your procedure/test has been approved and is a COVERED benefit.   Although the [facility] staff does its due diligence, the insurance carrier g Korey                  Visit [facility] online at  [email or web address]

## (undated) NOTE — MR AVS SNAPSHOT
[address] [place]  [place] [phone]  [phone]               Thank you for choosing us for your health care visit with MARIANO Palmer.   We are glad to serve you and happy to provide you with this summary o CALCIUM 500 +D 500-400 MG-UNIT Tabs   Generic drug:  Calcium Carb-Cholecalciferol   Take 500 mg by mouth 2 (two) times daily. Citalopram Hydrobromide 20 MG Tabs   Take 1 tablet (20 mg total) by mouth daily.    Commonly known as:  [place] [place] Control Line Present with a clear background (yes/no) yes Yes/No    Kit Lot # [id number] Numeric    Kit Expiration Date 2018-10-31 Date                  MyChart               Educational Information     Your blood pressure indicates you may be at-risk for

## (undated) NOTE — MR AVS SNAPSHOT
[address] [place]  [place] [address] [phone]  [phone]               Thank you for choosing us for your health care visit with Isauro Brito MD.  We are glad to serve you and happy to provide you with this summary of Assoc Dx:   Headache, unspecified headache type [R51]                 Referral Details     Referred By    Referred To    [address] [place] [id number]   Phone:  [phone]   Fax:  [phone]    Diagnoses:  Pati Henry physician's   office. At that time, you will be provided with any authorization numbers or be assured that none are required. You can then schedule your appointment.  Failure to obtain required authorization numbers can create reimbursement difficulties for

## (undated) NOTE — MR AVS SNAPSHOT
[address] [place]  [place] [phone]  [phone]               Thank you for choosing us for your health care visit with Wale Torres MD.  We are glad to serve you and happy to provide you with this summary of Take 20 mg by mouth daily. Ciprofloxacin HCl 500 MG Tabs   Take 1 tablet (500 mg total) by mouth 2 (two) times daily.    Commonly known as:  CIPRO           Phenazopyridine HCl 200 MG Tabs   Take 200 mg by mouth 3 (three) times daily as needed for Lifestyle Modification Recommendations:    Modification Recommendation   Weight Reduction Maintain normal body weight (body mass index 18.5-24.9 kg/m2)   DASH eating plan Adopt a diet rich in fruits, vegetables, and low fat dairy products with reduced cont